# Patient Record
Sex: MALE | Race: BLACK OR AFRICAN AMERICAN | NOT HISPANIC OR LATINO
[De-identification: names, ages, dates, MRNs, and addresses within clinical notes are randomized per-mention and may not be internally consistent; named-entity substitution may affect disease eponyms.]

---

## 2017-02-07 ENCOUNTER — APPOINTMENT (OUTPATIENT)
Dept: ELECTROPHYSIOLOGY | Facility: CLINIC | Age: 82
End: 2017-02-07

## 2017-05-16 ENCOUNTER — APPOINTMENT (OUTPATIENT)
Dept: ELECTROPHYSIOLOGY | Facility: CLINIC | Age: 82
End: 2017-05-16

## 2017-06-27 ENCOUNTER — APPOINTMENT (OUTPATIENT)
Dept: ELECTROPHYSIOLOGY | Facility: CLINIC | Age: 82
End: 2017-06-27

## 2017-07-18 ENCOUNTER — APPOINTMENT (OUTPATIENT)
Dept: ELECTROPHYSIOLOGY | Facility: CLINIC | Age: 82
End: 2017-07-18

## 2017-07-18 VITALS
DIASTOLIC BLOOD PRESSURE: 99 MMHG | BODY MASS INDEX: 32.27 KG/M2 | HEART RATE: 55 BPM | OXYGEN SATURATION: 99 % | WEIGHT: 208 LBS | SYSTOLIC BLOOD PRESSURE: 225 MMHG | HEIGHT: 67.5 IN

## 2017-07-18 VITALS — DIASTOLIC BLOOD PRESSURE: 76 MMHG | SYSTOLIC BLOOD PRESSURE: 165 MMHG

## 2017-07-18 VITALS — SYSTOLIC BLOOD PRESSURE: 174 MMHG | DIASTOLIC BLOOD PRESSURE: 93 MMHG | HEART RATE: 60 BPM

## 2017-07-18 DIAGNOSIS — Z95.0 PRESENCE OF CARDIAC PACEMAKER: ICD-10-CM

## 2017-07-26 ENCOUNTER — INPATIENT (INPATIENT)
Facility: HOSPITAL | Age: 82
LOS: 2 days | Discharge: HOME | End: 2017-07-29
Attending: HOSPITALIST | Admitting: HOSPITALIST
Payer: MEDICARE

## 2017-07-26 VITALS
RESPIRATION RATE: 16 BRPM | SYSTOLIC BLOOD PRESSURE: 154 MMHG | OXYGEN SATURATION: 100 % | DIASTOLIC BLOOD PRESSURE: 67 MMHG | TEMPERATURE: 98 F | HEART RATE: 56 BPM

## 2017-07-26 DIAGNOSIS — C61 MALIGNANT NEOPLASM OF PROSTATE: ICD-10-CM

## 2017-07-26 DIAGNOSIS — R55 SYNCOPE AND COLLAPSE: ICD-10-CM

## 2017-07-26 DIAGNOSIS — Z29.9 ENCOUNTER FOR PROPHYLACTIC MEASURES, UNSPECIFIED: ICD-10-CM

## 2017-07-26 DIAGNOSIS — I10 ESSENTIAL (PRIMARY) HYPERTENSION: ICD-10-CM

## 2017-07-26 DIAGNOSIS — Z95.0 PRESENCE OF CARDIAC PACEMAKER: ICD-10-CM

## 2017-07-26 LAB
ALBUMIN SERPL ELPH-MCNC: 4.5 G/DL — SIGNIFICANT CHANGE UP (ref 3.3–5)
ALP SERPL-CCNC: 68 U/L — SIGNIFICANT CHANGE UP (ref 40–120)
ALT FLD-CCNC: 13 U/L — SIGNIFICANT CHANGE UP (ref 4–41)
ANISOCYTOSIS BLD QL: SLIGHT — SIGNIFICANT CHANGE UP
APPEARANCE UR: CLEAR — SIGNIFICANT CHANGE UP
APTT BLD: 30.3 SEC — SIGNIFICANT CHANGE UP (ref 27.5–37.4)
AST SERPL-CCNC: 21 U/L — SIGNIFICANT CHANGE UP (ref 4–40)
BASOPHILS # BLD AUTO: 0.01 K/UL — SIGNIFICANT CHANGE UP (ref 0–0.2)
BASOPHILS NFR BLD AUTO: 0.3 % — SIGNIFICANT CHANGE UP (ref 0–2)
BASOPHILS NFR SPEC: 0 % — SIGNIFICANT CHANGE UP (ref 0–2)
BILIRUB SERPL-MCNC: 0.4 MG/DL — SIGNIFICANT CHANGE UP (ref 0.2–1.2)
BILIRUB UR-MCNC: NEGATIVE — SIGNIFICANT CHANGE UP
BLOOD UR QL VISUAL: NEGATIVE — SIGNIFICANT CHANGE UP
BUN SERPL-MCNC: 28 MG/DL — HIGH (ref 7–23)
CALCIUM SERPL-MCNC: 10.4 MG/DL — SIGNIFICANT CHANGE UP (ref 8.4–10.5)
CHLORIDE SERPL-SCNC: 96 MMOL/L — LOW (ref 98–107)
CHLORIDE UR-SCNC: 30 MMOL/L — SIGNIFICANT CHANGE UP
CK MB BLD-MCNC: 1.3 NG/ML — SIGNIFICANT CHANGE UP (ref 1–6.6)
CK MB BLD-MCNC: SIGNIFICANT CHANGE UP (ref 0–2.5)
CK SERPL-CCNC: 107 U/L — SIGNIFICANT CHANGE UP (ref 30–200)
CK SERPL-CCNC: 96 U/L — SIGNIFICANT CHANGE UP (ref 30–200)
CO2 SERPL-SCNC: 30 MMOL/L — SIGNIFICANT CHANGE UP (ref 22–31)
COLOR SPEC: SIGNIFICANT CHANGE UP
CREAT ?TM UR-MCNC: 18.19 MG/DL — SIGNIFICANT CHANGE UP
CREAT SERPL-MCNC: 1.97 MG/DL — HIGH (ref 0.5–1.3)
EOSINOPHIL # BLD AUTO: 0.05 K/UL — SIGNIFICANT CHANGE UP (ref 0–0.5)
EOSINOPHIL NFR BLD AUTO: 1.5 % — SIGNIFICANT CHANGE UP (ref 0–6)
EOSINOPHIL NFR FLD: 1.7 % — SIGNIFICANT CHANGE UP (ref 0–6)
GIANT PLATELETS BLD QL SMEAR: PRESENT — SIGNIFICANT CHANGE UP
GLUCOSE SERPL-MCNC: 103 MG/DL — HIGH (ref 70–99)
GLUCOSE UR-MCNC: 50 — SIGNIFICANT CHANGE UP
HCT VFR BLD CALC: 39.8 % — SIGNIFICANT CHANGE UP (ref 39–50)
HGB BLD-MCNC: 12.7 G/DL — LOW (ref 13–17)
IMM GRANULOCYTES # BLD AUTO: 0 # — SIGNIFICANT CHANGE UP
IMM GRANULOCYTES NFR BLD AUTO: 0 % — SIGNIFICANT CHANGE UP (ref 0–1.5)
INR BLD: 0.98 — SIGNIFICANT CHANGE UP (ref 0.88–1.17)
KETONES UR-MCNC: NEGATIVE — SIGNIFICANT CHANGE UP
LEUKOCYTE ESTERASE UR-ACNC: NEGATIVE — SIGNIFICANT CHANGE UP
LYMPHOCYTES # BLD AUTO: 1.08 K/UL — SIGNIFICANT CHANGE UP (ref 1–3.3)
LYMPHOCYTES # BLD AUTO: 32.6 % — SIGNIFICANT CHANGE UP (ref 13–44)
LYMPHOCYTES NFR SPEC AUTO: 29.8 % — SIGNIFICANT CHANGE UP (ref 13–44)
MACROCYTES BLD QL: SLIGHT — SIGNIFICANT CHANGE UP
MCHC RBC-ENTMCNC: 30.8 PG — SIGNIFICANT CHANGE UP (ref 27–34)
MCHC RBC-ENTMCNC: 31.9 % — LOW (ref 32–36)
MCV RBC AUTO: 96.6 FL — SIGNIFICANT CHANGE UP (ref 80–100)
MONOCYTES # BLD AUTO: 0.33 K/UL — SIGNIFICANT CHANGE UP (ref 0–0.9)
MONOCYTES NFR BLD AUTO: 10 % — SIGNIFICANT CHANGE UP (ref 2–14)
MONOCYTES NFR BLD: 5.3 % — SIGNIFICANT CHANGE UP (ref 2–9)
NEUTROPHIL AB SER-ACNC: 61.4 % — SIGNIFICANT CHANGE UP (ref 43–77)
NEUTROPHILS # BLD AUTO: 1.84 K/UL — SIGNIFICANT CHANGE UP (ref 1.8–7.4)
NEUTROPHILS NFR BLD AUTO: 55.6 % — SIGNIFICANT CHANGE UP (ref 43–77)
NITRITE UR-MCNC: NEGATIVE — SIGNIFICANT CHANGE UP
NRBC # FLD: 0 — SIGNIFICANT CHANGE UP
PH UR: 7 — SIGNIFICANT CHANGE UP (ref 4.6–8)
PLATELET # BLD AUTO: 147 K/UL — LOW (ref 150–400)
PLATELET COUNT - ESTIMATE: SIGNIFICANT CHANGE UP
PMV BLD: 10.5 FL — SIGNIFICANT CHANGE UP (ref 7–13)
POTASSIUM SERPL-MCNC: 3.3 MMOL/L — LOW (ref 3.5–5.3)
POTASSIUM SERPL-SCNC: 3.3 MMOL/L — LOW (ref 3.5–5.3)
POTASSIUM UR-SCNC: 8.6 MEQ/L — SIGNIFICANT CHANGE UP
PROT SERPL-MCNC: 8 G/DL — SIGNIFICANT CHANGE UP (ref 6–8.3)
PROT UR-MCNC: 10 — SIGNIFICANT CHANGE UP
PROTHROM AB SERPL-ACNC: 11 SEC — SIGNIFICANT CHANGE UP (ref 9.8–13.1)
RBC # BLD: 4.12 M/UL — LOW (ref 4.2–5.8)
RBC # FLD: 13.3 % — SIGNIFICANT CHANGE UP (ref 10.3–14.5)
RBC CASTS # UR COMP ASSIST: SIGNIFICANT CHANGE UP (ref 0–?)
REVIEW TO FOLLOW: YES — SIGNIFICANT CHANGE UP
SODIUM SERPL-SCNC: 138 MMOL/L — SIGNIFICANT CHANGE UP (ref 135–145)
SODIUM UR-SCNC: 39 MEQ/L — SIGNIFICANT CHANGE UP
SP GR SPEC: 1 — SIGNIFICANT CHANGE UP (ref 1–1.03)
SQUAMOUS # UR AUTO: SIGNIFICANT CHANGE UP
TROPONIN T SERPL-MCNC: < 0.06 NG/ML — SIGNIFICANT CHANGE UP (ref 0–0.06)
TROPONIN T SERPL-MCNC: < 0.06 NG/ML — SIGNIFICANT CHANGE UP (ref 0–0.06)
UROBILINOGEN FLD QL: NORMAL E.U. — SIGNIFICANT CHANGE UP (ref 0.1–0.2)
VARIANT LYMPHS # BLD: 1.8 % — SIGNIFICANT CHANGE UP
WBC # BLD: 3.31 K/UL — LOW (ref 3.8–10.5)
WBC # FLD AUTO: 3.31 K/UL — LOW (ref 3.8–10.5)

## 2017-07-26 PROCEDURE — 99223 1ST HOSP IP/OBS HIGH 75: CPT

## 2017-07-26 PROCEDURE — 71010: CPT | Mod: 26

## 2017-07-26 RX ORDER — MIDODRINE HYDROCHLORIDE 2.5 MG/1
10 TABLET ORAL THREE TIMES A DAY
Qty: 0 | Refills: 0 | Status: DISCONTINUED | OUTPATIENT
Start: 2017-07-26 | End: 2017-07-26

## 2017-07-26 RX ORDER — PROPRANOLOL HCL 160 MG
10 CAPSULE, EXTENDED RELEASE 24HR ORAL
Qty: 0 | Refills: 0 | Status: DISCONTINUED | OUTPATIENT
Start: 2017-07-26 | End: 2017-07-27

## 2017-07-26 RX ORDER — SODIUM CHLORIDE 9 MG/ML
3 INJECTION INTRAMUSCULAR; INTRAVENOUS; SUBCUTANEOUS EVERY 8 HOURS
Qty: 0 | Refills: 0 | Status: DISCONTINUED | OUTPATIENT
Start: 2017-07-26 | End: 2017-07-29

## 2017-07-26 RX ORDER — SIMVASTATIN 20 MG/1
40 TABLET, FILM COATED ORAL AT BEDTIME
Qty: 0 | Refills: 0 | Status: DISCONTINUED | OUTPATIENT
Start: 2017-07-26 | End: 2017-07-26

## 2017-07-26 RX ORDER — ATORVASTATIN CALCIUM 80 MG/1
10 TABLET, FILM COATED ORAL AT BEDTIME
Qty: 0 | Refills: 0 | Status: DISCONTINUED | OUTPATIENT
Start: 2017-07-26 | End: 2017-07-29

## 2017-07-26 RX ORDER — HEPARIN SODIUM 5000 [USP'U]/ML
5000 INJECTION INTRAVENOUS; SUBCUTANEOUS EVERY 12 HOURS
Qty: 0 | Refills: 0 | Status: DISCONTINUED | OUTPATIENT
Start: 2017-07-26 | End: 2017-07-27

## 2017-07-26 RX ORDER — ENZALUTAMIDE 80 MG/1
160 TABLET ORAL DAILY
Qty: 0 | Refills: 0 | Status: DISCONTINUED | OUTPATIENT
Start: 2017-07-26 | End: 2017-07-27

## 2017-07-26 RX ORDER — MIDODRINE HYDROCHLORIDE 2.5 MG/1
10 TABLET ORAL THREE TIMES A DAY
Qty: 0 | Refills: 0 | Status: DISCONTINUED | OUTPATIENT
Start: 2017-07-26 | End: 2017-07-29

## 2017-07-26 RX ORDER — SODIUM CHLORIDE 9 MG/ML
500 INJECTION INTRAMUSCULAR; INTRAVENOUS; SUBCUTANEOUS ONCE
Qty: 0 | Refills: 0 | Status: COMPLETED | OUTPATIENT
Start: 2017-07-26 | End: 2017-07-26

## 2017-07-26 RX ORDER — POTASSIUM CHLORIDE 20 MEQ
40 PACKET (EA) ORAL ONCE
Qty: 0 | Refills: 0 | Status: COMPLETED | OUTPATIENT
Start: 2017-07-26 | End: 2017-07-26

## 2017-07-26 RX ORDER — FLUDROCORTISONE ACETATE 0.1 MG/1
0.1 TABLET ORAL DAILY
Qty: 0 | Refills: 0 | Status: DISCONTINUED | OUTPATIENT
Start: 2017-07-26 | End: 2017-07-29

## 2017-07-26 RX ORDER — ASPIRIN/CALCIUM CARB/MAGNESIUM 324 MG
81 TABLET ORAL DAILY
Qty: 0 | Refills: 0 | Status: DISCONTINUED | OUTPATIENT
Start: 2017-07-26 | End: 2017-07-29

## 2017-07-26 RX ADMIN — SODIUM CHLORIDE 3 MILLILITER(S): 9 INJECTION INTRAMUSCULAR; INTRAVENOUS; SUBCUTANEOUS at 22:00

## 2017-07-26 RX ADMIN — Medication 40 MILLIEQUIVALENT(S): at 18:32

## 2017-07-26 RX ADMIN — ATORVASTATIN CALCIUM 10 MILLIGRAM(S): 80 TABLET, FILM COATED ORAL at 22:56

## 2017-07-26 RX ADMIN — MIDODRINE HYDROCHLORIDE 10 MILLIGRAM(S): 2.5 TABLET ORAL at 22:56

## 2017-07-26 RX ADMIN — SODIUM CHLORIDE 500 MILLILITER(S): 9 INJECTION INTRAMUSCULAR; INTRAVENOUS; SUBCUTANEOUS at 16:26

## 2017-07-26 NOTE — ED ADULT NURSE NOTE - OBJECTIVE STATEMENT
Patient received to room 3, Aox4 and ambulatory. C/o witnessed syncopal episode this AM while sitting in chair, denies falling or hitting head. Does not remember the event. Patient denies c/o at this time. VSS. IN no acute distress. Breathing even and nonlabored. Will continue to monitor.

## 2017-07-26 NOTE — H&P ADULT - ASSESSMENT
84M admitted for syncopal episode. Seen by EP, orthostatic hypotension seems the most likely etiology of his syncopal event today.  Patient is not pacer dependent with underlying rhythm SB in 50's bpm. 84M admitted for syncopal episode. Seen by EP, orthostatic hypotension likely autonomic dysfunction seems the most likely etiology of his syncopal event today.  Patient is not pacer dependent with underlying rhythm SB in 50's bpm.

## 2017-07-26 NOTE — ED PROVIDER NOTE - MEDICAL DECISION MAKING DETAILS
84M p/w syncope. Concern for ACS, arrhythmia, PPM malfunction. Also possibly orthostatic hypotension. Plan: ekg, cxr, labs, troponin, admit tele, EP c/s for PPM interrogation.

## 2017-07-26 NOTE — H&P ADULT - HISTORY OF PRESENT ILLNESS
84M that ambulates with a cane, HTN, Hyperlipidemia, Sinus Node dysfunction s/p Guidant PPM in 2005, vasovagal syncope (total 5 X), orthostatic hypotension-maintained on Florinef/midodrine, prostate Cancer s/p surgery presented to ED s/p atraumatic syncopal episode today, 7/26.  Patient described "I got up from a chair walked about 10 feet and sat down on another chair, next thing my family was shaking me".  He denies prodrome/palpitations.  He reports he has tremors for one month.  His pacemaker was interrogated today.  No events recorded or correlated to syncopal event.  He was evaluated in device clinic last week on 7/18/17 and was found to Elective Replacement Indicator (LINDA) status of his pacemaker.  He was to be scheduled for generator change. He admits exertional dyspnea and occasional upper abdominal discomfort but denies palpitations, chest pain, bowel / bladder incontinence SOB, blurry vision ,dizziness, tinnitus nausea, vomit, diaphoresis chills,. 83 yo man who ambulates with a cane [facility] HTN, Hyperlipidemia, Sinus Node dysfunction s/p Guidant PPM in 2005,  hx of syncope (total 5 X), orthostatic hypotension-maintained on Florinef/midodrine, prostate Cancer s/p surgery presented to ED after a syncopal episode today, 7/26.  Patient reports "I got up from a chair walked about 10 feet and sat down on another chair and took my pills, next thing my family was shaking me".  He denies prodrome, chest pain, palpitations, shortness of breath, tinnitus, vertigo, lightheadedness or dizziness.  His wife at bedside reports her daughter told her that passed out for about 5 minutes. He woke up in the chair without any confusion and was back to baseline. No head strike. Pt did not have any convulsions or incontinence during the episode.  Pt denied fevers, chills, cough, any abdominal pain, nausea, vomiting change in bladder or bowel habits. No sick contacts. No new medication changes. As per wife, pt has good appetite and has been eating and drinking as per usual.  Reports this is similar to prior syncopal episodes. As per wife at bedside, whenever he stands too quickly and walks his blood pressure drops and he passes out. He has tried to stand slowly to prevent this but sometimes forgets. He endorses tremors for one month, exertional dyspnea at times and occasional upper abdominal discomfort    In the ED, pt was afebrile, hypertensive, other vitals WNL. His pacemaker was interrogated today.  No events recorded or correlated to syncopal event.  He was evaluated in device clinic last week on 7/18/17 and was found to Elective Replacement Indicator (LINDA) status of his pacemaker.  He was to be scheduled for generator change.

## 2017-07-26 NOTE — H&P ADULT - NSHPLABSRESULTS_GEN_ALL_CORE
CXR preliminay= Slight right hemidiaphragm elevation. Clear lungs. No pleural effusions or pneumothorax. Stable right chest wall dual-lead pacemaker and cardiac and mediastinal  silhouettes including aortic arch calcifications. Tortuous descending thoracic aorta contour.  EKG A Paced @ 55b/ min, LVH  K+ 3.3  BUN/ Creatinine= 28/ 1.97  Glucose = 103  CARDIAC MARKERS ( 26 Jul 2017 13:50 )  x     / < 0.06 ng/mL / 107 u/L / 1.30 ng/mL / x

## 2017-07-26 NOTE — H&P ADULT - PROBLEM SELECTOR PLAN 1
CM  F/U CE, EKG TTE  Fall and Aspiration precautions  NPO after MN for likely PPM generator change  Give O2, ASA, BB, Statin   F/U Pt consult CM  F/U CE, EKG, TTE  Fall and Aspiration precautions  NPO after MN for likely PPM generator change  C/w  ASA, BB, Statin   F/U PT consult F/U troponins, serial EKG,   Check TTE  Telemetry monitoring  Carotid dopplers  Check TSH  Hgba1c  Fall and Aspiration precautions  NPO after MN for likely PPM generator change  C/w  ASA, BB, Statin   F/U PT consult

## 2017-07-26 NOTE — CONSULT NOTE ADULT - SUBJECTIVE AND OBJECTIVE BOX
HPI:   84 year old male with PMH of HTN, HLD, SND s/p PPM in 2005, vasovagal syncope (total 5 X), orthostatic hypotension-maintained on Florinef/midodrine, prostate Cancer s/p surgery presented to ED s/p syncopal episode today.  Patient described "I got up from a chair walked about 10 feet and sat down on another chair, next thing my family was shaking me".  He denies prodrome/palpitations.  He reports he has tremors for one month.  His pacemaker was interrogated today.  No events recorded or correlated to syncopal event.  He was evaluated in device clinic last week on 7/18/17 and was found to Elective Replacement Indicator (LINDA) status of his pacemaker.  He was to be scheduled for generator change. He admits exertional dyspnea and occasional upper abdominal discomfort but denies palpitations or chest pain.       PAST MEDICAL & SURGICAL HISTORY:  Pacemaker: Guidant. placed 1995. Dr Jovon SUTTON  HTN (hypertension)  Prostate ca  Pacemaker      MEDICATIONS  (STANDING): ASA 81 mg daily; Florinef 0.1 mg daily; Omerprazole 20 mg daily; Midodrine 5 mg TID  sodium chloride 0.9% Bolus 500 milliLiter(s) IV Bolus once    MEDICATIONS  (PRN):    Allergies    No Known Allergies    FAMILY HISTORY: non-contributory    SOCIAL HISTORY:  ex-smoker;  no  ALCOHOL or Drug     REVIEW OF SYSTEMS:    CONSTITUTIONAL: No fever, weight loss, chills, shakes, or fatigue  EYES: No eye pain, visual disturbances, or discharge  ENMT:  No difficulty hearing, tinnitus, vertigo; No sinus or throat pain  NECK: No pain or stiffness  RESPIRATORY: No cough, wheezing, hemoptysis, or shortness of breath  CARDIOVASCULAR: + exertional dyspnea, no palpitations, dizziness, paroxysmal nocturnal dyspnea, orthopnea, or arm or leg swelling  GASTROINTESTINAL: + abdominal  pain, No nausea, vomiting, hematemesis, diarrhea, constipation, melena or bright red blood.  GENITOURINARY: No dysuria, nocturia, hematuria, or urinary incontinence  NEUROLOGICAL: + tremors; No headaches, memory loss, slurred speech, limb weakness, loss of strength, numbness,    SKIN: No itching, burning, rashes, or lesions   LYMPH NODES: No enlarged glands  ENDOCRINE: No heat or cold intolerance, or hair loss  MUSCULOSKELETAL: +back pain, No joint pain or swelling, muscle, or extremity pain  PSYCHIATRIC: No depression, anxiety, or difficulty sleeping  HEME/LYMPH: No easy bruising or bleeding gums  ALLERY AND IMMUNOLOGIC: No hives or rash.      Vital Signs Last 24 Hrs  T(C): 36.6 (26 Jul 2017 13:44), Max: 36.7 (26 Jul 2017 12:31)  T(F): 97.8 (26 Jul 2017 13:44), Max: 98 (26 Jul 2017 12:31)  HR: 56 (26 Jul 2017 12:31) (56 - 56)  BP: 154/67 (26 Jul 2017 12:31) (154/67 - 154/67)  BP(mean): --  RR: 16 (26 Jul 2017 12:31) (16 - 16)  SpO2: 100% (26 Jul 2017 12:31) (100% - 100%)    PHYSICAL EXAM:    GENERAL: In no apparent distress, well nourished, and hydrated.  HEAD:  Atraumatic, Normocephalic  NECK: Supple . No JVD or carotid bruit or thyroidmegaly.  Carotid pulse is 2+ bilaterally.  PULMONARY: Clear to auscultation and perfusion.  No rales, wheezing, or rhonchi bilaterally.  HEART: Regular rate and rhythm; No murmurs, rubs, or gallops.  ABDOMEN: Soft, Nontender, Nondistended; Bowel sounds present  EXTREMITIES: No clubbing, cyanosis, or edema  NEUROLOGICAL: Alert oriented x3; noted hand tremor; no neurological deficit  Skin: dry intact, no rashes or lesion          INTERPRETATION OF TELEMETRY: SB in 50's bpm.      ECG: SB. no ST-T abnormality    LABS:                        12.7   3.31  )-----------( 147      ( 26 Jul 2017 13:50 )             39.8     07-26    138  |  96<L>  |  28<H>  ----------------------------<  103<H>  3.3<L>   |  30  |  1.97<H>    Ca    10.4      26 Jul 2017 13:50    TPro  8.0  /  Alb  4.5  /  TBili  0.4  /  DBili  x   /  AST  21  /  ALT  13  /  AlkPhos  68  07-26    CARDIAC MARKERS ( 26 Jul 2017 13:50 )  x     / < 0.06 ng/mL / 107 u/L / 1.30 ng/mL / x          PT/INR - ( 26 Jul 2017 13:50 )   PT: 11.0 SEC;   INR: 0.98          PTT - ( 26 Jul 2017 13:50 )  PTT:30.3 SEC    BNP  RADIOLOGY & ADDITIONAL STUDIES:  PREVIOUS DIAGNOSTIC TESTING:      ECHO FINDINGS:    STRESS FINDINGS:    CATHETERIZATION FINDINGS:

## 2017-07-26 NOTE — ED PROVIDER NOTE - ATTENDING CONTRIBUTION TO CARE
Attending note:   After face to face evaluation of this patient, I concur with above noted hx, pe, and care plan for this patient. +syncope, recurrent in this patient with PPM.   Patient sitting and passed out without effort to stand.   VSS,  evaluation inprogress

## 2017-07-26 NOTE — H&P ADULT - ATTENDING COMMENTS
Pt seen and examined 7/26/17. Spoke w/ pt and wife at bedside. History edited as above. Labs and imaging reviewed. Based on hx likely orthostatic hypotension from ?autonomic dysfunction. Unlikely cardiac, unlikely seizure, no sign of infection.  Unclear why pt appears to have autonomic dysfunction. No hx of DM. ? early Parkinson's given tremors or other neurologic disease. EP interrogation noted, no cardiac events. Plan for generator change. Check TTE. Propanolol possibly exacerbating but pt w/ hx of HTN. C/w florinef, midodrine, behavior modification such as standing slowly, avoiding standing directly after meals. Can consider trial of reglan/ caffeine. Consider neuro consult for w/u of autonomic dysfunction. Pt seen and examined 7/26/17. Spoke w/ pt and wife at bedside. History edited as above. Labs and imaging reviewed. Based on hx likely orthostatic hypotension from ?autonomic dysfunction. Unlikely cardiac, unlikely seizure, no sign of infection.  Unclear why pt appears to have autonomic dysfunction. No hx of DM. ? early Parkinson's given tremors or other neurologic disease. EP interrogation noted, no cardiac events. Plan for generator change. Check TTE. Carotid dopplers ordered. Propanolol possibly exacerbating but pt w/ hx of HTN. C/w florinef, midodrine, behavior modification such as standing slowly, avoiding standing directly after meals. Can consider trial of reglan/ caffeine. Consider neuro consult for w/u of autonomic dysfunction.

## 2017-07-26 NOTE — ED PROVIDER NOTE - OBJECTIVE STATEMENT
84M h/o HTN, PPM, prostate ca p/w syncopal episode this morning. Pt felt dizzy when standing up from a chair, had LOC. No fall or injury. Not on blood thinners. No prodrome of chest pain, SOB, or palpitations. Pt unsure which medications he takes. No HA, fevers, cough, N/V, diarrhea, or melena. No hx of DVT/PE, calf pain/swelling, hemoptysis, or recent trauma/surgery/immobilization.   EP: Dr Sigala

## 2017-07-26 NOTE — H&P ADULT - PROBLEM SELECTOR PLAN 5
Heparin 5000 U sub cut BID baseline Cr around 1.4. possibly pre-renal  s/p IVF in ED  Trend Cr. baseline Cr around 1.4/ 1.5 possibly pre-renal  s/p IVF in ED  Pt reports good urine ouput  Trend Cr  send urine lytes

## 2017-07-26 NOTE — ED PROVIDER NOTE - PROGRESS NOTE DETAILS
Radha: Spoke with Dr Sigala, who was in the ED. Dr Sigala will arrange for EP to interrogate pt's PPM when admitted.

## 2017-07-27 DIAGNOSIS — N17.9 ACUTE KIDNEY FAILURE, UNSPECIFIED: ICD-10-CM

## 2017-07-27 LAB
ALBUMIN SERPL ELPH-MCNC: 3.8 G/DL — SIGNIFICANT CHANGE UP (ref 3.3–5)
ALP SERPL-CCNC: 59 U/L — SIGNIFICANT CHANGE UP (ref 40–120)
ALT FLD-CCNC: 14 U/L — SIGNIFICANT CHANGE UP (ref 4–41)
AST SERPL-CCNC: 23 U/L — SIGNIFICANT CHANGE UP (ref 4–40)
BASOPHILS # BLD AUTO: 0.01 K/UL — SIGNIFICANT CHANGE UP (ref 0–0.2)
BASOPHILS NFR BLD AUTO: 0.3 % — SIGNIFICANT CHANGE UP (ref 0–2)
BILIRUB SERPL-MCNC: 0.5 MG/DL — SIGNIFICANT CHANGE UP (ref 0.2–1.2)
BUN SERPL-MCNC: 26 MG/DL — HIGH (ref 7–23)
BUN SERPL-MCNC: 26 MG/DL — HIGH (ref 7–23)
CALCIUM SERPL-MCNC: 10.1 MG/DL — SIGNIFICANT CHANGE UP (ref 8.4–10.5)
CALCIUM SERPL-MCNC: 10.1 MG/DL — SIGNIFICANT CHANGE UP (ref 8.4–10.5)
CHLORIDE SERPL-SCNC: 100 MMOL/L — SIGNIFICANT CHANGE UP (ref 98–107)
CHLORIDE SERPL-SCNC: 100 MMOL/L — SIGNIFICANT CHANGE UP (ref 98–107)
CHOLEST SERPL-MCNC: 216 MG/DL — HIGH (ref 120–199)
CO2 SERPL-SCNC: 25 MMOL/L — SIGNIFICANT CHANGE UP (ref 22–31)
CO2 SERPL-SCNC: 25 MMOL/L — SIGNIFICANT CHANGE UP (ref 22–31)
CREAT SERPL-MCNC: 1.53 MG/DL — HIGH (ref 0.5–1.3)
CREAT SERPL-MCNC: 1.53 MG/DL — HIGH (ref 0.5–1.3)
EOSINOPHIL # BLD AUTO: 0.1 K/UL — SIGNIFICANT CHANGE UP (ref 0–0.5)
EOSINOPHIL NFR BLD AUTO: 3.2 % — SIGNIFICANT CHANGE UP (ref 0–6)
GLUCOSE SERPL-MCNC: 100 MG/DL — HIGH (ref 70–99)
GLUCOSE SERPL-MCNC: 100 MG/DL — HIGH (ref 70–99)
HBA1C BLD-MCNC: 6.1 % — HIGH (ref 4–5.6)
HCT VFR BLD CALC: 35.1 % — LOW (ref 39–50)
HCT VFR BLD CALC: 35.1 % — LOW (ref 39–50)
HDLC SERPL-MCNC: 83 MG/DL — HIGH (ref 35–55)
HGB BLD-MCNC: 11.5 G/DL — LOW (ref 13–17)
HGB BLD-MCNC: 11.5 G/DL — LOW (ref 13–17)
IMM GRANULOCYTES # BLD AUTO: 0.01 # — SIGNIFICANT CHANGE UP
IMM GRANULOCYTES NFR BLD AUTO: 0.3 % — SIGNIFICANT CHANGE UP (ref 0–1.5)
LIPID PNL WITH DIRECT LDL SERPL: 129 MG/DL — SIGNIFICANT CHANGE UP
LYMPHOCYTES # BLD AUTO: 1.42 K/UL — SIGNIFICANT CHANGE UP (ref 1–3.3)
LYMPHOCYTES # BLD AUTO: 45.5 % — HIGH (ref 13–44)
MCHC RBC-ENTMCNC: 30.7 PG — SIGNIFICANT CHANGE UP (ref 27–34)
MCHC RBC-ENTMCNC: 30.7 PG — SIGNIFICANT CHANGE UP (ref 27–34)
MCHC RBC-ENTMCNC: 32.8 % — SIGNIFICANT CHANGE UP (ref 32–36)
MCHC RBC-ENTMCNC: 32.8 % — SIGNIFICANT CHANGE UP (ref 32–36)
MCV RBC AUTO: 93.9 FL — SIGNIFICANT CHANGE UP (ref 80–100)
MCV RBC AUTO: 93.9 FL — SIGNIFICANT CHANGE UP (ref 80–100)
MONOCYTES # BLD AUTO: 0.37 K/UL — SIGNIFICANT CHANGE UP (ref 0–0.9)
MONOCYTES NFR BLD AUTO: 11.9 % — SIGNIFICANT CHANGE UP (ref 2–14)
NEUTROPHILS # BLD AUTO: 1.21 K/UL — LOW (ref 1.8–7.4)
NEUTROPHILS NFR BLD AUTO: 38.8 % — LOW (ref 43–77)
NRBC # FLD: 0 — SIGNIFICANT CHANGE UP
NRBC # FLD: 0 — SIGNIFICANT CHANGE UP
PLATELET # BLD AUTO: 127 K/UL — LOW (ref 150–400)
PLATELET # BLD AUTO: 127 K/UL — LOW (ref 150–400)
PMV BLD: 11.5 FL — SIGNIFICANT CHANGE UP (ref 7–13)
PMV BLD: 11.5 FL — SIGNIFICANT CHANGE UP (ref 7–13)
POTASSIUM SERPL-MCNC: 3.8 MMOL/L — SIGNIFICANT CHANGE UP (ref 3.5–5.3)
POTASSIUM SERPL-MCNC: 3.8 MMOL/L — SIGNIFICANT CHANGE UP (ref 3.5–5.3)
POTASSIUM SERPL-SCNC: 3.8 MMOL/L — SIGNIFICANT CHANGE UP (ref 3.5–5.3)
POTASSIUM SERPL-SCNC: 3.8 MMOL/L — SIGNIFICANT CHANGE UP (ref 3.5–5.3)
PROT SERPL-MCNC: 6.9 G/DL — SIGNIFICANT CHANGE UP (ref 6–8.3)
RBC # BLD: 3.74 M/UL — LOW (ref 4.2–5.8)
RBC # BLD: 3.74 M/UL — LOW (ref 4.2–5.8)
RBC # FLD: 13.4 % — SIGNIFICANT CHANGE UP (ref 10.3–14.5)
RBC # FLD: 13.4 % — SIGNIFICANT CHANGE UP (ref 10.3–14.5)
SODIUM SERPL-SCNC: 141 MMOL/L — SIGNIFICANT CHANGE UP (ref 135–145)
SODIUM SERPL-SCNC: 141 MMOL/L — SIGNIFICANT CHANGE UP (ref 135–145)
TRIGL SERPL-MCNC: 84 MG/DL — SIGNIFICANT CHANGE UP (ref 10–149)
TSH SERPL-MCNC: 1.72 UIU/ML — SIGNIFICANT CHANGE UP (ref 0.27–4.2)
WBC # BLD: 3.12 K/UL — LOW (ref 3.8–10.5)
WBC # BLD: 3.12 K/UL — LOW (ref 3.8–10.5)
WBC # FLD AUTO: 3.12 K/UL — LOW (ref 3.8–10.5)
WBC # FLD AUTO: 3.12 K/UL — LOW (ref 3.8–10.5)

## 2017-07-27 PROCEDURE — 93880 EXTRACRANIAL BILAT STUDY: CPT | Mod: 26

## 2017-07-27 PROCEDURE — 93280 PM DEVICE PROGR EVAL DUAL: CPT | Mod: 26

## 2017-07-27 PROCEDURE — 99233 SBSQ HOSP IP/OBS HIGH 50: CPT

## 2017-07-27 PROCEDURE — 93306 TTE W/DOPPLER COMPLETE: CPT | Mod: 26

## 2017-07-27 RX ORDER — PROPRANOLOL HCL 160 MG
10 CAPSULE, EXTENDED RELEASE 24HR ORAL
Qty: 0 | Refills: 0 | Status: DISCONTINUED | OUTPATIENT
Start: 2017-07-27 | End: 2017-07-29

## 2017-07-27 RX ORDER — ENZALUTAMIDE 80 MG/1
160 TABLET ORAL DAILY
Qty: 0 | Refills: 0 | Status: DISCONTINUED | OUTPATIENT
Start: 2017-07-27 | End: 2017-07-29

## 2017-07-27 RX ADMIN — FLUDROCORTISONE ACETATE 0.1 MILLIGRAM(S): 0.1 TABLET ORAL at 06:34

## 2017-07-27 RX ADMIN — Medication 10 MILLIGRAM(S): at 06:35

## 2017-07-27 RX ADMIN — ATORVASTATIN CALCIUM 10 MILLIGRAM(S): 80 TABLET, FILM COATED ORAL at 22:59

## 2017-07-27 RX ADMIN — SODIUM CHLORIDE 3 MILLILITER(S): 9 INJECTION INTRAMUSCULAR; INTRAVENOUS; SUBCUTANEOUS at 14:38

## 2017-07-27 RX ADMIN — Medication 81 MILLIGRAM(S): at 17:19

## 2017-07-27 RX ADMIN — MIDODRINE HYDROCHLORIDE 10 MILLIGRAM(S): 2.5 TABLET ORAL at 06:35

## 2017-07-27 RX ADMIN — MIDODRINE HYDROCHLORIDE 10 MILLIGRAM(S): 2.5 TABLET ORAL at 22:59

## 2017-07-27 RX ADMIN — SODIUM CHLORIDE 3 MILLILITER(S): 9 INJECTION INTRAMUSCULAR; INTRAVENOUS; SUBCUTANEOUS at 22:59

## 2017-07-27 RX ADMIN — SODIUM CHLORIDE 3 MILLILITER(S): 9 INJECTION INTRAMUSCULAR; INTRAVENOUS; SUBCUTANEOUS at 06:35

## 2017-07-27 RX ADMIN — HEPARIN SODIUM 5000 UNIT(S): 5000 INJECTION INTRAVENOUS; SUBCUTANEOUS at 06:34

## 2017-07-27 NOTE — PROGRESS NOTE ADULT - SUBJECTIVE AND OBJECTIVE BOX
Patient is a 84y old  Male who presents with a chief complaint of syncope. Denies chest pain, SOB, palpitations or dizziness      PAST MEDICAL & SURGICAL HISTORY:  Pacemaker: Karina. placed . Dr Jovon SUTTON  HTN (hypertension)  Prostate ca  Pacemaker      MEDICATIONS  (STANDING):  sodium chloride 0.9% lock flush 3 milliLiter(s) IV Push every 8 hours  fludroCORTISONE 0.1 milliGRAM(s) Oral daily  aspirin enteric coated 81 milliGRAM(s) Oral daily  midodrine 10 milliGRAM(s) Oral three times a day  atorvastatin 10 milliGRAM(s) Oral at bedtime  propranolol 10 milliGRAM(s) Oral two times a day  enzalutamide 160 milliGRAM(s) Oral daily    MEDICATIONS  (PRN):            Vital Signs Last 24 Hrs  T(C): 36.6 (2017 14:37), Max: 36.7 (2017 20:00)  T(F): 97.9 (2017 14:37), Max: 98.1 (2017 06:30)  HR: 55 (2017 17:24) (53 - 67)  BP: 153/74 (2017 17:24) (148/62 - 186/72)  BP(mean): --  RR: 18 (2017 14:37) (16 - 18)  SpO2: 100% (2017 14:37) (100% - 100%)            INTERPRETATION OF TELEMETRY:     LABS:                        11.5   3.12  )-----------( 127      ( 2017 07:00 )             35.1         141  |  100  |  26<H>  ----------------------------<  100<H>  3.8   |  25  |  1.53<H>    Ca    10.1      2017 07:00    TPro  6.9  /  Alb  3.8  /  TBili  0.5  /  DBili  x   /  AST  23  /  ALT  14  /  AlkPhos  59  07    CARDIAC MARKERS ( 2017 22:12 )  x     / < 0.06 ng/mL / 96 u/L / x     / x      CARDIAC MARKERS ( 2017 13:50 )  x     / < 0.06 ng/mL / 107 u/L / 1.30 ng/mL / x          PT/INR - ( 2017 13:50 )   PT: 11.0 SEC;   INR: 0.98          PTT - ( 2017 13:50 )  PTT:30.3 SEC  Urinalysis Basic - ( 2017 16:13 )    Color: COLORL / Appearance: CLEAR / S.005 / pH: 7.0  Gluc: 50 / Ketone: NEGATIVE  / Bili: NEGATIVE / Urobili: NORMAL E.U.   Blood: NEGATIVE / Protein: 10 / Nitrite: NEGATIVE   Leuk Esterase: NEGATIVE / RBC: 0-2 / WBC x   Sq Epi: OCC / Non Sq Epi: x / Bacteria: x            PHYSICAL EXAM:    GENERAL: In no apparent distress, well nourished, and hydrated.  HEAD:  Atraumatic, Normocephalic  EYES: EOMI, PERRLA, conjunctiva and sclera clear  ENMT: No tonsillar erythema, exudates, or enlargement; Moist mucous membranes, Good dentition, No lesions  NECK: Supple and normal thyroid.  No JVD or carotid bruit.  Carotid pulse is 2+ bilaterally.  HEART: Regular rate and rhythm; No murmurs, rubs, or gallops.  PULMONARY: Clear to auscultation and perfusion.  No rales, wheezing, or rhonchi bilaterally.  ABDOMEN: Soft, Nontender, Nondistended; Bowel sounds present  EXTREMITIES:  2+ Peripheral Pulses, No clubbing, cyanosis, or edema  LYMPH: No lymphadenopathy noted  NEUROLOGICAL: Grossly nonfocal Patient is a 84y old  Male who presents with a chief complaint of syncope. Denies chest pain, SOB, palpitations or dizziness      PAST MEDICAL & SURGICAL HISTORY:  Pacemaker: Karina. placed . Dr Jovon SUTTON  HTN (hypertension)  Prostate ca  Pacemaker      MEDICATIONS  (STANDING):  sodium chloride 0.9% lock flush 3 milliLiter(s) IV Push every 8 hours  fludroCORTISONE 0.1 milliGRAM(s) Oral daily  aspirin enteric coated 81 milliGRAM(s) Oral daily  midodrine 10 milliGRAM(s) Oral three times a day  atorvastatin 10 milliGRAM(s) Oral at bedtime  propranolol 10 milliGRAM(s) Oral two times a day  enzalutamide 160 milliGRAM(s) Oral daily    MEDICATIONS  (PRN):            Vital Signs Last 24 Hrs  T(C): 36.6 (2017 14:37), Max: 36.7 (2017 20:00)  T(F): 97.9 (2017 14:37), Max: 98.1 (2017 06:30)  HR: 55 (2017 17:24) (53 - 67)  BP: 153/74 (2017 17:24) (148/62 - 186/72)  BP(mean): --  RR: 18 (2017 14:37) (16 - 18)  SpO2: 100% (2017 14:37) (100% - 100%)            INTERPRETATION OF TELEMETRY:     LABS:                        11.5   3.12  )-----------( 127      ( 2017 07:00 )             35.1         141  |  100  |  26<H>  ----------------------------<  100<H>  3.8   |  25  |  1.53<H>    Ca    10.1      2017 07:00    TPro  6.9  /  Alb  3.8  /  TBili  0.5  /  DBili  x   /  AST  23  /  ALT  14  /  AlkPhos  59  07    CARDIAC MARKERS ( 2017 22:12 )  x     / < 0.06 ng/mL / 96 u/L / x     / x      CARDIAC MARKERS ( 2017 13:50 )  x     / < 0.06 ng/mL / 107 u/L / 1.30 ng/mL / x          PT/INR - ( 2017 13:50 )   PT: 11.0 SEC;   INR: 0.98          PTT - ( 2017 13:50 )  PTT:30.3 SEC  Urinalysis Basic - ( 2017 16:13 )    Color: COLORL / Appearance: CLEAR / S.005 / pH: 7.0  Gluc: 50 / Ketone: NEGATIVE  / Bili: NEGATIVE / Urobili: NORMAL E.U.   Blood: NEGATIVE / Protein: 10 / Nitrite: NEGATIVE   Leuk Esterase: NEGATIVE / RBC: 0-2 / WBC x   Sq Epi: OCC / Non Sq Epi: x / Bacteria: x            PHYSICAL EXAM:    GENERAL: In no apparent distress, well nourished, and hydrated.  HEAD:  Atraumatic, Normocephalic  HEART: Regular rate and rhythm; No murmurs, rubs, or gallops.  PULMONARY: Clear to auscultation and perfusion.  No rales, wheezing, or rhonchi bilaterally.  ABDOMEN: Soft, Nontender, Nondistended; Bowel sounds present  EXTREMITIES:  2+ Peripheral Pulses, No clubbing, cyanosis, or edema  LYMPH: No lymphadenopathy noted  NEUROLOGICAL: Grossly nonfocal Patient is a 84y old  Male who presents with a chief complaint of syncope. Denies chest pain, SOB, palpitations or dizziness      PAST MEDICAL & SURGICAL HISTORY:  Pacemaker: Guidant. placed . Dr Jovon SUTTON  HTN (hypertension)  Prostate ca  Pacemaker      MEDICATIONS  (STANDING):  sodium chloride 0.9% lock flush 3 milliLiter(s) IV Push every 8 hours  fludroCORTISONE 0.1 milliGRAM(s) Oral daily  aspirin enteric coated 81 milliGRAM(s) Oral daily  midodrine 10 milliGRAM(s) Oral three times a day  atorvastatin 10 milliGRAM(s) Oral at bedtime  propranolol 10 milliGRAM(s) Oral two times a day  enzalutamide 160 milliGRAM(s) Oral daily    MEDICATIONS  (PRN):            Vital Signs Last 24 Hrs  T(C): 36.6 (2017 14:37), Max: 36.7 (2017 20:00)  T(F): 97.9 (2017 14:37), Max: 98.1 (2017 06:30)  HR: 55 (2017 17:24) (53 - 67)  BP: 153/74 (2017 17:24) (148/62 - 186/72)  BP(mean): --  RR: 18 (2017 14:37) (16 - 18)  SpO2: 100% (2017 14:37) (100% - 100%)            INTERPRETATION OF TELEMETRY: Atrial pacing with ventricular sensing in 50s    LABS:                        11.5   3.12  )-----------( 127      ( 2017 07:00 )             35.1         141  |  100  |  26<H>  ----------------------------<  100<H>  3.8   |  25  |  1.53<H>    Ca    10.1      2017 07:00    TPro  6.9  /  Alb  3.8  /  TBili  0.5  /  DBili  x   /  AST  23  /  ALT  14  /  AlkPhos  59      CARDIAC MARKERS ( 2017 22:12 )  x     / < 0.06 ng/mL / 96 u/L / x     / x      CARDIAC MARKERS ( 2017 13:50 )  x     / < 0.06 ng/mL / 107 u/L / 1.30 ng/mL / x          PT/INR - ( 2017 13:50 )   PT: 11.0 SEC;   INR: 0.98          PTT - ( 2017 13:50 )  PTT:30.3 SEC  Urinalysis Basic - ( 2017 16:13 )    Color: COLORL / Appearance: CLEAR / S.005 / pH: 7.0  Gluc: 50 / Ketone: NEGATIVE  / Bili: NEGATIVE / Urobili: NORMAL E.U.   Blood: NEGATIVE / Protein: 10 / Nitrite: NEGATIVE   Leuk Esterase: NEGATIVE / RBC: 0-2 / WBC x   Sq Epi: OCC / Non Sq Epi: x / Bacteria: x            PHYSICAL EXAM:    GENERAL: In no apparent distress, well nourished, and hydrated.  HEAD:  Atraumatic, Normocephalic  HEART: Regular rate and rhythm; No murmurs, rubs, or gallops.  PULMONARY: Clear to auscultation and perfusion.  No rales, wheezing, or rhonchi bilaterally.  ABDOMEN: Soft, Nontender, Nondistended; Bowel sounds present  EXTREMITIES:  2+ Peripheral Pulses, No clubbing, cyanosis, or edema  LYMPH: No lymphadenopathy noted  NEUROLOGICAL: Grossly nonfocal

## 2017-07-27 NOTE — PROGRESS NOTE ADULT - PROBLEM SELECTOR PLAN 1
Pt with known orthostatic hypotension, which appears to be persistent problem, trops neg, PPM interrogation wnl, TTE limited. Per d/w Dr. miguel Monroy who knows the pt well, this has been extensively worked up and has been a chronic problem in spite of treatment. He notes while the pt is compliant with meds he does not wear the compression stockings. He reports the propranolol is dosed by his OP cardiologist for treatment of tachyarrhythmias.  NPO after MN for likely PPM generator change  C/w  ASA, BB, Statin   Give compression stocking and d/w patient importance of wearing he expressed understanding  Neuro c/s for whole body tremors and orthostatic hypotension to establish OP fu Pt with known orthostatic hypotension, which appears to be persistent problem, trops neg, PPM interrogation wnl, TTE limited. Per d/w Dr. miguel Monroy who knows the pt well, this has been extensively worked up and has been a chronic problem in spite of treatment. He notes while the pt is compliant with meds he does not wear the compression stockings. He reports the propranolol is dosed by his OP cardiologist for treatment of tachyarrhythmias.  NPO after MN for likely PPM generator change  C/w  ASA, BB, Statin   Give compression stocking and d/w patient importance of wearing he expressed understanding  Per d/w Dr. miguel Monroy orthostatics has been extensively investigated as OP, will not pursue IP neuro c/s given his tremor is c/w essential tremor and pt close and ongoing OP workup

## 2017-07-27 NOTE — PROGRESS NOTE ADULT - SUBJECTIVE AND OBJECTIVE BOX
Patient is a 84y old  Male who presents with a chief complaint of syncope (2017 20:02)      SUBJECTIVE / OVERNIGHT EVENTS: Pt reports feeling at his baseline, and that his symptoms have been chronic. TTE limited, carotid dopplers wnl, pt with persistent orthostatic hypotension.    MEDICATIONS  (STANDING):  sodium chloride 0.9% lock flush 3 milliLiter(s) IV Push every 8 hours  fludroCORTISONE 0.1 milliGRAM(s) Oral daily  aspirin enteric coated 81 milliGRAM(s) Oral daily  propranolol 10 milliGRAM(s) Oral two times a day  enzalutamide 160 milliGRAM(s) Oral daily  midodrine 10 milliGRAM(s) Oral three times a day  atorvastatin 10 milliGRAM(s) Oral at bedtime    MEDICATIONS  (PRN):      Vital Signs Last 24 Hrs  T(C): 36.7 (17 @ 06:30), Max: 36.7 (17 @ 20:00)  T(F): 98.1 (17 @ 06:30), Max: 98.1 (17 @ 06:30)  HR: 53 (17 @ 06:30) (53 - 67)  BP: 153/69 (17 @ 06:30) (148/62 - 186/72)  BP(mean): --  RR: 16 (17 @ 06:30) (16 - 16)  SpO2: 100% (17 @ 06:30) (100% - 100%)  CAPILLARY BLOOD GLUCOSE        I&O's Summary      PHYSICAL EXAM:  GENERAL: NAD, well-nourished  HEENT: mmm  CHEST/LUNG: CTABL  HEART: RRR, no m/r/g  ABDOMEN: soft, nt, nd  EXTREMITIES:  wwp, no c/c/e  PSYCH: AAOx3  NEUROLOGY: pt with mild peripheral and axial tremors, otherwise non-focal exam  SKIN: No rashes or lesions    LABS:                        11.5   3.12  )-----------( 127      ( 2017 07:00 )             35.1         141  |  100  |  26<H>  ----------------------------<  100<H>  3.8   |  25  |  1.53<H>    Ca    10.1      2017 07:00    TPro  6.9  /  Alb  3.8  /  TBili  0.5  /  DBili  x   /  AST  23  /  ALT  14  /  AlkPhos  59  07-27    PT/INR - ( 2017 13:50 )   PT: 11.0 SEC;   INR: 0.98          PTT - ( 2017 13:50 )  PTT:30.3 SEC  CARDIAC MARKERS ( 2017 22:12 )  x     / < 0.06 ng/mL / 96 u/L / x     / x      CARDIAC MARKERS ( 2017 13:50 )  x     / < 0.06 ng/mL / 107 u/L / 1.30 ng/mL / x          Urinalysis Basic - ( 2017 16:13 )    Color: COLORL / Appearance: CLEAR / S.005 / pH: 7.0  Gluc: 50 / Ketone: NEGATIVE  / Bili: NEGATIVE / Urobili: NORMAL E.U.   Blood: NEGATIVE / Protein: 10 / Nitrite: NEGATIVE   Leuk Esterase: NEGATIVE / RBC: 0-2 / WBC x   Sq Epi: OCC / Non Sq Epi: x / Bacteria: x        RADIOLOGY & ADDITIONAL TESTS:    Imaging Personally Reviewed:    Consultant(s) Notes Reviewed:      Care Discussed with Consultants/Other Providers:

## 2017-07-27 NOTE — CHART NOTE - NSCHARTNOTEFT_GEN_A_CORE
Contacted to approve enzalutamide continuation while inpatient. Pt is a poor historian, but as per the history, is maintained on 160mg of enzalutamide as an outpatient for his metastatic prostate cancer. There is no obvious contraindication to continuation of treatment at this time, will recommend continuing while inpatient. Please contact with further questions. Contacted to approve enzalutamide continuation while inpatient. Pt is a poor historian, but as per the history, is maintained on 160mg of enzalutamide as an outpatient for his metastatic prostate cancer. There is no obvious contraindication to continuation of treatment at this time, will recommend continuing while inpatient. Please contact with further questions.    ___________________  Concur with plan outlined by Dr Duran above. PARMJIT Rocha MD PhD service attending / oncology

## 2017-07-27 NOTE — PROGRESS NOTE ADULT - ASSESSMENT
84 year old male with PMH of HTN, HLD, SND s/p PPM in 2005, vasovagal syncope (total 5 X), orthostatic hypotension-maintained on Florinef/midodrine, prostate Cancer s/p surgery presented to ED s/p syncopal episode.  Orthostatic hypotension seems the most likely etiology of his syncopal event. Patient is not pacer dependent with underlying rhythm SB in 50's bpm.     Ecchocardiogram  Plan for PPM generator change on this admission. Patient consented for the procedure after understanding risks/benefits/alternatives

## 2017-07-27 NOTE — PROGRESS NOTE ADULT - ASSESSMENT
84M admitted for syncopal episode. Seen by EP, orthostatic hypotension likely autonomic dysfunction seems the most likely etiology of his syncopal event today.

## 2017-07-28 ENCOUNTER — TRANSCRIPTION ENCOUNTER (OUTPATIENT)
Age: 82
End: 2017-07-28

## 2017-07-28 LAB
BACTERIA UR CULT: SIGNIFICANT CHANGE UP
BASOPHILS # BLD AUTO: 0.01 K/UL — SIGNIFICANT CHANGE UP (ref 0–0.2)
BASOPHILS NFR BLD AUTO: 0.3 % — SIGNIFICANT CHANGE UP (ref 0–2)
BUN SERPL-MCNC: 27 MG/DL — HIGH (ref 7–23)
CALCIUM SERPL-MCNC: 10.2 MG/DL — SIGNIFICANT CHANGE UP (ref 8.4–10.5)
CHLORIDE SERPL-SCNC: 99 MMOL/L — SIGNIFICANT CHANGE UP (ref 98–107)
CO2 SERPL-SCNC: 29 MMOL/L — SIGNIFICANT CHANGE UP (ref 22–31)
CREAT SERPL-MCNC: 1.48 MG/DL — HIGH (ref 0.5–1.3)
EOSINOPHIL # BLD AUTO: 0.11 K/UL — SIGNIFICANT CHANGE UP (ref 0–0.5)
EOSINOPHIL NFR BLD AUTO: 3.5 % — SIGNIFICANT CHANGE UP (ref 0–6)
GLUCOSE SERPL-MCNC: 101 MG/DL — HIGH (ref 70–99)
HCT VFR BLD CALC: 36.3 % — LOW (ref 39–50)
HCT VFR BLD CALC: 36.3 % — LOW (ref 39–50)
HGB BLD-MCNC: 11.9 G/DL — LOW (ref 13–17)
HGB BLD-MCNC: 11.9 G/DL — LOW (ref 13–17)
IMM GRANULOCYTES # BLD AUTO: 0.01 # — SIGNIFICANT CHANGE UP
IMM GRANULOCYTES NFR BLD AUTO: 0.3 % — SIGNIFICANT CHANGE UP (ref 0–1.5)
LYMPHOCYTES # BLD AUTO: 1.28 K/UL — SIGNIFICANT CHANGE UP (ref 1–3.3)
LYMPHOCYTES # BLD AUTO: 40.8 % — SIGNIFICANT CHANGE UP (ref 13–44)
MAGNESIUM SERPL-MCNC: 2.4 MG/DL — SIGNIFICANT CHANGE UP (ref 1.6–2.6)
MCHC RBC-ENTMCNC: 31.6 PG — SIGNIFICANT CHANGE UP (ref 27–34)
MCHC RBC-ENTMCNC: 31.6 PG — SIGNIFICANT CHANGE UP (ref 27–34)
MCHC RBC-ENTMCNC: 32.8 % — SIGNIFICANT CHANGE UP (ref 32–36)
MCHC RBC-ENTMCNC: 32.8 % — SIGNIFICANT CHANGE UP (ref 32–36)
MCV RBC AUTO: 96.3 FL — SIGNIFICANT CHANGE UP (ref 80–100)
MCV RBC AUTO: 96.3 FL — SIGNIFICANT CHANGE UP (ref 80–100)
MONOCYTES # BLD AUTO: 0.37 K/UL — SIGNIFICANT CHANGE UP (ref 0–0.9)
MONOCYTES NFR BLD AUTO: 11.8 % — SIGNIFICANT CHANGE UP (ref 2–14)
NEUTROPHILS # BLD AUTO: 1.36 K/UL — LOW (ref 1.8–7.4)
NEUTROPHILS NFR BLD AUTO: 43.3 % — SIGNIFICANT CHANGE UP (ref 43–77)
NRBC # FLD: 0 — SIGNIFICANT CHANGE UP
NRBC # FLD: 0 — SIGNIFICANT CHANGE UP
PLATELET # BLD AUTO: 134 K/UL — LOW (ref 150–400)
PLATELET # BLD AUTO: 134 K/UL — LOW (ref 150–400)
PMV BLD: 11.3 FL — SIGNIFICANT CHANGE UP (ref 7–13)
PMV BLD: 11.3 FL — SIGNIFICANT CHANGE UP (ref 7–13)
POTASSIUM SERPL-MCNC: 3.6 MMOL/L — SIGNIFICANT CHANGE UP (ref 3.5–5.3)
POTASSIUM SERPL-SCNC: 3.6 MMOL/L — SIGNIFICANT CHANGE UP (ref 3.5–5.3)
RBC # BLD: 3.77 M/UL — LOW (ref 4.2–5.8)
RBC # BLD: 3.77 M/UL — LOW (ref 4.2–5.8)
RBC # FLD: 13.3 % — SIGNIFICANT CHANGE UP (ref 10.3–14.5)
RBC # FLD: 13.3 % — SIGNIFICANT CHANGE UP (ref 10.3–14.5)
SODIUM SERPL-SCNC: 140 MMOL/L — SIGNIFICANT CHANGE UP (ref 135–145)
SPECIMEN SOURCE: SIGNIFICANT CHANGE UP
WBC # BLD: 3.16 K/UL — LOW (ref 3.8–10.5)
WBC # BLD: 3.16 K/UL — LOW (ref 3.8–10.5)
WBC # FLD AUTO: 3.16 K/UL — LOW (ref 3.8–10.5)
WBC # FLD AUTO: 3.16 K/UL — LOW (ref 3.8–10.5)

## 2017-07-28 PROCEDURE — 93010 ELECTROCARDIOGRAM REPORT: CPT

## 2017-07-28 RX ORDER — CEFAZOLIN SODIUM 1 G
1000 VIAL (EA) INJECTION ONCE
Qty: 0 | Refills: 0 | Status: COMPLETED | OUTPATIENT
Start: 2017-07-29 | End: 2017-07-29

## 2017-07-28 RX ADMIN — ENZALUTAMIDE 160 MILLIGRAM(S): 80 TABLET ORAL at 22:37

## 2017-07-28 RX ADMIN — MIDODRINE HYDROCHLORIDE 10 MILLIGRAM(S): 2.5 TABLET ORAL at 22:37

## 2017-07-28 RX ADMIN — SODIUM CHLORIDE 3 MILLILITER(S): 9 INJECTION INTRAMUSCULAR; INTRAVENOUS; SUBCUTANEOUS at 13:17

## 2017-07-28 RX ADMIN — MIDODRINE HYDROCHLORIDE 10 MILLIGRAM(S): 2.5 TABLET ORAL at 05:27

## 2017-07-28 RX ADMIN — FLUDROCORTISONE ACETATE 0.1 MILLIGRAM(S): 0.1 TABLET ORAL at 05:27

## 2017-07-28 RX ADMIN — Medication 10 MILLIGRAM(S): at 20:47

## 2017-07-28 RX ADMIN — Medication 10 MILLIGRAM(S): at 05:27

## 2017-07-28 RX ADMIN — SODIUM CHLORIDE 3 MILLILITER(S): 9 INJECTION INTRAMUSCULAR; INTRAVENOUS; SUBCUTANEOUS at 22:41

## 2017-07-28 RX ADMIN — ATORVASTATIN CALCIUM 10 MILLIGRAM(S): 80 TABLET, FILM COATED ORAL at 22:37

## 2017-07-28 RX ADMIN — SODIUM CHLORIDE 3 MILLILITER(S): 9 INJECTION INTRAMUSCULAR; INTRAVENOUS; SUBCUTANEOUS at 05:22

## 2017-07-28 RX ADMIN — Medication 81 MILLIGRAM(S): at 20:47

## 2017-07-28 NOTE — CHART NOTE - NSCHARTNOTEFT_GEN_A_CORE
Pt noted with elevated BP prior to going down for PPM generator change 188/96mmhg. He admits to feeling nervous about the procedure and denies any other complaints. He does not have CP, SOB, palpitations, HA, dizziness, change in vision, numbness/tingling, paresthesias, weakness, lightheadedness. I reviewed his vital signs with EP team, recommend to send Pt to cath lab and will reassess there. I discussed elevated BP with Dr. Samayoa as well, no intervention at this time, BP elevated in setting of significant orthostatic hypotension, Pt currently on Midodrine and Florinef. Will reassess BP and continue to monitor closely. Repeat orthostatics in AM.

## 2017-07-28 NOTE — CHART NOTE - NSCHARTNOTEFT_GEN_A_CORE
Patient is s/p PPM generator change. Device site with no bleeding or hematoma. Device teaching given to patient and he demonstrates understanding of the instructions. F/U appointment with device clinic on 8/10/17 at 3:15pm

## 2017-07-28 NOTE — CHART NOTE - NSCHARTNOTEFT_GEN_A_CORE
Patient s/p PPM generator change .   Patient denies any complaints,   Right subclavicular incision site stable. Dressing clean, dry and intact. No hematoma or active bleeding noted.     Patient hemodynamically stable will continue to monitor     T(C): 36.8 (07-28-17 @ 22:35), Max: 36.8 (07-28-17 @ 19:50)  HR: 60 (07-28-17 @ 22:35) (51 - 62)  BP: 99/54 (07-28-17 @ 22:35) (99/54 - 188/96)  RR: 18 (07-28-17 @ 22:35) (17 - 18)  SpO2: 100% (07-28-17 @ 22:35) (100% - 100%)

## 2017-07-28 NOTE — CHART NOTE - NSCHARTNOTEFT_GEN_A_CORE
Patient is seen and examined. Denies chest pain, SOB, palpitations or dizziness. NPO p MN for PPM generator change. Consent in the chart and patient demonstrates understanding of the risks/benefits and alternatives of the procedure. Patient's WBC 3.1. Discussed with Dr. Sigala yesterday. Okay to proceed with procedure as per Dr. Sigala if ANC >1000. Calculated ANC ~1211. Patient with no fever, chills or other signs of infection. Assessment: please see EP preprocedure assessment record.

## 2017-07-29 VITALS
DIASTOLIC BLOOD PRESSURE: 83 MMHG | SYSTOLIC BLOOD PRESSURE: 159 MMHG | TEMPERATURE: 98 F | RESPIRATION RATE: 17 BRPM | OXYGEN SATURATION: 99 % | HEART RATE: 61 BPM

## 2017-07-29 LAB
BUN SERPL-MCNC: 31 MG/DL — HIGH (ref 7–23)
CALCIUM SERPL-MCNC: 9.9 MG/DL — SIGNIFICANT CHANGE UP (ref 8.4–10.5)
CHLORIDE SERPL-SCNC: 100 MMOL/L — SIGNIFICANT CHANGE UP (ref 98–107)
CO2 SERPL-SCNC: 27 MMOL/L — SIGNIFICANT CHANGE UP (ref 22–31)
CREAT SERPL-MCNC: 1.84 MG/DL — HIGH (ref 0.5–1.3)
GLUCOSE SERPL-MCNC: 129 MG/DL — HIGH (ref 70–99)
HCT VFR BLD CALC: 37.2 % — LOW (ref 39–50)
HGB BLD-MCNC: 11.7 G/DL — LOW (ref 13–17)
MAGNESIUM SERPL-MCNC: 2.4 MG/DL — SIGNIFICANT CHANGE UP (ref 1.6–2.6)
MCHC RBC-ENTMCNC: 30.5 PG — SIGNIFICANT CHANGE UP (ref 27–34)
MCHC RBC-ENTMCNC: 31.5 % — LOW (ref 32–36)
MCV RBC AUTO: 96.9 FL — SIGNIFICANT CHANGE UP (ref 80–100)
NRBC # FLD: 0 — SIGNIFICANT CHANGE UP
PHOSPHATE SERPL-MCNC: 4.1 MG/DL — SIGNIFICANT CHANGE UP (ref 2.5–4.5)
PLATELET # BLD AUTO: 140 K/UL — LOW (ref 150–400)
PMV BLD: 10.8 FL — SIGNIFICANT CHANGE UP (ref 7–13)
POTASSIUM SERPL-MCNC: 3.9 MMOL/L — SIGNIFICANT CHANGE UP (ref 3.5–5.3)
POTASSIUM SERPL-SCNC: 3.9 MMOL/L — SIGNIFICANT CHANGE UP (ref 3.5–5.3)
RBC # BLD: 3.84 M/UL — LOW (ref 4.2–5.8)
RBC # FLD: 13.4 % — SIGNIFICANT CHANGE UP (ref 10.3–14.5)
SODIUM SERPL-SCNC: 142 MMOL/L — SIGNIFICANT CHANGE UP (ref 135–145)
WBC # BLD: 4.51 K/UL — SIGNIFICANT CHANGE UP (ref 3.8–10.5)
WBC # FLD AUTO: 4.51 K/UL — SIGNIFICANT CHANGE UP (ref 3.8–10.5)

## 2017-07-29 PROCEDURE — 99233 SBSQ HOSP IP/OBS HIGH 50: CPT

## 2017-07-29 PROCEDURE — 99239 HOSP IP/OBS DSCHRG MGMT >30: CPT

## 2017-07-29 RX ORDER — FLUDROCORTISONE ACETATE 0.1 MG/1
1 TABLET ORAL
Qty: 30 | Refills: 0
Start: 2017-07-29 | End: 2017-08-28

## 2017-07-29 RX ORDER — PROPRANOLOL HCL 160 MG
1 CAPSULE, EXTENDED RELEASE 24HR ORAL
Qty: 0 | Refills: 0 | COMMUNITY

## 2017-07-29 RX ORDER — PROPRANOLOL HCL 160 MG
1 CAPSULE, EXTENDED RELEASE 24HR ORAL
Qty: 0 | Refills: 0 | COMMUNITY
Start: 2017-07-29

## 2017-07-29 RX ORDER — MIDODRINE HYDROCHLORIDE 2.5 MG/1
1 TABLET ORAL
Qty: 0 | Refills: 0 | COMMUNITY
Start: 2017-07-29

## 2017-07-29 RX ORDER — ENZALUTAMIDE 80 MG/1
4 TABLET ORAL
Qty: 0 | Refills: 0 | COMMUNITY

## 2017-07-29 RX ORDER — ASPIRIN/CALCIUM CARB/MAGNESIUM 324 MG
1 TABLET ORAL
Qty: 0 | Refills: 0 | COMMUNITY
Start: 2017-07-29

## 2017-07-29 RX ORDER — MIDODRINE HYDROCHLORIDE 2.5 MG/1
1 TABLET ORAL
Qty: 0 | Refills: 0 | COMMUNITY

## 2017-07-29 RX ORDER — DOCUSATE SODIUM 100 MG
100 CAPSULE ORAL
Qty: 0 | Refills: 0 | Status: DISCONTINUED | OUTPATIENT
Start: 2017-07-29 | End: 2017-07-29

## 2017-07-29 RX ORDER — ASPIRIN/CALCIUM CARB/MAGNESIUM 324 MG
1 TABLET ORAL
Qty: 0 | Refills: 0 | COMMUNITY

## 2017-07-29 RX ORDER — SENNA PLUS 8.6 MG/1
2 TABLET ORAL AT BEDTIME
Qty: 0 | Refills: 0 | Status: DISCONTINUED | OUTPATIENT
Start: 2017-07-29 | End: 2017-07-29

## 2017-07-29 RX ORDER — ENZALUTAMIDE 80 MG/1
4 TABLET ORAL
Qty: 0 | Refills: 0 | DISCHARGE
Start: 2017-07-29

## 2017-07-29 RX ADMIN — Medication 10 MILLIGRAM(S): at 17:02

## 2017-07-29 RX ADMIN — SODIUM CHLORIDE 3 MILLILITER(S): 9 INJECTION INTRAMUSCULAR; INTRAVENOUS; SUBCUTANEOUS at 06:56

## 2017-07-29 RX ADMIN — SODIUM CHLORIDE 3 MILLILITER(S): 9 INJECTION INTRAMUSCULAR; INTRAVENOUS; SUBCUTANEOUS at 14:35

## 2017-07-29 RX ADMIN — FLUDROCORTISONE ACETATE 0.1 MILLIGRAM(S): 0.1 TABLET ORAL at 07:27

## 2017-07-29 RX ADMIN — Medication 100 MILLIGRAM(S): at 01:45

## 2017-07-29 RX ADMIN — Medication 10 MILLIGRAM(S): at 07:27

## 2017-07-29 RX ADMIN — Medication 81 MILLIGRAM(S): at 11:57

## 2017-07-29 RX ADMIN — Medication 100 MILLIGRAM(S): at 17:02

## 2017-07-29 RX ADMIN — ENZALUTAMIDE 160 MILLIGRAM(S): 80 TABLET ORAL at 11:58

## 2017-07-29 NOTE — DISCHARGE NOTE ADULT - PLAN OF CARE
Symptom resolution, medication compliance, prevent recurrent episodes, monitor and control risk factors Follow up with your Cardiologist within 1 week, call for appointment Monitor site for bleeding, swelling, discharge, discoloration, call your physician Pacemaker clinic in 2 weeks an appointment was given to you.   Routine PPM evaluation, prevention of arrhythmia, medication compliance Symptom resolution, medication compliance, prevent disease progression Oncologist follow up as outpatient Follow up with your Cardiologist within 1 week, call for appointment Dr Monroy Pacemaker clinic in 2 weeks an appointment was given to you. 8/10/17 at 3 :15 PM   Routine PPM evaluation, prevention of arrhythmia, medication compliance

## 2017-07-29 NOTE — DISCHARGE NOTE ADULT - CARE PLAN
Principal Discharge DX:	Syncope  Goal:	Symptom resolution, medication compliance, prevent recurrent episodes, monitor and control risk factors  Instructions for follow-up, activity and diet:	Follow up with your Cardiologist within 1 week, call for appointment  Secondary Diagnosis:	Pacemaker  Goal:	Monitor site for bleeding, swelling, discharge, discoloration, call your physician  Instructions for follow-up, activity and diet:	Pacemaker clinic in 2 weeks an appointment was given to you.   Routine PPM evaluation, prevention of arrhythmia, medication compliance  Secondary Diagnosis:	DESIRAE (acute kidney injury)  Goal:	Symptom resolution, medication compliance, prevent disease progression  Secondary Diagnosis:	Prostate ca  Goal:	Symptom resolution, medication compliance, prevent disease progression  Instructions for follow-up, activity and diet:	Oncologist follow up as outpatient Principal Discharge DX:	Syncope  Goal:	Symptom resolution, medication compliance, prevent recurrent episodes, monitor and control risk factors  Instructions for follow-up, activity and diet:	Follow up with your Cardiologist within 1 week, call for appointment Dr Monroy  Secondary Diagnosis:	Pacemaker  Goal:	Monitor site for bleeding, swelling, discharge, discoloration, call your physician  Instructions for follow-up, activity and diet:	Pacemaker clinic in 2 weeks an appointment was given to you. 8/10/17 at 3 :15 PM   Routine PPM evaluation, prevention of arrhythmia, medication compliance  Secondary Diagnosis:	DESIRAE (acute kidney injury)  Goal:	Symptom resolution, medication compliance, prevent disease progression  Secondary Diagnosis:	Prostate ca  Goal:	Symptom resolution, medication compliance, prevent disease progression  Instructions for follow-up, activity and diet:	Oncologist follow up as outpatient

## 2017-07-29 NOTE — DISCHARGE NOTE ADULT - CARE PROVIDERS DIRECT ADDRESSES
,DirectAddress_Unknown,[email or web address],DirectAddress_Unknown

## 2017-07-29 NOTE — DISCHARGE NOTE ADULT - HOSPITAL COURSE
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   In the ED, pt was afebrile, hypertensive, other vitals WNL. His pacemaker was interrogated today.  No events recorded or correlated to syncopal event.  He was evaluated in device clinic last week on 7/18/17 and was found to Elective Replacement Indicator (LINDA) status of his pacemaker.  He was to be scheduled for generator change. 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   In the ED, pt was afebrile, hypertensive, other vitals WNL. His pacemaker was interrogated today.  No events recorded or correlated to syncopal event.  He was evaluated in device clinic last week on 7/18/17 and was found to Elective Replacement Indicator (LINDA) status of his pacemaker.  He was to be scheduled for generator change.    CXR preliminay= Slight right hemidiaphragm elevation. Clear lungs. No pleural effusions or pneumothorax. Stable right chest wall dual-lead pacemaker and cardiac and mediastinal  silhouettes including aortic arch calcifications. Tortuous descending thoracic aorta contour.  EKG A Paced @ 55b/ min, LVH  K+ 3.3  BUN/ Creatinine= 28/ 1.97  Glucose = 103  Diet =NPO after MN for PPM generator change on this admission   CE - neg x 2  7/26 PPM interegation:  BATTER at LINDA, No high ventricular rate events;  No high atrial rate events  Impression/Plan: p/w syncope; not pacer dependent; full EP consult to follow   No reprogramming.  7/26/17 EP consult  -Given his recent onset of tremor and unsteady gait, would consider neurology evaluation  -Obtain baseline echocardiogram -Check orthostatic hypotension  -Plan for PPM generator change on this admission   7/27_TTE: 1. Mitral annular calcification, otherwise normal mitral  valve. Minimal mitral regurgitation. 2. Mild concentric left ventricular hypertrophy. 3. Endocardium not well visualized; unable to evaluate left  ventricular systolic function. 4. Unable to accurately evaluate right ventricular size or systolic function. Consider limited repeat imaging with IV contrast administration for improved evaluation of LV systolic function, if clinically indicated.  7/27_CD: Normal right and left internal carotid arteries.  No hemodynamically significant stenosis noted.  7/27: Onc: Contacted to approve enzalutamide continuation while inpatient. Pt is a poor historian, but as per the history, is maintained on 160mg of enzalutamide as an outpatient for his metastatic prostate cancer. There is no obvious contraindication to continuation of treatment at this time, will recommend continuing while inpatient. Please contact with further questions.  7/28: EP: Plan for PPM gen change today  7/28 Ep: s/p PPM gen. change  7/28: s/p PPM gen change- site stable   7/29 Pt stable for discharge home today to jim Monroy and device clinic 8/10/17 84M admitted for syncopal episode. Pt with known orthostatic hypotension 2/2 autonomic dysfunction with frequent syncopal events when standing up too quickly. Seen by EP for PPM battery change    Problem/Plan - 1:  ·  Problem: Syncope, unspecified syncope type.  Plan: - Pt with known orthostatic hypotension, which appears to be persistent problem, trops neg, PPM interrogation wnl, TTE limited. Per d/w Dr. miguel Monroy who knows the pt well, this has been extensively worked up and has been a chronic problem in spite of treatment. He notes while the pt is compliant with meds he does not wear the compression stockings. He reports the propranolol is dosed by his OP cardiologist for treatment of tachyarrhythmias.  - Give compression stocking and d/w patient importance of wearing stocking, staying well hydrated in warm weather and standing up slowly, he expressed understanding  - Per d/w Dr. miguel Monroy orthostatics has been extensively investigated as OP, will not pursue IP neuro c/s given his tremor is c/w essential tremor and pt close and ongoing OP workup  - C/w  ASA, BB, Statin, midodrine, florinef     Problem/Plan - 2:  ·  Problem: Pacemaker.  Plan: s/p generator change, fu in device clinic.     Problem/Plan - 3:  ·  Problem: Essential hypertension.  Plan: DASH Diet  Continue BP meds.     Problem/Plan - 4:  ·  Problem: Prostate ca.  Plan: Continue Xtandi, will need onc c/s.     Problem/Plan - 5:  ·  Problem: DESIRAE (acute kidney injury).  Plan: Resolved, now at baseline.

## 2017-07-29 NOTE — PROGRESS NOTE ADULT - SUBJECTIVE AND OBJECTIVE BOX
Patient is a 84y old  Male who presents with a chief complaint of syncope and collapse (29 Jul 2017 00:32)      SUBJECTIVE / OVERNIGHT EVENTS: Pt without complaints, reports feeling at his baseline.    MEDICATIONS  (STANDING):  sodium chloride 0.9% lock flush 3 milliLiter(s) IV Push every 8 hours  fludroCORTISONE 0.1 milliGRAM(s) Oral daily  aspirin enteric coated 81 milliGRAM(s) Oral daily  midodrine 10 milliGRAM(s) Oral three times a day  atorvastatin 10 milliGRAM(s) Oral at bedtime  propranolol 10 milliGRAM(s) Oral two times a day  enzalutamide 160 milliGRAM(s) Oral daily  docusate sodium 100 milliGRAM(s) Oral two times a day  senna 2 Tablet(s) Oral at bedtime    MEDICATIONS  (PRN):      Vital Signs Last 24 Hrs  T(C): 36.5 (07-29-17 @ 14:36), Max: 36.8 (07-28-17 @ 19:50)  T(F): 97.7 (07-29-17 @ 14:36), Max: 98.3 (07-28-17 @ 19:50)  HR: 62 (07-29-17 @ 14:36) (60 - 65)  BP: 167/89 (07-29-17 @ 14:36) (98/50 - 190/96)  BP(mean): --  RR: 17 (07-29-17 @ 14:36) (17 - 18)  SpO2: 98% (07-29-17 @ 14:36) (98% - 100%)  CAPILLARY BLOOD GLUCOSE  109 (28 Jul 2017 18:24)        I&O's Summary    28 Jul 2017 07:01  -  29 Jul 2017 07:00  --------------------------------------------------------  IN: 0 mL / OUT: 100 mL / NET: -100 mL        PHYSICAL EXAM:  GENERAL: NAD, well-nourished  HEENT: mmm  CHEST/LUNG: CTABL, right upper chest ppm site c/d/i dressing  HEART: RRR, no m/r/g  ABDOMEN: soft, nt, nd  EXTREMITIES:  wwp, no c/c/e  PSYCH: AAOx3  NEUROLOGY: non-focal  SKIN: No rashes or lesions    LABS:                        11.7   4.51  )-----------( 140      ( 29 Jul 2017 09:43 )             37.2     07-29    142  |  100  |  31<H>  ----------------------------<  129<H>  3.9   |  27  |  1.84<H>    Ca    9.9      29 Jul 2017 09:43  Phos  4.1     07-29  Mg     2.4     07-29                RADIOLOGY & ADDITIONAL TESTS:    Imaging Personally Reviewed:    Consultant(s) Notes Reviewed:      Care Discussed with Consultants/Other Providers:

## 2017-07-29 NOTE — PROGRESS NOTE ADULT - SUBJECTIVE AND OBJECTIVE BOX
EP Progress Note    S/24 Events: No acute events overnight.     O:  T(C): 36.8 (07-29-17 @ 07:25), Max: 36.8 (07-28-17 @ 19:50)  HR: 61 (07-29-17 @ 07:25) (51 - 61)  BP: 190/96 (07-29-17 @ 07:25) (99/54 - 190/96)  RR: 18 (07-29-17 @ 07:25) (18 - 18)  SpO2: 100% (07-29-17 @ 07:25) (100% - 100%)      Tele: A paced 60s, PVCs    O/E:  Gen: NAD  HEENT: EOMI  CV: RRR, normal S1 + S2, no m/r/g  Lungs: CTAB  Abd: soft, non-tender  Ext: No edema    Labs:                        11.9   3.16  )-----------( 134      ( 28 Jul 2017 07:20 )             36.3     07-28    140  |  99  |  27<H>  ----------------------------<  101<H>  3.6   |  29  |  1.48<H>    Ca    10.2      28 Jul 2017 07:20  Mg     2.4     07-28                Meds:  MEDICATIONS  (STANDING):  sodium chloride 0.9% lock flush 3 milliLiter(s) IV Push every 8 hours  fludroCORTISONE 0.1 milliGRAM(s) Oral daily  aspirin enteric coated 81 milliGRAM(s) Oral daily  midodrine 10 milliGRAM(s) Oral three times a day  atorvastatin 10 milliGRAM(s) Oral at bedtime  propranolol 10 milliGRAM(s) Oral two times a day  enzalutamide 160 milliGRAM(s) Oral daily      A/P:  84 year old male with PMH of HTN, HLD, SND s/p PPM in 2005, vasovagal syncope (total 5 X), orthostatic hypotension-maintained on Florinef/midodrine, prostate Cancer s/p surgery presented to ED s/p syncopal episode.  Orthostatic hypotension seems the most likely etiology of his syncopal event. Patient is not pacer dependent with underlying rhythm SB in 50's bpm. s/p PPM gen change 7/28.     - PPM site c/d/i. No hematoma.   - Continue florinef, midodrine for orthostatic hypotension  - significant variation in documented BP noted. Please check manually. Consider adding amlodipine if persistently elevated.       Augustine Marquez  Cardiology Fellow  [id number] EP Progress Note    S/24 Events: No acute events overnight. Feels well. No CP, SOB.     O:  T(C): 36.8 (07-29-17 @ 07:25), Max: 36.8 (07-28-17 @ 19:50)  HR: 61 (07-29-17 @ 07:25) (51 - 61)  BP: 190/96 (07-29-17 @ 07:25) (99/54 - 190/96)  RR: 18 (07-29-17 @ 07:25) (18 - 18)  SpO2: 100% (07-29-17 @ 07:25) (100% - 100%)      Tele: A paced 60s, PVCs    O/E:  Gen: NAD  HEENT: EOMI  CV: RRR, normal S1 + S2, no m/r/g  Lungs: CTAB  Abd: soft, non-tender  Ext: No edema    Labs:                        11.9   3.16  )-----------( 134      ( 28 Jul 2017 07:20 )             36.3     07-28    140  |  99  |  27<H>  ----------------------------<  101<H>  3.6   |  29  |  1.48<H>    Ca    10.2      28 Jul 2017 07:20  Mg     2.4     07-28                Meds:  MEDICATIONS  (STANDING):  sodium chloride 0.9% lock flush 3 milliLiter(s) IV Push every 8 hours  fludroCORTISONE 0.1 milliGRAM(s) Oral daily  aspirin enteric coated 81 milliGRAM(s) Oral daily  midodrine 10 milliGRAM(s) Oral three times a day  atorvastatin 10 milliGRAM(s) Oral at bedtime  propranolol 10 milliGRAM(s) Oral two times a day  enzalutamide 160 milliGRAM(s) Oral daily      A/P:  84 year old male with PMH of HTN, HLD, SND s/p PPM in 2005, vasovagal syncope (total 5 X), orthostatic hypotension-maintained on Florinef/midodrine, prostate Cancer s/p surgery presented to ED s/p syncopal episode.  Orthostatic hypotension seems the most likely etiology of his syncopal event. Patient is not pacer dependent with underlying rhythm SB in 50's bpm. s/p PPM gen change 7/28.     - PPM site c/d/i. No hematoma.   - Continue florinef, midodrine for orthostatic hypotension  - significant variation in documented BP noted. Please check manually. Consider adding amlodipine if persistently elevated.       Augustine Zayas  Cardiology Fellow  [id number]

## 2017-07-29 NOTE — PROGRESS NOTE ADULT - PROBLEM SELECTOR PLAN 1
- Pt with known orthostatic hypotension, which appears to be persistent problem, trops neg, PPM interrogation wnl, TTE limited. Per d/w Dr. miguel Monroy who knows the pt well, this has been extensively worked up and has been a chronic problem in spite of treatment. He notes while the pt is compliant with meds he does not wear the compression stockings. He reports the propranolol is dosed by his OP cardiologist for treatment of tachyarrhythmias.  - Give compression stocking and d/w patient importance of wearing stocking, staying well hydrated in warm weather and standing up slowly, he expressed understanding  - Per d/w Dr. miguel Monroy orthostatics has been extensively investigated as OP, will not pursue IP neuro c/s given his tremor is c/w essential tremor and pt close and ongoing OP workup  - C/w  ASA, BB, Statin

## 2017-07-29 NOTE — DISCHARGE NOTE ADULT - OTHER SIGNIFICANT FINDINGS
Admit Diagnosis) Syncope and collapse  (PMH) Pacemaker  (PMH) HTN (hypertension)  (PMH) Prostate ca  (Principal DC/DX) Syncope  (Problem/DX) DESIRAE (acute kidney injury)  (Problem/DX) Prophylactic measure  (Problem/DX) Prostate ca  (Problem/DX) Essential hypertension  (Problem/DX) Pacemaker  (Problem/DX) Syncope, unspecified syncope type  (PSH) Pacemaker

## 2017-07-29 NOTE — DISCHARGE NOTE ADULT - PROVIDER TOKENS
TOKEN:'[id number]:'[id number]',FREE:[LAST:[Dr Monroy],PHONE:[(   )    -],FAX:[(   )    -],ADDRESS:[Your PCP]]

## 2017-07-29 NOTE — DISCHARGE NOTE ADULT - PATIENT PORTAL LINK FT
“You can access the [facility] Patient Portal, offered by [facility], by registering with the following website: [email or web address]”

## 2017-07-29 NOTE — DISCHARGE NOTE ADULT - CARE PROVIDER_API CALL
Robert Alcaraz), Urology  [address]  [place] [address]  Phone: [phone]  Fax: [phone]    Figueroa Sigala (MD), Cardiac Electrophysiology; Cardiovascular Disease; Internal Medicine  [address] [place] [id number]  Phone: [phone]  Fax: [phone]    Dr Monroy,   Your PCP  Phone: (   )    -  Fax: (   )    -

## 2017-07-29 NOTE — PROGRESS NOTE ADULT - ASSESSMENT
84M admitted for syncopal episode. Pt with known orthostatic hypotension 2/2 autonomic dysfunction with frequent syncopal events when standing up too quickly. Seen by EP for PPM battery change

## 2017-07-29 NOTE — DISCHARGE NOTE ADULT - MEDICATION SUMMARY - MEDICATIONS TO TAKE
I will START or STAY ON the medications listed below when I get home from the hospital:    Compression Stockings  -- B/L Compression stockings at 20 mmHG  -- Indication: For orthostatic hypotension low blood pressure    fludrocortisone 0.1 mg oral tablet  -- 1 tab(s) by mouth once a day  -- It is very important that you take or use this exactly as directed.  Do not skip doses or discontinue unless directed by your doctor.  Obtain medical advice before taking any non-prescription drugs as some may affect the action of this medication.  Take with food or milk.      -- Indication: For orthostatic hypotension     aspirin 81 mg oral delayed release tablet  -- 1 tab(s) by mouth once a day  -- Indication: For Prophylactic measure    propranolol 10 mg oral tablet  -- 1 tab(s) by mouth 2 times a day  -- Indication: For Sinus node dysfunction     pravastatin 40 mg oral tablet  -- 1 tab(s) by mouth once a day (at bedtime)  -- Indication: For Prophylactic measure    enzalutamide 40 mg oral capsule  -- 4 cap(s) by mouth once a day  -- Indication: For Prostate ca    midodrine 10 mg oral tablet  -- 1 tab(s) by mouth 3 times a day  -- Indication: For orthostatic hypotension

## 2017-08-01 ENCOUNTER — APPOINTMENT (OUTPATIENT)
Dept: ELECTROPHYSIOLOGY | Facility: CLINIC | Age: 82
End: 2017-08-01

## 2017-08-10 ENCOUNTER — APPOINTMENT (OUTPATIENT)
Dept: ELECTROPHYSIOLOGY | Facility: CLINIC | Age: 82
End: 2017-08-10
Payer: MEDICARE

## 2017-08-10 PROCEDURE — 99024 POSTOP FOLLOW-UP VISIT: CPT

## 2017-09-07 ENCOUNTER — APPOINTMENT (OUTPATIENT)
Dept: ELECTROPHYSIOLOGY | Facility: CLINIC | Age: 82
End: 2017-09-07

## 2017-09-12 ENCOUNTER — APPOINTMENT (OUTPATIENT)
Dept: ELECTROPHYSIOLOGY | Facility: CLINIC | Age: 82
End: 2017-09-12

## 2017-10-17 ENCOUNTER — APPOINTMENT (OUTPATIENT)
Dept: ELECTROPHYSIOLOGY | Facility: CLINIC | Age: 82
End: 2017-10-17

## 2017-11-14 ENCOUNTER — APPOINTMENT (OUTPATIENT)
Dept: ELECTROPHYSIOLOGY | Facility: CLINIC | Age: 82
End: 2017-11-14
Payer: MEDICARE

## 2017-11-14 PROCEDURE — 93294 REM INTERROG EVL PM/LDLS PM: CPT

## 2017-11-14 PROCEDURE — 93296 REM INTERROG EVL PM/IDS: CPT

## 2017-11-21 ENCOUNTER — APPOINTMENT (OUTPATIENT)
Dept: ELECTROPHYSIOLOGY | Facility: CLINIC | Age: 82
End: 2017-11-21

## 2018-02-22 ENCOUNTER — APPOINTMENT (OUTPATIENT)
Dept: ELECTROPHYSIOLOGY | Facility: CLINIC | Age: 83
End: 2018-02-22
Payer: MEDICARE

## 2018-02-22 VITALS — DIASTOLIC BLOOD PRESSURE: 74 MMHG | HEART RATE: 62 BPM | SYSTOLIC BLOOD PRESSURE: 126 MMHG

## 2018-02-22 PROCEDURE — 93280 PM DEVICE PROGR EVAL DUAL: CPT

## 2018-02-27 NOTE — H&P ADULT - RESPIRATORY
Spoke to Nery Martínez, pt's . Informed Nery that appt was cancelled b/c no one returned my call to confirm and the first appt was a no show. Nery states that pt was taken away form his parents and plced in foster care and she would like the appt to be r/s. Nery requested contact info for pt be updated with 's info. Informed Nery that info can only be updated when pt and guardian arrives for appt and furnishes proof guardianship. Appt scheduled. Nery verbalized understanding and will contact foster parents.    Foster mom- Torrie Randy [phone]  - Nery Martínez .[phone]   detailed exam

## 2018-06-07 ENCOUNTER — APPOINTMENT (OUTPATIENT)
Dept: ELECTROPHYSIOLOGY | Facility: CLINIC | Age: 83
End: 2018-06-07
Payer: MEDICARE

## 2018-06-07 PROCEDURE — 93294 REM INTERROG EVL PM/LDLS PM: CPT

## 2018-06-07 PROCEDURE — 93296 REM INTERROG EVL PM/IDS: CPT

## 2018-09-06 ENCOUNTER — APPOINTMENT (OUTPATIENT)
Dept: ELECTROPHYSIOLOGY | Facility: CLINIC | Age: 83
End: 2018-09-06
Payer: MEDICARE

## 2018-09-06 PROCEDURE — 93280 PM DEVICE PROGR EVAL DUAL: CPT

## 2018-09-06 RX ORDER — ENZALUTAMIDE 40 MG/1
40 CAPSULE ORAL
Refills: 0 | Status: ACTIVE | COMMUNITY
Start: 2017-07-18

## 2018-09-06 RX ORDER — MELOXICAM 7.5 MG/1
7.5 TABLET ORAL
Refills: 0 | Status: ACTIVE | COMMUNITY

## 2018-09-06 RX ORDER — MIDODRINE HYDROCHLORIDE 10 MG/1
10 TABLET ORAL 3 TIMES DAILY
Refills: 0 | Status: ACTIVE | COMMUNITY
Start: 2017-07-18

## 2018-09-06 RX ORDER — PRAVASTATIN SODIUM 40 MG/1
40 TABLET ORAL
Refills: 0 | Status: ACTIVE | COMMUNITY

## 2018-09-06 RX ORDER — AMLODIPINE BESYLATE 10 MG/1
10 TABLET ORAL DAILY
Refills: 0 | Status: ACTIVE | COMMUNITY

## 2018-09-06 RX ORDER — LEVETIRACETAM 1000 MG/1
1000 TABLET, FILM COATED ORAL TWICE DAILY
Refills: 0 | Status: ACTIVE | COMMUNITY

## 2018-12-05 ENCOUNTER — INPATIENT (INPATIENT)
Facility: HOSPITAL | Age: 83
LOS: 7 days | Discharge: NURSING, REHAB OR LONG-TERM CARE | DRG: 86 | End: 2018-12-13
Attending: STUDENT IN AN ORGANIZED HEALTH CARE EDUCATION/TRAINING PROGRAM | Admitting: HOSPITALIST
Payer: COMMERCIAL

## 2018-12-05 VITALS
DIASTOLIC BLOOD PRESSURE: 61 MMHG | SYSTOLIC BLOOD PRESSURE: 107 MMHG | HEART RATE: 76 BPM | HEIGHT: 69 IN | WEIGHT: 199.96 LBS | RESPIRATION RATE: 18 BRPM | OXYGEN SATURATION: 100 %

## 2018-12-05 DIAGNOSIS — R55 SYNCOPE AND COLLAPSE: ICD-10-CM

## 2018-12-05 DIAGNOSIS — Z71.89 OTHER SPECIFIED COUNSELING: ICD-10-CM

## 2018-12-05 DIAGNOSIS — R56.9 UNSPECIFIED CONVULSIONS: ICD-10-CM

## 2018-12-05 DIAGNOSIS — I95.1 ORTHOSTATIC HYPOTENSION: ICD-10-CM

## 2018-12-05 DIAGNOSIS — Z29.9 ENCOUNTER FOR PROPHYLACTIC MEASURES, UNSPECIFIED: ICD-10-CM

## 2018-12-05 DIAGNOSIS — S06.5X9A TRAUMATIC SUBDURAL HEMORRHAGE WITH LOSS OF CONSCIOUSNESS OF UNSPECIFIED DURATION, INITIAL ENCOUNTER: ICD-10-CM

## 2018-12-05 DIAGNOSIS — C61 MALIGNANT NEOPLASM OF PROSTATE: ICD-10-CM

## 2018-12-05 DIAGNOSIS — I10 ESSENTIAL (PRIMARY) HYPERTENSION: ICD-10-CM

## 2018-12-05 LAB
ALBUMIN SERPL ELPH-MCNC: 4.1 G/DL — SIGNIFICANT CHANGE UP (ref 3.3–5)
ALP SERPL-CCNC: 91 U/L — SIGNIFICANT CHANGE UP (ref 40–120)
ALT FLD-CCNC: 7 U/L — LOW (ref 10–45)
ANION GAP SERPL CALC-SCNC: 14 MMOL/L — SIGNIFICANT CHANGE UP (ref 5–17)
APTT BLD: 21.6 SEC — LOW (ref 27.5–36.3)
AST SERPL-CCNC: 10 U/L — SIGNIFICANT CHANGE UP (ref 10–40)
BASOPHILS # BLD AUTO: 0 K/UL — SIGNIFICANT CHANGE UP (ref 0–0.2)
BASOPHILS NFR BLD AUTO: 0 % — SIGNIFICANT CHANGE UP (ref 0–2)
BILIRUB SERPL-MCNC: 0.4 MG/DL — SIGNIFICANT CHANGE UP (ref 0.2–1.2)
BLD GP AB SCN SERPL QL: NEGATIVE — SIGNIFICANT CHANGE UP
BUN SERPL-MCNC: 27 MG/DL — HIGH (ref 7–23)
CALCIUM SERPL-MCNC: 9.8 MG/DL — SIGNIFICANT CHANGE UP (ref 8.4–10.5)
CHLORIDE SERPL-SCNC: 101 MMOL/L — SIGNIFICANT CHANGE UP (ref 96–108)
CO2 SERPL-SCNC: 24 MMOL/L — SIGNIFICANT CHANGE UP (ref 22–31)
CREAT SERPL-MCNC: 1.54 MG/DL — HIGH (ref 0.5–1.3)
EOSINOPHIL # BLD AUTO: 0.1 K/UL — SIGNIFICANT CHANGE UP (ref 0–0.5)
EOSINOPHIL NFR BLD AUTO: 1.4 % — SIGNIFICANT CHANGE UP (ref 0–6)
GLUCOSE SERPL-MCNC: 116 MG/DL — HIGH (ref 70–99)
HCT VFR BLD CALC: 30.3 % — LOW (ref 39–50)
HGB BLD-MCNC: 10.7 G/DL — LOW (ref 13–17)
INR BLD: 0.97 RATIO — SIGNIFICANT CHANGE UP (ref 0.88–1.16)
LYMPHOCYTES # BLD AUTO: 1.4 K/UL — SIGNIFICANT CHANGE UP (ref 1–3.3)
LYMPHOCYTES # BLD AUTO: 29.4 % — SIGNIFICANT CHANGE UP (ref 13–44)
MCHC RBC-ENTMCNC: 34.2 PG — HIGH (ref 27–34)
MCHC RBC-ENTMCNC: 35.2 GM/DL — SIGNIFICANT CHANGE UP (ref 32–36)
MCV RBC AUTO: 97.2 FL — SIGNIFICANT CHANGE UP (ref 80–100)
MONOCYTES # BLD AUTO: 0.4 K/UL — SIGNIFICANT CHANGE UP (ref 0–0.9)
MONOCYTES NFR BLD AUTO: 8.2 % — SIGNIFICANT CHANGE UP (ref 2–14)
NEUTROPHILS # BLD AUTO: 2.9 K/UL — SIGNIFICANT CHANGE UP (ref 1.8–7.4)
NEUTROPHILS NFR BLD AUTO: 61.1 % — SIGNIFICANT CHANGE UP (ref 43–77)
PLATELET # BLD AUTO: 174 K/UL — SIGNIFICANT CHANGE UP (ref 150–400)
POTASSIUM SERPL-MCNC: 3.8 MMOL/L — SIGNIFICANT CHANGE UP (ref 3.5–5.3)
POTASSIUM SERPL-SCNC: 3.8 MMOL/L — SIGNIFICANT CHANGE UP (ref 3.5–5.3)
PROT SERPL-MCNC: 6.7 G/DL — SIGNIFICANT CHANGE UP (ref 6–8.3)
PROTHROM AB SERPL-ACNC: 11.2 SEC — SIGNIFICANT CHANGE UP (ref 10–12.9)
RBC # BLD: 3.12 M/UL — LOW (ref 4.2–5.8)
RBC # FLD: 12.1 % — SIGNIFICANT CHANGE UP (ref 10.3–14.5)
RH IG SCN BLD-IMP: POSITIVE — SIGNIFICANT CHANGE UP
RH IG SCN BLD-IMP: POSITIVE — SIGNIFICANT CHANGE UP
SODIUM SERPL-SCNC: 139 MMOL/L — SIGNIFICANT CHANGE UP (ref 135–145)
WBC # BLD: 4.7 K/UL — SIGNIFICANT CHANGE UP (ref 3.8–10.5)
WBC # FLD AUTO: 4.7 K/UL — SIGNIFICANT CHANGE UP (ref 3.8–10.5)

## 2018-12-05 PROCEDURE — 70450 CT HEAD/BRAIN W/O DYE: CPT | Mod: 26

## 2018-12-05 PROCEDURE — 72128 CT CHEST SPINE W/O DYE: CPT | Mod: 26

## 2018-12-05 PROCEDURE — 72131 CT LUMBAR SPINE W/O DYE: CPT | Mod: 26

## 2018-12-05 PROCEDURE — 99223 1ST HOSP IP/OBS HIGH 75: CPT | Mod: GC

## 2018-12-05 PROCEDURE — 70450 CT HEAD/BRAIN W/O DYE: CPT | Mod: 26,77

## 2018-12-05 PROCEDURE — 99284 EMERGENCY DEPT VISIT MOD MDM: CPT | Mod: 25

## 2018-12-05 RX ORDER — DEXAMETHASONE 0.5 MG/5ML
4 ELIXIR ORAL AT BEDTIME
Qty: 0 | Refills: 0 | Status: DISCONTINUED | OUTPATIENT
Start: 2018-12-05 | End: 2018-12-05

## 2018-12-05 RX ORDER — ENZALUTAMIDE 80 MG/1
160 TABLET ORAL DAILY
Qty: 0 | Refills: 0 | Status: DISCONTINUED | OUTPATIENT
Start: 2018-12-05 | End: 2018-12-13

## 2018-12-05 RX ORDER — LEVETIRACETAM 250 MG/1
1000 TABLET, FILM COATED ORAL
Qty: 0 | Refills: 0 | Status: DISCONTINUED | OUTPATIENT
Start: 2018-12-05 | End: 2018-12-05

## 2018-12-05 RX ORDER — ENZALUTAMIDE 80 MG/1
40 TABLET ORAL
Qty: 0 | Refills: 0 | Status: DISCONTINUED | OUTPATIENT
Start: 2018-12-05 | End: 2018-12-05

## 2018-12-05 RX ORDER — ENZALUTAMIDE 80 MG/1
40 TABLET ORAL DAILY
Qty: 0 | Refills: 0 | Status: DISCONTINUED | OUTPATIENT
Start: 2018-12-05 | End: 2018-12-05

## 2018-12-05 RX ORDER — DEXAMETHASONE 0.5 MG/5ML
4 ELIXIR ORAL ONCE
Qty: 0 | Refills: 0 | Status: COMPLETED | OUTPATIENT
Start: 2018-12-05 | End: 2018-12-05

## 2018-12-05 RX ORDER — DEXAMETHASONE 0.5 MG/5ML
4 ELIXIR ORAL AT BEDTIME
Qty: 0 | Refills: 0 | Status: COMPLETED | OUTPATIENT
Start: 2018-12-05 | End: 2018-12-05

## 2018-12-05 RX ORDER — DESMOPRESSIN ACETATE 0.1 MG/1
36 TABLET ORAL ONCE
Qty: 0 | Refills: 0 | Status: DISCONTINUED | OUTPATIENT
Start: 2018-12-05 | End: 2018-12-05

## 2018-12-05 RX ORDER — DEXAMETHASONE 0.5 MG/5ML
4 ELIXIR ORAL ONCE
Qty: 0 | Refills: 0 | Status: DISCONTINUED | OUTPATIENT
Start: 2018-12-05 | End: 2018-12-05

## 2018-12-05 RX ORDER — HYDRALAZINE HCL 50 MG
5 TABLET ORAL ONCE
Qty: 0 | Refills: 0 | Status: DISCONTINUED | OUTPATIENT
Start: 2018-12-05 | End: 2018-12-07

## 2018-12-05 RX ORDER — LEVETIRACETAM 250 MG/1
1000 TABLET, FILM COATED ORAL ONCE
Qty: 0 | Refills: 0 | Status: COMPLETED | OUTPATIENT
Start: 2018-12-05 | End: 2018-12-05

## 2018-12-05 RX ORDER — LEVETIRACETAM 250 MG/1
1000 TABLET, FILM COATED ORAL AT BEDTIME
Qty: 0 | Refills: 0 | Status: COMPLETED | OUTPATIENT
Start: 2018-12-05 | End: 2018-12-05

## 2018-12-05 RX ORDER — LEVETIRACETAM 250 MG/1
1000 TABLET, FILM COATED ORAL ONCE
Qty: 0 | Refills: 0 | Status: DISCONTINUED | OUTPATIENT
Start: 2018-12-05 | End: 2018-12-05

## 2018-12-05 RX ORDER — LEVETIRACETAM 250 MG/1
1 TABLET, FILM COATED ORAL
Qty: 0 | Refills: 0 | COMMUNITY

## 2018-12-05 RX ORDER — DESMOPRESSIN ACETATE 0.1 MG/1
36 TABLET ORAL ONCE
Qty: 0 | Refills: 0 | Status: COMPLETED | OUTPATIENT
Start: 2018-12-05 | End: 2018-12-05

## 2018-12-05 RX ORDER — LEVETIRACETAM 250 MG/1
1000 TABLET, FILM COATED ORAL
Qty: 0 | Refills: 0 | Status: DISCONTINUED | OUTPATIENT
Start: 2018-12-06 | End: 2018-12-13

## 2018-12-05 RX ORDER — DEXAMETHASONE 0.5 MG/5ML
4 ELIXIR ORAL
Qty: 0 | Refills: 0 | Status: DISCONTINUED | OUTPATIENT
Start: 2018-12-06 | End: 2018-12-13

## 2018-12-05 RX ADMIN — Medication 4 MILLIGRAM(S): at 23:22

## 2018-12-05 RX ADMIN — DESMOPRESSIN ACETATE 236 MICROGRAM(S): 0.1 TABLET ORAL at 02:58

## 2018-12-05 RX ADMIN — LEVETIRACETAM 1000 MILLIGRAM(S): 250 TABLET, FILM COATED ORAL at 23:22

## 2018-12-05 RX ADMIN — Medication 4 MILLIGRAM(S): at 02:59

## 2018-12-05 RX ADMIN — LEVETIRACETAM 400 MILLIGRAM(S): 250 TABLET, FILM COATED ORAL at 02:58

## 2018-12-05 NOTE — H&P ADULT - PROBLEM SELECTOR PLAN 4
Reports being diagnosed by doctors at a OSH with seizures and has been on Keppra 1,000 mg BID daily. However patient has never followed up with a neurologist.  - Neurology consulted appreciate recs Reports being diagnosed by doctors at a OS with seizures and has been on Keppra 1,000 mg BID daily. However patient has never followed up with a neurologist.  - Neurology consulted appreciate tess Lopez

## 2018-12-05 NOTE — ED PROVIDER NOTE - OBJECTIVE STATEMENT
Stacy Ladd, DO: 85M with hx of HTN, prostate CA on oral chemo q daily transferred from OSH for SDH. Patient with unwitnessed fall today. No blood thinners. Patient AAOx2. Stacy Samanthanina, DO: 85M with hx of HTN, prostate CA on oral chemo q daily transferred from OSH for SDH. Patient with unwitnessed fall today. No blood thinners. Patient AAOx2.    Dr. Lupe Boo

## 2018-12-05 NOTE — CONSULT NOTE ADULT - ATTENDING COMMENTS
I performed a history and physical examination of the patient and discussed the management of the patient with the resident. I reviewed the resident's note and agree with the documented findings and plan of care with the following additions/exceptions.    DOS 12/6/18    On exam this morning he was very difficult to arouse from sleep and only groaned to noxious. This afternoon he was better, sitting up in chair being fed. He has a left facial droop. Left arm was hanging to his side this morning but legs withdrew b/l.    recommend repeat HCT and veeg monitoring    continue AED

## 2018-12-05 NOTE — H&P ADULT - NSHPLABSRESULTS_GEN_ALL_CORE
LABS:                        10.7   4.7   )-----------( 174      ( 05 Dec 2018 01:23 )             30.3     05 Dec 2018 01:23    139    |  101    |  27     ----------------------------<  116    3.8     |  24     |  1.54     Ca    9.8        05 Dec 2018 01:23    TPro  6.7    /  Alb  4.1    /  TBili  0.4    /  DBili  x      /  AST  10     /  ALT  7      /  AlkPhos  91     05 Dec 2018 01:23    PT/INR - ( 05 Dec 2018 01:23 )   PT: 11.2 sec;   INR: 0.97 ratio         PTT - ( 05 Dec 2018 01:23 )  PTT:21.6 sec  CAPILLARY BLOOD GLUCOSE        BLOOD CULTURE    RADIOLOGY & ADDITIONAL TESTS:    Imaging Personally Reviewed:  [ ] YES

## 2018-12-05 NOTE — ED PROVIDER NOTE - ATTENDING CONTRIBUTION TO CARE
****ATTENDING**** 84yo male hx listed transferred from OSH for ICH. As per family pt has history of syncope and fall and today had witnessed falls wo any obvious head trauma. Unknown if had head injury prior, has fallen in past unwitnessed. As per family today pt appeared more weak so brought him to the ER.   On exam, Patient is awake,alert,oriented x 2. Patient is well appearing and in no acute distress. Patient's chest is clear to ausculation, +s1s2. Abdomen is soft nd/nt +BS. LUE and LLE weakness 3+/5, RUE/RLE 5/5. B/L 2+ edema b/l. NO midline c spine tenderness. + L1-4 tenderness.   Check labs, CT to eval injury, Neurosurgery consult. Pt is on Aspirin.

## 2018-12-05 NOTE — H&P ADULT - PROBLEM SELECTOR PLAN 7
DVT: ICD given SDH  DIet: NPO given mental status changes, aspiration risk until neurology eval   Dispo: Pending PT eval

## 2018-12-05 NOTE — CONSULT NOTE ADULT - SUBJECTIVE AND OBJECTIVE BOX
p (1480)     HPI: Dom Wallace, 85M with hx of multiple syncopal episodes that began several years ago, on asa81, dementia, PPM, HTN, prostate CA on oral chemo (PLT WNL) in ER after episode of possible syncopal fall this evening (unwitnessed).  Poor baseline: lives with family 24 hours, nurse every other week, needs help with changing/feeding self. CTH showed left mixed density SDH, minimal shift.    PAST MEDICAL HISTORY   Pacemaker  HTN (hypertension)  Prostate ca    PAST SURGICAL HISTORY   Pacemaker        MEDICATIONS:  Antibiotics:    Neuro:    Anticoagulation:    Other:      SOCIAL HISTORY:   Occupation:   Marital Status:     FAMILY HISTORY:  No pertinent family history in first degree relatives      REVIEW OF SYSTEMS:  Check here if all are normal other than Neurological/HPI [x]  General:  Eyes:  ENT:  Cardiac:  Respiratory:  GI:  Musculoskeletal:   Skin:  Neurologic:   Psychiatric:     PHYSICAL EXAMINATION:   T(C): 36.5 (12-05-18 @ 01:48), Max: 36.5 (12-05-18 @ 01:48)  HR: 66 (12-05-18 @ 01:48) (66 - 76)  BP: 87/43 (12-05-18 @ 01:48) (87/43 - 107/61)  RR: 18 (12-05-18 @ 01:48) (18 - 18)  SpO2: 100% (12-05-18 @ 01:48) (100% - 100%)  Wt(kg): --Height (cm): 175.26 (12-05 @ 00:59)  Weight (kg): 90.7 (12-05 @ 00:59)    General Examination:     Neurologic Examination:           AO x1, interm fc, DOMINIQUE strong    LABS:                        10.7   4.7   )-----------( 174      ( 05 Dec 2018 01:23 )             30.3     12-05    139  |  101  |  27<H>  ----------------------------<  116<H>  3.8   |  24  |  1.54<H>    Ca    9.8      05 Dec 2018 01:23    TPro  6.7  /  Alb  4.1  /  TBili  0.4  /  DBili  x   /  AST  10  /  ALT  7<L>  /  AlkPhos  91  12-05    PT/INR - ( 05 Dec 2018 01:23 )   PT: 11.2 sec;   INR: 0.97 ratio         PTT - ( 05 Dec 2018 01:23 )  PTT:21.6 sec      RADIOLOGY & ADDITIONAL STUDIES: p (1480)     HPI: Dom Wallace, 85M with hx of multiple syncopal episodes that began several years ago, on asa81, dementia, PPM, HTN, prostate CA on oral chemo (PLT WNL) in ER after episode of possible syncopal fall this evening.  Poor baseline: lives with family 24 hours, nurse every other week, needs help with changing/feeding self. CTH showed left mixed density SDH, minimal shift.    PAST MEDICAL HISTORY   Pacemaker  HTN (hypertension)  Prostate ca    PAST SURGICAL HISTORY   Pacemaker        MEDICATIONS:  Antibiotics:    Neuro:    Anticoagulation:    Other:      SOCIAL HISTORY:   Occupation:   Marital Status:     FAMILY HISTORY:  No pertinent family history in first degree relatives      REVIEW OF SYSTEMS:  Check here if all are normal other than Neurological/HPI [x]  General:  Eyes:  ENT:  Cardiac:  Respiratory:  GI:  Musculoskeletal:   Skin:  Neurologic:   Psychiatric:     PHYSICAL EXAMINATION:   T(C): 36.5 (12-05-18 @ 01:48), Max: 36.5 (12-05-18 @ 01:48)  HR: 66 (12-05-18 @ 01:48) (66 - 76)  BP: 87/43 (12-05-18 @ 01:48) (87/43 - 107/61)  RR: 18 (12-05-18 @ 01:48) (18 - 18)  SpO2: 100% (12-05-18 @ 01:48) (100% - 100%)  Wt(kg): --Height (cm): 175.26 (12-05 @ 00:59)  Weight (kg): 90.7 (12-05 @ 00:59)    General Examination:     Neurologic Examination:           AO x1, interm fc, DOMINIQUE strong    LABS:                        10.7   4.7   )-----------( 174      ( 05 Dec 2018 01:23 )             30.3     12-05    139  |  101  |  27<H>  ----------------------------<  116<H>  3.8   |  24  |  1.54<H>    Ca    9.8      05 Dec 2018 01:23    TPro  6.7  /  Alb  4.1  /  TBili  0.4  /  DBili  x   /  AST  10  /  ALT  7<L>  /  AlkPhos  91  12-05    PT/INR - ( 05 Dec 2018 01:23 )   PT: 11.2 sec;   INR: 0.97 ratio         PTT - ( 05 Dec 2018 01:23 )  PTT:21.6 sec      RADIOLOGY & ADDITIONAL STUDIES:

## 2018-12-05 NOTE — CONSULT NOTE ADULT - ASSESSMENT
Pt is an 86 yo M with a PMH of Dementia (A&Ox2-3), HTN, Orthostatic Hypotension and possible Seizures (though possible he is only on Keppra due to prior SDH and frequent falls) who is admitted for right SDH after a fall. On exam pt is A&Ox1 and with left sided weakness. Head CT showing a right acute SDH admixed with a subacute SDH with some midline shift. Story does not sound too concerning for seizure being the cause of fall (more likely syncope), but cannot be ruled out at this point due to lack of information. Weakness is secondary to the SDH. NIHSS: 5. MRS: 2.    Recommendations:  - rEEG  - Neurosurgery following  - Pt already loaded with Keppra, DDAVP and Platelets  - c/w home dose Keppra 1000mg BID  - No need for Keppra level (given already received load)  - Ativan 2mg IV q5mins (max x2) PRN sustained seizure activity lasting greater than 3 minutes  - Seizure Precautions

## 2018-12-05 NOTE — H&P ADULT - PROBLEM SELECTOR PLAN 1
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   - Repeat NCHCT Q12 for interval changes   - s/p DDAVP, decadron. No surgical intervention at this time  - Neurosurgery following, appreciate recs in regards to resuming aspirin   - c/w keppra and measure serum keppra levels

## 2018-12-05 NOTE — ED PROVIDER NOTE - PHYSICAL EXAMINATION
Stacy Ladd, DO:   Gen: Well appearing, NAD  Head: NCAT  HEENT: PERRL 2mm, EOMI, MMM, normal conjunctiva, anicteric, neck supple  Lung: CTAB, no adventitious sounds  CV: RRR, no murmurs  Abd: soft, NTND, no rebound or guarding  MSK: No edema, no visible deformities  Neuro: 3/5 strength of RLE and 2/5 of LLE & b/l UE. Ambulatory with stable gait. Following commands.   Skin: Warm and dry, no evidence of rash  Psych: normal mood and affect Stacy Ladd, DO:   Gen: Well appearing, NAD  Head: NCAT  HEENT: PERRL 2mm, EOMI, MMM, normal conjunctiva, anicteric, neck supple  Lung: CTAB, no adventitious sounds  CV: RRR, no murmurs  Abd: soft, NTND, no rebound or guarding  MSK: No edema, no visible deformities  Neuro: 4/5 strength of RLE and 2/5 of LLE & 5/5 b/l UE. . Ambulatory with stable gait. Following commands.   Skin: Warm and dry, no evidence of rash  Psych: normal mood and affect

## 2018-12-05 NOTE — CONSULT NOTE ADULT - SUBJECTIVE AND OBJECTIVE BOX
HPI:  Pt is an 84 yo M with a PMH of Dementia (A&Ox2-3), HTN, Orthostatic Hypotension and possible Seizures (though possible he is only on Keppra due to prior SDH and frequent falls) who is being admitted for right SDH after an unwitnessed fall. As per wife at bedside she heard him knocking on the wall rapidly so she went to see him finding him getting up from the floor, at his baseline. No tongue biting or urinary/bowel incontinence. He has not been able to follow up with his neurologist since discharge after last SDH. NIHSS: 5. MRS: 2.      MEDICATIONS  (STANDING):  dexamethasone     Tablet 4 milliGRAM(s) Oral at bedtime  levETIRAcetam 1000 milliGRAM(s) Oral at bedtime    MEDICATIONS  (PRN):    PAST MEDICAL & SURGICAL HISTORY:  Seizure  Orthostatic hypotension  Pacemaker: Guidant. placed 1995. Dr Jovon SUTTON  HTN (hypertension)  Prostate ca  Pacemaker    FAMILY HISTORY:  No pertinent family history in first degree relatives    Allergies    No Known Allergies    Intolerances    SHx - No smoking, No ETOH, No drug abuse    Review of Systems:  See HPI.    Vital Signs Last 24 Hrs  T(C): 36.3 (05 Dec 2018 12:04), Max: 37.1 (05 Dec 2018 03:00)  T(F): 97.4 (05 Dec 2018 12:04), Max: 98.8 (05 Dec 2018 03:00)  HR: 59 (05 Dec 2018 12:04) (59 - 82)  BP: 137/67 (05 Dec 2018 12:04) (87/43 - 150/98)  BP(mean): --  RR: 17 (05 Dec 2018 12:04) (16 - 18)  SpO2: 100% (05 Dec 2018 12:04) (96% - 100%)      General Exam:   General appearance: No acute distress    Neurological Exam:  Mental Status: Orientated to self.  Attention decreased. Mild dysarthria. Speech fluent.  Cranial Nerves: PERRL, EOMI, VFF grossly, no nystagmus. Moderate left lower facial droop. Tongue, uvula and palate midline.  Sternocleidomastoid and Trapezius intact bilaterally.    Motor:   Tone: normal.                  Strength:     [] Upper extremity                      Delt       Bicep    Tricep                                                  R         5/5        5/5        5/5       5/5                                               L          4/5        4/5        4/5       4/5  [] Lower extremity                       HF          KE          KF        DF         PF                                               R        5/5 5/5 5/5 5/5 5/5                                               L         5/5 5/5 5/5 5/5 5/5  Drift: LUE/LLE  Dysmetria: None to finger-nose-finger b/l (though limited on the LUE)  No truncal ataxia.    Tremor: Diffuse resting tremor (chronic).  No myoclonus.    Sensation: intact to light touch throughout, no extinction    Toes downgoing b/l      12-05    139  |  101  |  27<H>  ----------------------------<  116<H>  3.8   |  24  |  1.54<H>    Ca    9.8      05 Dec 2018 01:23    TPro  6.7  /  Alb  4.1  /  TBili  0.4  /  DBili  x   /  AST  10  /  ALT  7<L>  /  AlkPhos  91  12-05               10.7   4.7   )-----------( 174      ( 05 Dec 2018 01:23 )             30.3

## 2018-12-05 NOTE — H&P ADULT - PROBLEM SELECTOR PLAN 2
Presentation syncope vs CVA vs seizure   Patient with reported history of syncopal episodes every other day for the last four years. Also with history of orthostatic hypotension on fludrocortisone. Also with reported   - Will obtain set of orthostatics then restart home fludrocortisone .1 mg daily  - admit to tele

## 2018-12-05 NOTE — H&P ADULT - NSHPSOCIALHISTORY_GEN_ALL_CORE
Retired, mainly bedbound due to concern for syncope, ambulates with cane, lives with wife and has a home health aide every other week, dependent on family for ADLs, including changing and eating. Denies EtOH and tobacco consumption.

## 2018-12-05 NOTE — H&P ADULT - ATTENDING COMMENTS
Briefly, 85F with dementia, orthostatic hypotension, seizure dx, presents after fall at home with subsequent lethargy. As per family, recent multiple falls but no LOC, head trauma. At baseline, pt AAOx3, needs some assistance with ADLs. ROS, PMHx, PSHx, SH, FH reviewed as above. Vitals: stable on admission. PE: rigidity on RUE; motor strength 3/5 LUE, 4/5 RUE. Labs: hgb 10.7 (baseline 11-12), Cr 1.5 (baseline). CTH: Mixed density right frontoparietal convexity subdural hematoma with acute hemorrhagic products, mild mass effect on the right cerebral sulci and leftward midline shift of 2 mm. EKG: NSR without acute ischemic changes.    A/P: multiple falls, cannot rule out CVA vs. arrythmia    -admit to tele and monitor for arrythmia  -appreciate neurosurgery inputs: s/p DDAVP, decadron. No surgical intervention at this time. Will repeat CTH in 12hrs for interval changes. Appreciate recs on resuming aspirin  -given exam findings, likely previous CVA. Consult neurology  -continue keppra and measure keppra level  -keep NPO for now until mental status improves  -DVT ppx: SCDs in setting of acute SDH    Alysha Zapata MD  Division of Hospital Medicine  Pager: [phone]  Office: [phone]

## 2018-12-05 NOTE — CONSULT NOTE ADULT - ASSESSMENT
Dom Wallace, 85M with hx of multiple syncopal episodes that began several years ago, on asa81, dementia, PPM, HTN, prostate CA on oral chemo (PLT WNL) in ER after episode of possible syncopal fall this evening (unwitnessed).  Poor baseline: lives with family 24 hours, nurse every other week, needs help with changing/feeding self. CTH showed left mixed density SDH, not much shift.    PLAN: keppra 1 bid, 4 dex bid, medicine for syncope work up, family requests conservative management at this time, ddavp plt, cth interval and in afternoon Dom Wallace, 85M with hx of multiple syncopal episodes that began several years ago, on asa81, dementia, PPM, HTN, prostate CA on oral chemo (PLT WNL) in ER after episode of possible syncopal fall this evening.  Poor baseline: lives with family 24 hours, nurse every other week, needs help with changing/feeding self. CTH showed left mixed density SDH, not much shift.    PLAN: keppra 1 bid, 4 dex bid, medicine for syncope work up, family requests conservative management at this time, ddavp plt, cth interval and in afternoon

## 2018-12-05 NOTE — H&P ADULT - NSHPREVIEWOFSYSTEMS_GEN_ALL_CORE
CONSTITUTIONAL: No fever, weight loss, or fatigue  EYES: No eye pain, visual disturbances, or discharge  ENMT:  No difficulty hearing, tinnitus, vertigo; No sinus or throat pain  RESPIRATORY: No cough, wheezing, chills or hemoptysis; No shortness of breath  CARDIOVASCULAR: No chest pain, palpitations, dizziness, or leg swelling  GASTROINTESTINAL: No abdominal or epigastric pain. No nausea, vomiting, or hematemesis; No diarrhea or constipation. No melena or hematochezia.  GENITOURINARY: No dysuria, frequency, hematuria, or incontinence  NEUROLOGICAL: No headaches, loss of strength, numbness, or tremors  SKIN: No itching, burning, rashes, or lesions   LYMPH NODES: No enlarged glands  ENDOCRINE: No heat or cold intolerance; No polydipsia or polyuria  MUSCULOSKELETAL: No joint pain or swelling;   PSYCHIATRIC: Denies depression, anxiety  HEME/LYMPH: No easy bruising, or bleeding gums  ALLERGY AND IMMUNOLOGIC: No hives or eczema CONSTITUTIONAL: No fever, weight loss, or fatigue  EYES: No eye pain, visual disturbances, or discharge  ENMT:  No difficulty hearing, tinnitus, vertigo; No sinus or throat pain  RESPIRATORY: No cough, wheezing, chills or hemoptysis; No shortness of breath  CARDIOVASCULAR: No chest pain, palpitations, dizziness, or leg swelling  GASTROINTESTINAL: No abdominal or epigastric pain. No nausea, vomiting, or hematemesis; No diarrhea or constipation. No melena or hematochezia.  GENITOURINARY: No dysuria, frequency, hematuria, or incontinence  NEUROLOGICAL: No headaches; +loss of strength LUE, + tremors  SKIN: No itching, burning, rashes, or lesions   LYMPH NODES: No enlarged glands  ENDOCRINE: No heat or cold intolerance; No polydipsia or polyuria  MUSCULOSKELETAL: No joint pain or swelling;   PSYCHIATRIC: Denies depression, anxiety  HEME/LYMPH: No easy bruising, or bleeding gums  ALLERGY AND IMMUNOLOGIC: No hives or eczema

## 2018-12-05 NOTE — ED ADULT NURSE REASSESSMENT NOTE - NS ED NURSE REASSESS COMMENT FT1
Report received from Ayaka HATFIELD. Pt A&Ox1 (oriented to self) with family at bedside. VSS. Transfer from [place] for SDH. Admitted to telemetry, RTM. Please see paper chart for neuro checks. Bed in lowest position. Call bell within reach. Will continue to monitor.

## 2018-12-05 NOTE — ED ADULT NURSE NOTE - NSIMPLEMENTINTERV_GEN_ALL_ED
Implemented All Fall with Harm Risk Interventions:  [place]raper to call system. Call bell, personal items and telephone within reach. Instruct patient to call for assistance. Room bathroom lighting operational. Non-slip footwear when patient is off stretcher. Physically safe environment: no spills, clutter or unnecessary equipment. Stretcher in lowest position, wheels locked, appropriate side rails in place. Provide visual cue, wrist band, yellow gown, etc. Monitor gait and stability. Monitor for mental status changes and reorient to person, place, and time. Review medications for side effects contributing to fall risk. Reinforce activity limits and safety measures with patient and family. Provide visual clues: red socks.

## 2018-12-05 NOTE — ED ADULT NURSE NOTE - OBJECTIVE STATEMENT
84 y/o male presented to the ED s/p Dx subdural hematoma that has a 2.5 cm right sided shift. pt was seen and treated at [facility] was transfer to [facility] for Neuro Sx consult. pt is A&Ox2, on baseline pt is normally A&Ox3, wife states today at 1600 pt was in bathroom and had syncopal episode. unk time for LOC. wife denies pt hitting head. pt had multiple falls in the past week. on presentation. pt is on room air with no signs of distress. pt is A&Ox2, coherent speech. follows commands. is on cardiac monitor. Neuro flow sheet in paper chart. pt is able to move R arm. all other extremities weak. PERRL. will cont to preform hr neuro cks. pt family is at bedside. will cont to monitor.

## 2018-12-05 NOTE — H&P ADULT - PMH
HTN (hypertension)    Orthostatic hypotension    Pacemaker  Guidant. placed 1995. Dr Jovon SUTTON  Prostate ca    Seizure

## 2018-12-05 NOTE — H&P ADULT - HISTORY OF PRESENT ILLNESS
84 yo M with PMHx of dementia, HTN, orthostatic hypotension, seizures, 84 yo M with PMHx of dementia, HTN, orthostatic hypotension, seizures, who presents transferred from an outside hospital for further workup on a sub dural hematoma seen on imaging at OSH, after a fall at home. Per collateral obtained from wife at bedside, patient fell yesterday afternoon, but was awake and at his baseline mental status (reportedly A&Ox3), no head trauma fell on his buttocks in a seated position. EMS was called and instructed the patient by laid on the ground on his side. Afterwards, the patient became less responsive and more lethargic. SDH seen at OSH, along with left sided weakness, which prompted the transfer to [facility]. Reportedly, the patient has had frequent falls at home that started 4 years ago, but has progressed in the last year or so. He was worked up for syncope a few years ago and diagnosed with orthostatic hypotension (on fludrocortisone), also diagnosed with seizures? was discharged from the outside hospital on Keppra, but never followed up with a neurologist due to insurance issues. Reportedly compliant with medications. According to daughter, patient has two types of falls, one where he is alert and oriented, vs one where he is lethargic and minimally responsive afterwards (postictal?). At baseline patient lives with wife, ambulates with cane, needs assistance with ADLs including bathing, changing and eating large meals. No recent illness or sick contacts, no chest pain, no shortness of breath.     In the ED, patient presented A&Ox2 86 yo M with PMHx of dementia, HTN, orthostatic hypotension, seizures, who presents transferred from an outside hospital for further workup on a sub dural hematoma seen on imaging at OSH, after a fall at home. Per collateral obtained from wife at bedside, patient fell yesterday afternoon, but was awake and at his baseline mental status (reportedly A&Ox3), no head trauma fell on his buttocks in a seated position. EMS was called and instructed the patient by laid on the ground on his side. Afterwards, the patient became less responsive and more lethargic. SDH seen at OSH, along with left sided weakness, which prompted the transfer to [facility]. Reportedly, the patient has had frequent falls at home that started 4 years ago, but has progressed in the last year or so. He was worked up for syncope a few years ago and diagnosed with orthostatic hypotension (on fludrocortisone), also diagnosed with seizures? was discharged from the outside hospital on Keppra, but never followed up with a neurologist due to insurance issues. Reportedly compliant with medications. According to daughter, patient has two types of falls, one where he is alert and oriented, vs one where he is lethargic and minimally responsive afterwards (postictal?). At baseline patient lives with wife, ambulates with cane, needs assistance with ADLs including bathing, changing and eating large meals. No recent illness or sick contacts, no chest pain, no shortness of breath.     In the ED, patient presented A&Ox2, Afebrile, VSS, NS consulted SDH on CT mixed, conservative management, with serial CT head scans, 1dose of Keppra, ddVAP and dexamethasone administered.

## 2018-12-05 NOTE — H&P ADULT - NSHPPHYSICALEXAM_GEN_ALL_CORE
Vital Signs Last 24 Hrs  T(C): 36.3 (05 Dec 2018 12:04), Max: 37.1 (05 Dec 2018 03:00)  T(F): 97.4 (05 Dec 2018 12:04), Max: 98.8 (05 Dec 2018 03:00)  HR: 59 (05 Dec 2018 12:04) (59 - 82)  BP: 137/67 (05 Dec 2018 12:04) (87/43 - 150/98)  BP(mean): --  RR: 17 (05 Dec 2018 12:04) (16 - 18)  SpO2: 100% (05 Dec 2018 12:04) (96% - 100%)  I&O's Summary      PHYSICAL EXAM:  GENERAL: NAD  HEAD:  Atraumatic, Normocephalic  EYES: EOMI, PERRLA, conjunctiva and sclera clear  ENMT: No tonsillar erythema, exudates, or enlargement; Moist mucous membranes, Good dentition  NECK: Supple, No JVD  NERVOUS SYSTEM: AOX3, motor and sensation grossly intact in b/l UE and b/l LE  PSYCHIATRIC: Appropriate affect and mood  CHEST/LUNG: Clear to auscultation bilaterally; No rales, rhonchi, wheezing, or rubs  HEART: Regular rate and rhythm; No murmurs, rubs, or gallops. No LE edema  ABDOMEN: Soft, Nontender, Nondistended; Bowel sounds present  EXTREMITIES:  2+ Peripheral Pulses, No clubbing, cyanosis  SKIN: No rashes or lesions

## 2018-12-05 NOTE — H&P ADULT - PROBLEM SELECTOR PLAN 5
Patient with known Prostate cancer on oral chemotherapy   CT thoracic and Lumbar spine with signs of possible metastatic spread   - c/w enzalutamide 4 tabs daily

## 2018-12-05 NOTE — H&P ADULT - ASSESSMENT
85 year old Male PMHx of dementia, HTN, orthostatic hypotension, seizures, who presents transferred from an outside hospital for further workup on a sub dural hematoma seen on imaging at OSH, after a fall at home. DDx for this presentation includes syncope vs. arrythmia vs seizures, CVA

## 2018-12-06 LAB
ANION GAP SERPL CALC-SCNC: 13 MMOL/L — SIGNIFICANT CHANGE UP (ref 5–17)
APPEARANCE UR: CLEAR — SIGNIFICANT CHANGE UP
BILIRUB UR-MCNC: NEGATIVE — SIGNIFICANT CHANGE UP
BUN SERPL-MCNC: 47 MG/DL — HIGH (ref 7–23)
CALCIUM SERPL-MCNC: 10.1 MG/DL — SIGNIFICANT CHANGE UP (ref 8.4–10.5)
CHLORIDE SERPL-SCNC: 100 MMOL/L — SIGNIFICANT CHANGE UP (ref 96–108)
CO2 SERPL-SCNC: 24 MMOL/L — SIGNIFICANT CHANGE UP (ref 22–31)
COLOR SPEC: YELLOW — SIGNIFICANT CHANGE UP
CREAT SERPL-MCNC: 1.99 MG/DL — HIGH (ref 0.5–1.3)
DIFF PNL FLD: NEGATIVE — SIGNIFICANT CHANGE UP
GLUCOSE SERPL-MCNC: 115 MG/DL — HIGH (ref 70–99)
GLUCOSE UR QL: 100 MG/DL
KETONES UR-MCNC: NEGATIVE — SIGNIFICANT CHANGE UP
LEUKOCYTE ESTERASE UR-ACNC: NEGATIVE — SIGNIFICANT CHANGE UP
MAGNESIUM SERPL-MCNC: 2.8 MG/DL — HIGH (ref 1.6–2.6)
NITRITE UR-MCNC: NEGATIVE — SIGNIFICANT CHANGE UP
PH UR: 5 — SIGNIFICANT CHANGE UP (ref 5–8)
PHOSPHATE SERPL-MCNC: 4 MG/DL — SIGNIFICANT CHANGE UP (ref 2.5–4.5)
POTASSIUM SERPL-MCNC: 4.3 MMOL/L — SIGNIFICANT CHANGE UP (ref 3.5–5.3)
POTASSIUM SERPL-SCNC: 4.3 MMOL/L — SIGNIFICANT CHANGE UP (ref 3.5–5.3)
PROT UR-MCNC: NEGATIVE MG/DL — SIGNIFICANT CHANGE UP
SODIUM SERPL-SCNC: 137 MMOL/L — SIGNIFICANT CHANGE UP (ref 135–145)
SP GR SPEC: 1.02 — SIGNIFICANT CHANGE UP (ref 1.01–1.02)
UROBILINOGEN FLD QL: NEGATIVE MG/DL — SIGNIFICANT CHANGE UP

## 2018-12-06 PROCEDURE — 99222 1ST HOSP IP/OBS MODERATE 55: CPT

## 2018-12-06 PROCEDURE — 99233 SBSQ HOSP IP/OBS HIGH 50: CPT | Mod: GC

## 2018-12-06 PROCEDURE — 70450 CT HEAD/BRAIN W/O DYE: CPT | Mod: 26

## 2018-12-06 PROCEDURE — 71045 X-RAY EXAM CHEST 1 VIEW: CPT | Mod: 26

## 2018-12-06 RX ORDER — MIRABEGRON 50 MG/1
60 TABLET, EXTENDED RELEASE ORAL
Qty: 0 | Refills: 0 | COMMUNITY

## 2018-12-06 RX ORDER — AMLODIPINE BESYLATE 2.5 MG/1
5 TABLET ORAL DAILY
Qty: 0 | Refills: 0 | Status: DISCONTINUED | OUTPATIENT
Start: 2018-12-06 | End: 2018-12-07

## 2018-12-06 RX ORDER — TAMSULOSIN HYDROCHLORIDE 0.4 MG/1
0.4 CAPSULE ORAL AT BEDTIME
Qty: 0 | Refills: 0 | Status: DISCONTINUED | OUTPATIENT
Start: 2018-12-06 | End: 2018-12-13

## 2018-12-06 RX ADMIN — LEVETIRACETAM 1000 MILLIGRAM(S): 250 TABLET, FILM COATED ORAL at 05:25

## 2018-12-06 RX ADMIN — AMLODIPINE BESYLATE 5 MILLIGRAM(S): 2.5 TABLET ORAL at 17:22

## 2018-12-06 RX ADMIN — LEVETIRACETAM 1000 MILLIGRAM(S): 250 TABLET, FILM COATED ORAL at 17:22

## 2018-12-06 RX ADMIN — Medication 4 MILLIGRAM(S): at 17:22

## 2018-12-06 RX ADMIN — TAMSULOSIN HYDROCHLORIDE 0.4 MILLIGRAM(S): 0.4 CAPSULE ORAL at 21:56

## 2018-12-06 RX ADMIN — Medication 4 MILLIGRAM(S): at 05:25

## 2018-12-06 NOTE — PROGRESS NOTE ADULT - PROBLEM SELECTOR PLAN 7
DVT: ICD given SDH  DIet: Passed bedside speech and swallow, mechanical soft diet  Dispo: Pending PT eval

## 2018-12-06 NOTE — PROGRESS NOTE ADULT - PROBLEM SELECTOR PLAN 4
Reports being diagnosed by doctors at a OSH with seizures and has been on Keppra 750 mg BID daily. However patient has never followed up with a neurologist.  - Neurology consulted appreciate tess  - Jessica  - c/w Keppra 1000mg BID

## 2018-12-06 NOTE — PROGRESS NOTE ADULT - PROBLEM SELECTOR PLAN 1
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   - Repeat NCHCT Q12 for interval changes; stable   - s/p DDAVP, decadron. No surgical intervention at this time  - Neurosurgery following, appreciate recs in regards to resuming aspirin   - c/w keppra and measure serum keppra levels

## 2018-12-06 NOTE — PROGRESS NOTE ADULT - PROBLEM SELECTOR PLAN 2
Presentation syncope vs CVA vs seizure   Patient with reported history of syncopal episodes every other day for the last four years. Also with history of orthostatic hypotension on fludrocortisone. Also with reported   - Patient with labile BP yesterday, will consider fludrocortisone .1 mg daily  - admit to tele

## 2018-12-06 NOTE — PROGRESS NOTE ADULT - PROBLEM SELECTOR PLAN 3
Reported history of previously diagnosed orthostatic hypotension  - holding fludrocortisone .1mg daily  - also on midodrine 10mg TID at home

## 2018-12-06 NOTE — PROGRESS NOTE ADULT - SUBJECTIVE AND OBJECTIVE BOX
Seun Miles MD DARRYL  Internal Medicine PGY-1  Pager [facility]: [phone]; [facility] [id number]    Patient is a 85y old  Male who presents with a chief complaint of DSH, syncopal workup (05 Dec 2018 15:03)      SUBJECTIVE/OVERNIGHT EVENTS   No acute events overnight. BP more controlled during the night. Mental status not improved, left sided weakness stable. Afebrile.     OBJECTIVE  Vital Signs Last 24 Hrs  T(C): 36.7 (06 Dec 2018 03:46), Max: 37.4 (06 Dec 2018 00:52)  T(F): 98 (06 Dec 2018 03:46), Max: 99.3 (06 Dec 2018 00:52)  HR: 64 (06 Dec 2018 03:46) (59 - 67)  BP: 157/74 (06 Dec 2018 03:46) (112/64 - 212/98)  BP(mean): --  RR: 18 (06 Dec 2018 03:46) (16 - 18)  SpO2: 98% (06 Dec 2018 03:46) (98% - 100%)    I&O's Summary    05 Dec 2018 07:01  -  06 Dec 2018 07:00  --------------------------------------------------------  IN: 0 mL / OUT: 250 mL / NET: -250 mL    PHYSICAL EXAM:  GENERAL: NAD, well-developed  HEAD:  Atraumatic, Normocephalic  EYES: EOMI, conjunctiva and sclera clear  NECK: Supple, No JVD  CHEST/LUNG: Clear to auscultation bilaterally; No wheeze  HEART: Regular rate and rhythm; No murmurs, rubs, or gallops  ABDOMEN: Soft, Nontender, Nondistended; Bowel sounds present  EXTREMITIES:  2+ Peripheral Pulses, No clubbing, cyanosis, or edema  PSYCH: AAOx1  NEUROLOGY: R sided hypertonicity, 3/5 strength in the LUE  SKIN: No rashes or lesions    LABS                        10.7   4.7   )-----------( 174      ( 05 Dec 2018 01:23 )             30.3     12-05    139  |  101  |  27<H>  ----------------------------<  116<H>  3.8   |  24  |  1.54<H>    Ca    9.8      05 Dec 2018 01:23    TPro  6.7  /  Alb  4.1  /  TBili  0.4  /  DBili  x   /  AST  10  /  ALT  7<L>  /  AlkPhos  91  12-05    CAPILLARY BLOOD GLUCOSE    PT/INR - ( 05 Dec 2018 01:23 )   PT: 11.2 sec;   INR: 0.97 ratio         PTT - ( 05 Dec 2018 01:23 )  PTT:21.6 sec      Urinalysis Basic - ( 06 Dec 2018 02:27 )    Color: Yellow / Appearance: Clear / S.017 / pH: x  Gluc: x / Ketone: Negative  / Bili: Negative / Urobili: Negative mg/dL   Blood: x / Protein: Negative mg/dL / Nitrite: Negative   Leuk Esterase: Negative / RBC: x / WBC x   Sq Epi: x / Non Sq Epi: x / Bacteria: x    RADIOLOGY & ADDITIONAL TESTS:  < from: CT Head No Cont (18 @ 15:50) >  IMPRESSION:    Stable right-sided mixed density subdural hematoma with minimal leftward   midline shift.    < end of copied text >      MEDICATIONS  (STANDING):  dexamethasone     Tablet 4 milliGRAM(s) Oral two times a day  enzalutamide 160 milliGRAM(s) Oral daily  hydrALAZINE Injectable 5 milliGRAM(s) IV Push once  levETIRAcetam 1000 milliGRAM(s) Oral two times a day    MEDICATIONS  (PRN):

## 2018-12-07 ENCOUNTER — TRANSCRIPTION ENCOUNTER (OUTPATIENT)
Age: 83
End: 2018-12-07

## 2018-12-07 LAB
ALBUMIN SERPL ELPH-MCNC: 4 G/DL — SIGNIFICANT CHANGE UP (ref 3.3–5)
ALP SERPL-CCNC: 92 U/L — SIGNIFICANT CHANGE UP (ref 40–120)
ALT FLD-CCNC: 8 U/L — LOW (ref 10–45)
ANION GAP SERPL CALC-SCNC: 12 MMOL/L — SIGNIFICANT CHANGE UP (ref 5–17)
AST SERPL-CCNC: 21 U/L — SIGNIFICANT CHANGE UP (ref 10–40)
BILIRUB SERPL-MCNC: 1 MG/DL — SIGNIFICANT CHANGE UP (ref 0.2–1.2)
BUN SERPL-MCNC: 47 MG/DL — HIGH (ref 7–23)
CALCIUM SERPL-MCNC: 9.9 MG/DL — SIGNIFICANT CHANGE UP (ref 8.4–10.5)
CHLORIDE SERPL-SCNC: 97 MMOL/L — SIGNIFICANT CHANGE UP (ref 96–108)
CO2 SERPL-SCNC: 25 MMOL/L — SIGNIFICANT CHANGE UP (ref 22–31)
CREAT SERPL-MCNC: 1.59 MG/DL — HIGH (ref 0.5–1.3)
GLUCOSE SERPL-MCNC: 138 MG/DL — HIGH (ref 70–99)
MAGNESIUM SERPL-MCNC: 2.6 MG/DL — SIGNIFICANT CHANGE UP (ref 1.6–2.6)
PHOSPHATE SERPL-MCNC: 3.3 MG/DL — SIGNIFICANT CHANGE UP (ref 2.5–4.5)
POTASSIUM SERPL-MCNC: 4.1 MMOL/L — SIGNIFICANT CHANGE UP (ref 3.5–5.3)
POTASSIUM SERPL-SCNC: 4.1 MMOL/L — SIGNIFICANT CHANGE UP (ref 3.5–5.3)
PROT SERPL-MCNC: 6.9 G/DL — SIGNIFICANT CHANGE UP (ref 6–8.3)
SODIUM SERPL-SCNC: 134 MMOL/L — LOW (ref 135–145)

## 2018-12-07 PROCEDURE — 95819 EEG AWAKE AND ASLEEP: CPT | Mod: 26

## 2018-12-07 PROCEDURE — 99233 SBSQ HOSP IP/OBS HIGH 50: CPT | Mod: GC

## 2018-12-07 RX ORDER — HYDRALAZINE HCL 50 MG
5 TABLET ORAL ONCE
Qty: 0 | Refills: 0 | Status: COMPLETED | OUTPATIENT
Start: 2018-12-07 | End: 2018-12-07

## 2018-12-07 RX ORDER — AMLODIPINE BESYLATE 2.5 MG/1
10 TABLET ORAL DAILY
Qty: 0 | Refills: 0 | Status: DISCONTINUED | OUTPATIENT
Start: 2018-12-07 | End: 2018-12-13

## 2018-12-07 RX ORDER — AMLODIPINE BESYLATE 2.5 MG/1
10 TABLET ORAL DAILY
Qty: 0 | Refills: 0 | Status: DISCONTINUED | OUTPATIENT
Start: 2018-12-07 | End: 2018-12-07

## 2018-12-07 RX ORDER — LEVETIRACETAM 250 MG/1
1 TABLET, FILM COATED ORAL
Qty: 60 | Refills: 0 | OUTPATIENT
Start: 2018-12-07 | End: 2019-01-05

## 2018-12-07 RX ORDER — ATORVASTATIN CALCIUM 80 MG/1
10 TABLET, FILM COATED ORAL AT BEDTIME
Qty: 0 | Refills: 0 | Status: DISCONTINUED | OUTPATIENT
Start: 2018-12-07 | End: 2018-12-13

## 2018-12-07 RX ORDER — LEVETIRACETAM 250 MG/1
1 TABLET, FILM COATED ORAL
Qty: 0 | Refills: 0 | COMMUNITY

## 2018-12-07 RX ORDER — FLUDROCORTISONE ACETATE 0.1 MG/1
0.1 TABLET ORAL DAILY
Qty: 0 | Refills: 0 | Status: DISCONTINUED | OUTPATIENT
Start: 2018-12-07 | End: 2018-12-13

## 2018-12-07 RX ADMIN — AMLODIPINE BESYLATE 5 MILLIGRAM(S): 2.5 TABLET ORAL at 05:40

## 2018-12-07 RX ADMIN — ENZALUTAMIDE 160 MILLIGRAM(S): 80 TABLET ORAL at 20:27

## 2018-12-07 RX ADMIN — ATORVASTATIN CALCIUM 10 MILLIGRAM(S): 80 TABLET, FILM COATED ORAL at 20:23

## 2018-12-07 RX ADMIN — LEVETIRACETAM 1000 MILLIGRAM(S): 250 TABLET, FILM COATED ORAL at 17:19

## 2018-12-07 RX ADMIN — LEVETIRACETAM 1000 MILLIGRAM(S): 250 TABLET, FILM COATED ORAL at 05:40

## 2018-12-07 RX ADMIN — Medication 5 MILLIGRAM(S): at 16:35

## 2018-12-07 RX ADMIN — AMLODIPINE BESYLATE 10 MILLIGRAM(S): 2.5 TABLET ORAL at 11:33

## 2018-12-07 RX ADMIN — Medication 4 MILLIGRAM(S): at 05:40

## 2018-12-07 RX ADMIN — Medication 4 MILLIGRAM(S): at 17:19

## 2018-12-07 RX ADMIN — TAMSULOSIN HYDROCHLORIDE 0.4 MILLIGRAM(S): 0.4 CAPSULE ORAL at 20:22

## 2018-12-07 RX ADMIN — FLUDROCORTISONE ACETATE 0.1 MILLIGRAM(S): 0.1 TABLET ORAL at 18:56

## 2018-12-07 NOTE — DISCHARGE NOTE ADULT - PROVIDER TOKENS
FREE:[LAST:[Porfirio],FIRST:[Cande],PHONE:[phone]],FAX:[(   )    -],ADDRESS:[[address] [place] [address]]] FREE:[LAST:[Ramdamonpal],FIRST:[Martala],PHONE:[phone]],FAX:[(   )    -],ADDRESS:[[address] [place] [address]]],TOKEN:'[id number]',FREE:[LAST:[[facility] [place]],PHONE:[phone]],FAX:[(   )    -],ADDRESS:[[address] [place] [address],   [place] [address]]]

## 2018-12-07 NOTE — PROGRESS NOTE ADULT - ASSESSMENT
85 year old Male PMHx of dementia, HTN, orthostatic hypotension, seizures, who presents transferred from an outside hospital for further workup on a sub dural hematoma seen on imaging at OSH, after a fall at home. DDx for this presentation includes syncope vs. arrythmia vs seizures, CVA 85 year old Male PMHx of dementia, HTN, orthostatic hypotension, seizures, who presents transferred from an outside hospital for further workup on a sub dural hematoma seen on imaging at OSH, after a fall at home. DDx for this presentation includes syncope vs. arrythmia vs seizures; SDH stable mental status improving

## 2018-12-07 NOTE — PHYSICAL THERAPY INITIAL EVALUATION ADULT - PERTINENT HX OF CURRENT PROBLEM, REHAB EVAL
Pt is a 86 yo M with PMHx of dementia, HTN, orthostatic hypotension, seizures, who presents transferred from an outside hospital for further workup on a sub dural hematoma seen on imaging at OSH, after a fall at home.  SDH seen at OSH, along with left sided weakness, which prompted the transfer to [facility]. Reportedly, the patient has had frequent falls at home that started 4 years ago, but has progressed in the last year or so.

## 2018-12-07 NOTE — PROVIDER CONTACT NOTE (OTHER) - BACKGROUND
85M h/o dementia, HTN, orthostatic hypotension on midodrine / fludrocortisone, seizure disorder adm to hospitals 12/5 after a fall, found to have SDH
Pt admitted for SDH
Pt. admitted with syncope and collapse

## 2018-12-07 NOTE — PHYSICAL THERAPY INITIAL EVALUATION ADULT - PRECAUTIONS/LIMITATIONS, REHAB EVAL
continued from above:  He was worked up for syncope a few years ago and diagnosed with orthostatic hypotension (on fludrocortisone), also diagnosed with seizures? was discharged from the outside hospital on Keppra, but never followed up with a neurologist due to insurance issues. Reportedly compliant with medications. CTH (12/6):Similar-appearing right frontal mixed density subdural collection measuring up to 2.5 cm in greatest depth. Underlying sulcal effacement

## 2018-12-07 NOTE — DISCHARGE NOTE ADULT - PATIENT PORTAL LINK FT
You can access the [facility], offered by [facility], by registering with the following website: [email or web address]

## 2018-12-07 NOTE — DISCHARGE NOTE ADULT - ADDITIONAL INSTRUCTIONS
Please continue to take dexamethasone twice a day and follow up with Dr. Chavarria of Neurosurgery within two weeks, who may stop it at that time.   Follow up with your primary care physician in regard to your hypertension, this may be stopped after you stop taking the dexamethasone

## 2018-12-07 NOTE — PROGRESS NOTE ADULT - PROBLEM SELECTOR PLAN 3
Reported history of previously diagnosed orthostatic hypotension  - holding fludrocortisone .1mg daily  - also on midodrine 10mg TID at home Reported history of previously diagnosed orthostatic hypotension  - holding fludrocortisone .1mg daily  - holding midodrine 10mg TID (home med) Reported history of previously diagnosed orthostatic hypotension  - consider starting fludrocortisone .1mg daily after EEG  - holding midodrine 10mg TID (home med)

## 2018-12-07 NOTE — PROGRESS NOTE ADULT - PROBLEM SELECTOR PLAN 2
Presentation syncope vs CVA vs seizure   Patient with reported history of syncopal episodes every other day for the last four years. Also with history of orthostatic hypotension on fludrocortisone. Also with reported   - Patient with labile BP yesterday, will consider fludrocortisone .1 mg daily  - admit to tele Presentation syncope vs CVA vs seizure   Patient with reported history of syncopal episodes every other day for the last four years. Also with history of orthostatic hypotension on fludrocortisone. Also with reported   - holding fludrocortisone and midodrine due to hypertension  - monitor on tele Presentation syncope vs CVA vs seizure   Patient with reported history of syncopal episodes every other day for the last four years. Also with history of orthostatic hypotension on fludrocortisone. Also with reported   - restart fludrocortisone .1mg   - continue to hold midodrine 10mg TID  - monitor on tele

## 2018-12-07 NOTE — DISCHARGE NOTE ADULT - PLAN OF CARE
Stabilize bleeding Subdural hematoma can be a medical emergency. It’s usually caused by a head injury strong enough to burst blood vessels. This can cause pooled blood to push on the brain. Age, blood-thinning drugs, and alcohol abuse increase risk.  Headache, confusion, vomiting, slurred speech, or coma may appear immediately or weeks after a head injury. In some cases, a subdural hematoma may not cause symptoms.  Small or symptomless subdural hematomas may not need treatment and only need to be watched over time. Syncope is a temporary loss of consciousness usually related to insufficient blood flow to the brain. It's also called fainting or "passing out." It most often occurs when blood pressure is too low (hypotension) and the heart doesn't pump enough oxygen to the brain. This presentation could be due to syncope due to orthostatic hypotension. To rule out a cardiac source of syncope we monitored you on telemetry. This was likely due to orthostatic hypotension. Orthostatic hypotension is a condition in which your blood pressure falls significantly when you stand up quickly. (Hypotension is low blood pressure.)The main symptom of this condition is feeling dizzy or faint when you stand. You were previously taking Midodrine and fludrocortisone to treat this. Please stop taking Midodrine and take fludrocortisone daily as prescribed. A seizure is a sudden, uncontrolled electrical disturbance in the brain. It can cause changes in your behavior, movements or feelings, and in levels of consciousness. You reported a previous history of seizures and was taking Keppra. We increased your keppra dose while you were admitted. Please continue to take Keppra as prescribed. You reported a history of prostate cancer, on chemotherapy. There was imaging this admission concerning for possible metastatic spread of the prostate cancer. Please follow up with your primary care physician to discuss this further. You were previously diagnosed with high blood pressure. Please continue on your home medications at this time and follow up with your PMD for further surveillance and management of your high blood pressure. Please stop taking the midodrine and continue amlodipine as prescribed. Subdural hematoma can be a medical emergency. It’s usually caused by a head injury strong enough to burst blood vessels. This can cause pooled blood to push on the brain. Age, blood-thinning drugs, and alcohol abuse increase risk.  Headache, confusion, vomiting, slurred speech, or coma may appear immediately or weeks after a head injury. In some cases, a subdural hematoma may not cause symptoms.  Small or symptomless subdural hematomas may not need treatment and only need to be watched over time. Please follow up with Dr. Chavarria at the number and address provided below. Orthostatic hypotension is a condition in which your blood pressure falls significantly when you stand up quickly. (Hypotension is low blood pressure.)The main symptom of this condition is feeling dizzy or faint when you stand. You were previously taking Midodrine and fludrocortisone to treat this. Please stop taking Midodrine, and take fludrocortisone daily as prescribed. A seizure is a sudden, uncontrolled electrical disturbance in the brain. It can cause changes in your behavior, movements or feelings, and in levels of consciousness. You reported a previous history of seizures and was taking Keppra. We performed a routine EEG and did not find any seizure like activity. However you will need to continue the Keppra given the sub dural hematoma. We increased your keppra dose while you were admitted. Please continue to take Keppra as prescribed. Please follow up with the [facility] center, call to make an appointment at [phone] You were previously diagnosed with high blood pressure. We added Hydralazine to help better control your blood pressure this admission. We believe that the dexamethasone (steroid, to prevent complication due to bleeding in your head) may have caused your blood pressure to become higher. Please continue on your home medications at this time and follow up with your PMD for further surveillance and management of your high blood pressure, as you may not need hydralazine after you stop the dexamethasone. Please continue amlodipine as prescribed.

## 2018-12-07 NOTE — PHYSICAL THERAPY INITIAL EVALUATION ADULT - IMPAIRMENTS FOUND, PT EVAL
aerobic capacity/endurance/gait, locomotion, and balance/gross motor/neuromotor development and sensory integration/poor safety awareness/posture/muscle strength

## 2018-12-07 NOTE — DISCHARGE NOTE ADULT - SECONDARY DIAGNOSIS.
Syncope, unspecified syncope type Orthostatic hypotension Seizure Prostate ca Hypertension, unspecified type

## 2018-12-07 NOTE — PROGRESS NOTE ADULT - PROBLEM SELECTOR PLAN 5
Patient with known Prostate cancer on oral chemotherapy   CT thoracic and Lumbar spine with signs of possible metastatic spread   - c/w enzalutamide 4 tabs daily -c/w home dose amlodipine 10

## 2018-12-07 NOTE — PROVIDER CONTACT NOTE (OTHER) - RECOMMENDATIONS
Need assessment, Ext IJ catheter need to be removed ( pt is transferred from [facility])
Adjust BP meds accordingly
notify md

## 2018-12-07 NOTE — DISCHARGE NOTE ADULT - CARE PLAN
Principal Discharge DX:	SDH (subdural hematoma)  Goal:	Stabilize bleeding  Assessment and plan of treatment:	Subdural hematoma can be a medical emergency. It’s usually caused by a head injury strong enough to burst blood vessels. This can cause pooled blood to push on the brain. Age, blood-thinning drugs, and alcohol abuse increase risk.  Headache, confusion, vomiting, slurred speech, or coma may appear immediately or weeks after a head injury. In some cases, a subdural hematoma may not cause symptoms.  Small or symptomless subdural hematomas may not need treatment and only need to be watched over time.  Secondary Diagnosis:	Syncope, unspecified syncope type  Assessment and plan of treatment:	Syncope is a temporary loss of consciousness usually related to insufficient blood flow to the brain. It's also called fainting or "passing out." It most often occurs when blood pressure is too low (hypotension) and the heart doesn't pump enough oxygen to the brain. This presentation could be due to syncope due to orthostatic hypotension. To rule out a cardiac source of syncope we monitored you on telemetry. This was likely due to orthostatic hypotension.  Secondary Diagnosis:	Orthostatic hypotension  Assessment and plan of treatment:	Orthostatic hypotension is a condition in which your blood pressure falls significantly when you stand up quickly. (Hypotension is low blood pressure.)The main symptom of this condition is feeling dizzy or faint when you stand. You were previously taking Midodrine and fludrocortisone to treat this. Please stop taking Midodrine and take fludrocortisone daily as prescribed.  Secondary Diagnosis:	Seizure  Assessment and plan of treatment:	A seizure is a sudden, uncontrolled electrical disturbance in the brain. It can cause changes in your behavior, movements or feelings, and in levels of consciousness. You reported a previous history of seizures and was taking Keppra. We increased your keppra dose while you were admitted. Please continue to take Keppra as prescribed.  Secondary Diagnosis:	Prostate ca  Assessment and plan of treatment:	You reported a history of prostate cancer, on chemotherapy. There was imaging this admission concerning for possible metastatic spread of the prostate cancer. Please follow up with your primary care physician to discuss this further.  Secondary Diagnosis:	Hypertension, unspecified type  Assessment and plan of treatment:	You were previously diagnosed with high blood pressure. Please continue on your home medications at this time and follow up with your PMD for further surveillance and management of your high blood pressure. Please stop taking the midodrine and continue amlodipine as prescribed. Principal Discharge DX:	SDH (subdural hematoma)  Goal:	Stabilize bleeding  Assessment and plan of treatment:	Subdural hematoma can be a medical emergency. It’s usually caused by a head injury strong enough to burst blood vessels. This can cause pooled blood to push on the brain. Age, blood-thinning drugs, and alcohol abuse increase risk.  Headache, confusion, vomiting, slurred speech, or coma may appear immediately or weeks after a head injury. In some cases, a subdural hematoma may not cause symptoms.  Small or symptomless subdural hematomas may not need treatment and only need to be watched over time. Please follow up with Dr. Chavarria at the number and address provided below.  Secondary Diagnosis:	Syncope, unspecified syncope type  Assessment and plan of treatment:	Syncope is a temporary loss of consciousness usually related to insufficient blood flow to the brain. It's also called fainting or "passing out." It most often occurs when blood pressure is too low (hypotension) and the heart doesn't pump enough oxygen to the brain. This presentation could be due to syncope due to orthostatic hypotension. To rule out a cardiac source of syncope we monitored you on telemetry. This was likely due to orthostatic hypotension.  Secondary Diagnosis:	Orthostatic hypotension  Assessment and plan of treatment:	Orthostatic hypotension is a condition in which your blood pressure falls significantly when you stand up quickly. (Hypotension is low blood pressure.)The main symptom of this condition is feeling dizzy or faint when you stand. You were previously taking Midodrine and fludrocortisone to treat this. Please stop taking Midodrine, and take fludrocortisone daily as prescribed.  Secondary Diagnosis:	Seizure  Assessment and plan of treatment:	A seizure is a sudden, uncontrolled electrical disturbance in the brain. It can cause changes in your behavior, movements or feelings, and in levels of consciousness. You reported a previous history of seizures and was taking Keppra. We performed a routine EEG and did not find any seizure like activity. However you will need to continue the Keppra given the sub dural hematoma. We increased your keppra dose while you were admitted. Please continue to take Keppra as prescribed. Please follow up with the [facility] center, call to make an appointment at [phone]  Secondary Diagnosis:	Prostate ca  Assessment and plan of treatment:	You reported a history of prostate cancer, on chemotherapy. There was imaging this admission concerning for possible metastatic spread of the prostate cancer. Please follow up with your primary care physician to discuss this further.  Secondary Diagnosis:	Hypertension, unspecified type  Assessment and plan of treatment:	You were previously diagnosed with high blood pressure. We added Hydralazine to help better control your blood pressure this admission. We believe that the dexamethasone (steroid, to prevent complication due to bleeding in your head) may have caused your blood pressure to become higher. Please continue on your home medications at this time and follow up with your PMD for further surveillance and management of your high blood pressure, as you may not need hydralazine after you stop the dexamethasone. Please continue amlodipine as prescribed.

## 2018-12-07 NOTE — PHYSICAL THERAPY INITIAL EVALUATION ADULT - ADDITIONAL COMMENTS
As per wife (spoken to over the phone), pt lives in a private house w/ 4 steps to enter with his wife and ambulates w/ a cane. Pt needs assistance with ADLs including bathing, changing and eating large meals.

## 2018-12-07 NOTE — DISCHARGE NOTE ADULT - MEDICATION SUMMARY - MEDICATIONS TO CHANGE
I will SWITCH the dose or number of times a day I take the medications listed below when I get home from the hospital:  None I will SWITCH the dose or number of times a day I take the medications listed below when I get home from the hospital:    hydrALAZINE 50 mg oral tablet  -- 1 tab(s) by mouth every 12 hours

## 2018-12-07 NOTE — PROGRESS NOTE ADULT - PROBLEM SELECTOR PLAN 6
Elects for conservative care at this time, SDH stable  - Full code Patient with known Prostate cancer on oral chemotherapy   CT thoracic and Lumbar spine with signs of possible metastatic spread   - c/w enzalutamide 4 tabs daily

## 2018-12-07 NOTE — PROGRESS NOTE ADULT - PROBLEM SELECTOR PLAN 1
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   - Repeat NCHCT Q12 for interval changes; stable   - s/p DDAVP, decadron. 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   - Repeat NCHCT, SDH stable   - s/p DDAVP, decadron. No surgical intervention at this time  - Neurosurgery following, appreciate recs in regards to resuming aspirin   - c/w keppra 1000mg BID

## 2018-12-07 NOTE — DISCHARGE NOTE ADULT - HOSPITAL COURSE
84 yo M with PMHx of dementia, HTN, orthostatic hypotension, seizures, who presents transferred from an outside hospital for further workup on a sub dural hematoma seen on imaging at OSH, after a fall at home. Patient presented A&Ox2, left sided weakness and increased tone of the RUE; Afebrile, VSS. Neurosurgery was consulted for mixed SDH on CT, conservative management, with serial CT head scans, 1dose of Keppra, ddVAP and dexamethasone. Transferred to the medicine floors monitored on telemetry, Neurology consulted, continued Keppra 1g BID, and Decadron 4 mg BID. Held midodrine and fludrocortisone, that the patient was taking for orthostatic hypotension. Orthostatics were negative during this admission. Serial HCTs with stable Sub dural hematoma. Mental status improved to baseline during the hospital course. Uncomplicated hospital course. 84 yo M with PMHx of dementia, HTN, orthostatic hypotension, seizures, who presents transferred from an outside hospital for further workup on a sub dural hematoma seen on imaging at OSH, after a fall at home. Patient presented A&Ox2, left sided weakness and increased tone of the RUE; Afebrile, VSS. Neurosurgery was consulted for mixed SDH on CT, conservative management, with serial CT head scans, 1dose of Keppra, ddVAP and dexamethasone. Transferred to the medicine floors monitored on telemetry, Neurology consulted, continued Keppra 1g BID, and Decadron 4 mg BID. Held midodrine and fludrocortisone, that the patient was taking for orthostatic hypotension. Orthostatics were positive during this admission, restarted home dose of fludrocortisone. Serial HCTs with stable Sub dural hematoma. Mental status improved to baseline during the hospital course. Uncomplicated hospital course. The patient was seen and evaluated and deemed medically stable for dischare to [facility]. 86 yo M with PMHx of dementia, HTN, orthostatic hypotension, seizures, who presents transferred from an outside hospital for further workup on a sub dural hematoma seen on imaging at OSH, after a fall at home. Patient presented A&Ox2, left sided weakness and increased tone of the RUE; Afebrile, VSS. Neurosurgery was consulted for mixed SDH on CT, conservative management, with serial CT head scans, 1dose of Keppra, ddVAP and dexamethasone. Transferred to the medicine floors monitored on telemetry, Neurology consulted, continued Keppra 1g BID, and Decadron 4 mg BID. Routine EEG negative, however keppra continued due to SDH. Held midodrine and fludrocortisone, that the patient was taking for orthostatic hypotension. Orthostatics were positive during this admission, restarted home dose of fludrocortisone. Serial HCTs with stable Sub dural hematoma. Mental status improved to baseline during the hospital course. Course complicated by syncope, orthostatics +, and improved after a 500cc bolus. The patient was seen and evaluated and deemed medically stable for discharge to [facility].

## 2018-12-07 NOTE — DISCHARGE NOTE ADULT - MEDICATION SUMMARY - MEDICATIONS TO STOP TAKING
I will STOP taking the medications listed below when I get home from the hospital:    aspirin 81 mg oral delayed release tablet  -- 1 tab(s) by mouth once a day    propranolol 10 mg oral tablet  -- 1 tab(s) by mouth 2 times a day    midodrine 10 mg oral tablet  -- 1 tab(s) by mouth 3 times a day    Keppra 750 mg oral tablet  -- 1 tab(s) by mouth 2 times a day

## 2018-12-07 NOTE — PHYSICAL THERAPY INITIAL EVALUATION ADULT - IMPAIRMENTS CONTRIBUTING TO GAIT DEVIATIONS, PT EVAL
cognition/impaired coordination/impaired motor control/abnormal muscle tone/decreased strength/impaired balance

## 2018-12-07 NOTE — PROVIDER CONTACT NOTE (OTHER) - ASSESSMENT
Pt is alert, arousable to gentle shaking, appeared calm, on 1:1 observation for safety. Pt is not following all commands, Not seen any respiratory distress. Pt is NSR on tele. No new neurological deficits seen
Pt is ao2, confused, bp is high
Pt. alert, no headache, no blurry vision. Pt asymptomatic at this time

## 2018-12-07 NOTE — DISCHARGE NOTE ADULT - CARE PROVIDER_API CALL
Cande Monroy  [address] [place] [address] Rd,   [place] [address]  Phone: [phone]  Fax: (   )    - Cande Monroy  [address],   [place] [address]  Phone: [phone]  Fax: (   )    -    Weston Chavarria), Neurological Surgery  [address] [place] [address]  Phone: [phone]  Fax: [phone]    [facility],   [address] [place] [address]  Phone: [phone]  Fax: (   )    -

## 2018-12-07 NOTE — EEG REPORT - NS EEG TEXT BOX
[facility]   COMPREHENSIVE EPILEPSY CENTER   REPORT OF ROUTINE VIDEO EEG     [facility]: [address] [place] [address] [address], [address], [place] [address], Ph#: [phone]  [facility]: [address], [place] [address], Ph#: [phone]  Office: [address] [place] [address], [facility] [address], [place] [address] Ph#: [phone]    Patient Name: YUMIKO CONTRERAS  Age and : 85y ([birth year])  MRN #: [id number]  Location: [facility] 4MON 411 W1  Referring Physician: Alysha Zapata    Study Date: 18    _____________________________________________________________  TECHNICAL INFORMATION    Placement and Labeling of Electrodes:  The EEG was performed utilizing 20 channels referential EEG connections (coronal over temporal over parasagittal montage) using all standard 10-20 electrode placements with EKG.  Recording was at a sampling rate of 256 samples per second per channel.  Time synchronized digital video recording was done simultaneously with EEG recording.  A low light infrared camera was used for low light recording.  Leoncio and seizure detection algorithms were utilized.    _____________________________________________________________  HISTORY    Patient is a 85y old  Male who presents with a chief complaint of DSH, syncopal workup (07 Dec 2018 12:40)      PERTINENT MEDICATION:  levETIRAcetam 1000 milliGRAM(s) Oral two times a day    _____________________________________________________________  STUDY INTERPRETATION    Findings: The background was continuous, spontaneously variable and reactive. During wakefulness, the posterior dominant rhythm consisted of symmetric, 7.5 Hz activity, with amplitude to 30 uV, that attenuated to eye opening.      Background Slowing:  Diffuse theta and polymorphic delta slowing.    Focal Slowing:   Continuos right hemispheric polymorphic delta and theta slowing.    Sleep Background:  Drowsiness was characterized by fragmentation, attenuation, and slowing of the background activity.    Sleep was characterized by the presence of symmetric sleep spindles and rudimentary K-complexes.    Other Non-Epileptiform Findings:  None were present.    Interictal Epileptiform Activity:   None were present.    Events:  Clinical events: None recorded.  Seizures: None recorded.    Activation Procedures:   Hyperventilation was not performed.    Photic stimulation was not performed.     Artifacts:  Intermittent myogenic and movement artifacts were noted.    ECG:  The heart rate on single channel ECG was predominantly between 60-70 BPM.    _____________________________________________________________  EEG SUMMARY/CLASSIFICATION    Normal / Abnormal EEG in the awake, drowsy and asleep states.  - Mild-moderate generalized slowing.    _____________________________________________________________  EEG IMPRESSION/CLINICAL CORRELATE  Abnormal EEG study.  1.Structural or functional abnormality in the right hemispheric region  2.Mild to moderate nonspecific diffuse or multifocal cerebral dysfunction.   3.No epileptiform pattern or seizure seen.    Vaishnavi Sales MD  Epilepsy Fellow, [facility] [place] [facility]   COMPREHENSIVE EPILEPSY CENTER   REPORT OF ROUTINE VIDEO EEG     [facility]: [address] [place] [address] Dr, [address], [place] [address], Ph#: [phone]  [facility]: [address], [place] [address], Ph#: [phone]  Office: [address] [place] [address], [facility] [address], [place] [address] Ph#: [phone]    Patient Name: YUMIKO CONTRERAS  Age and : 85y (33)  MRN #: [id number]  Location: [facility] 4MON 411 W1  Referring Physician: Alysha Zapata    Study Date: 18    _____________________________________________________________  TECHNICAL INFORMATION    Placement and Labeling of Electrodes:  The EEG was performed utilizing 20 channels referential EEG connections (coronal over temporal over parasagittal montage) using all standard 10-20 electrode placements with EKG.  Recording was at a sampling rate of 256 samples per second per channel.  Time synchronized digital video recording was done simultaneously with EEG recording.  A low light infrared camera was used for low light recording.  Leoncio and seizure detection algorithms were utilized.    _____________________________________________________________  HISTORY    Patient is a 85y old  Male who presents with a chief complaint of DSH, syncopal workup (07 Dec 2018 12:40)      PERTINENT MEDICATION:  levETIRAcetam 1000 milliGRAM(s) Oral two times a day    _____________________________________________________________  STUDY INTERPRETATION    Findings: The background was continuous, spontaneously variable and reactive. During wakefulness, the posterior dominant rhythm consisted of symmetric and well-formed 7-8 Hz activity, with amplitude to 30 uV, that attenuated to eye opening.      Background Slowing:  Diffuse theta and polymorphic delta slowing, worse over the right parasagittal region.    Focal Slowing:   Continuos right hemispheric polymorphic delta and theta slowing.    Sleep Background:  Drowsiness was characterized by fragmentation, attenuation, and slowing of the background activity.    No stage II sleep transients recorded.    Other Non-Epileptiform Findings:  Attenuation of fast over the right parasagittal region.    Interictal Epileptiform Activity:   None were present.    Events:  Clinical events: None recorded.  Seizures: None recorded.    Activation Procedures:   Hyperventilation was not performed.    Photic stimulation was not performed.     Artifacts:  Intermittent myogenic and movement artifacts were noted.    ECG:  The heart rate on single channel ECG was predominantly between 60-70 BPM.    _____________________________________________________________  EEG SUMMARY/CLASSIFICATION    Abnormal EEG in the awake, drowsy and asleep states.  - Diffuse theta and polymorphic delta slowing, worse over the right parasagittal region.  - Attenuation of fast over the right parasagittal region.  - Mild-moderate generalized slowing.    _____________________________________________________________  EEG IMPRESSION/CLINICAL CORRELATE    Abnormal EEG study.  1. 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  2. Mild to moderate nonspecific diffuse or multifocal cerebral dysfunction, worse in the right parasagittal region.   3. No epileptiform pattern or seizure seen.      Vaishnavi Sales MD  Epilepsy Fellow, [facility] [place]    Talia Watson MD  Attending Physician, [facility] [place]

## 2018-12-07 NOTE — DISCHARGE NOTE ADULT - MEDICATION SUMMARY - MEDICATIONS TO TAKE
I will START or STAY ON the medications listed below when I get home from the hospital:    dexamethasone 4 mg oral tablet  -- 1 tab(s) by mouth 2 times a day  To end 2 weeks after discharge when seen by Dr. Chavarria of neurosurgery   -- Indication: For SDH (subdural hematoma)    fludrocortisone 0.1 mg oral tablet  -- 1 tab(s) by mouth once a day  -- Indication: For Orthostatic hypotension    tamsulosin 0.4 mg oral capsule  -- 1 cap(s) by mouth once a day  -- Indication: For Prostate ca    levETIRAcetam 1000 mg oral tablet  -- 1 tab(s) by mouth 2 times a day  -- Indication: For Seizure    pravastatin 40 mg oral tablet  -- 1 tab(s) by mouth once a day (at bedtime)  -- Indication: For Hypperlipidemia     enzalutamide 40 mg oral capsule  -- 4 cap(s) by mouth once a day  -- Indication: For Prostate ca    amLODIPine 10 mg oral tablet  -- 1 tab(s) by mouth once a day  -- Indication: For Hypertension, unspecified type    Myrbetriq 50 mg oral tablet, extended release  -- 1 tab(s) by mouth once a day  -- Indication: For Prostate ca    hydrALAZINE 50 mg oral tablet  -- 1 tab(s) by mouth every 12 hours  -- Indication: For Hypertension, unspecified type I will START or STAY ON the medications listed below when I get home from the hospital:    dexamethasone 4 mg oral tablet  -- 1 tab(s) by mouth 2 times a day  To end 2 weeks after discharge when seen by Dr. Chavarria of neurosurgery   -- Indication: For SDH (subdural hematoma)    fludrocortisone 0.1 mg oral tablet  -- 1 tab(s) by mouth once a day  -- Indication: For Orthostatic hypotension    tamsulosin 0.4 mg oral capsule  -- 1 cap(s) by mouth once a day  -- Indication: For Prostate ca    levETIRAcetam 1000 mg oral tablet  -- 1 tab(s) by mouth 2 times a day  -- Indication: For Seizure    pravastatin 40 mg oral tablet  -- 1 tab(s) by mouth once a day (at bedtime)  -- Indication: For Hypperlipidemia     enzalutamide 40 mg oral capsule  -- 4 cap(s) by mouth once a day  -- Indication: For Prostate ca    amLODIPine 10 mg oral tablet  -- 1 tab(s) by mouth once a day  -- Indication: For Hypertension, unspecified type    Myrbetriq 50 mg oral tablet, extended release  -- 1 tab(s) by mouth once a day  -- Indication: For Prostate ca    hydrALAZINE 25 mg oral tablet  -- 1 tab(s) by mouth every 12 hours  -- Indication: For Hypertension, unspecified type

## 2018-12-07 NOTE — PROGRESS NOTE ADULT - SUBJECTIVE AND OBJECTIVE BOX
Seun Miles MD DARRYL  Internal Medicine PGY-1  Pager [facility]: [phone]; [facility] [id number]    Patient is a 85y old  Male who presents with a chief complaint of DSH, syncopal workup (06 Dec 2018 08:41)      SUBJECTIVE/OVERNIGHT EVENTS   No acute events overnight. Patient tolerating meals, without n/v. Reports having daily BM. Denies fevers, chills, SOB. ROS otherwise negative.     OBJECTIVE  Vital Signs Last 24 Hrs  T(C): 36.6 (07 Dec 2018 05:40), Max: 36.9 (06 Dec 2018 12:34)  T(F): 97.9 (07 Dec 2018 05:40), Max: 98.5 (06 Dec 2018 12:34)  HR: 60 (07 Dec 2018 05:40) (60 - 69)  BP: 164/71 (07 Dec 2018 05:40) (151/62 - 174/82)  BP(mean): --  RR: 18 (07 Dec 2018 05:40) (18 - 18)  SpO2: 100% (07 Dec 2018 05:40) (96% - 100%)    I&O's Summary    06 Dec 2018 07:01  -  07 Dec 2018 07:00  --------------------------------------------------------  IN: 660 mL / OUT: 50 mL / NET: 610 mL        PHYSICAL EXAM:  GENERAL: NAD, well-developed  HEAD:  Atraumatic, Normocephalic  EYES: EOMI, conjunctiva and sclera clear  NECK: Supple, No JVD  CHEST/LUNG: Clear to auscultation bilaterally; No wheeze  HEART: Regular rate and rhythm; No murmurs, rubs, or gallops  ABDOMEN: Soft, Nontender, Nondistended; Bowel sounds present  EXTREMITIES:  2+ Peripheral Pulses, No clubbing, cyanosis, or edema  PSYCH: AAOx3  NEUROLOGY: non-focal  SKIN: No rashes or lesions    LABS                        10.2   3.8   )-----------( 158      ( 06 Dec 2018 10:41 )             30.5     12-06    137  |  100  |  47<H>  ----------------------------<  115<H>  4.3   |  24  |  1.99<H>    Ca    10.1      06 Dec 2018 10:37  Phos  4.0     12-06  Mg     2.8     12-06      CAPILLARY BLOOD GLUCOSE                      Urinalysis Basic - ( 06 Dec 2018 02:27 )    Color: Yellow / Appearance: Clear / S.017 / pH: x  Gluc: x / Ketone: Negative  / Bili: Negative / Urobili: Negative mg/dL   Blood: x / Protein: Negative mg/dL / Nitrite: Negative   Leuk Esterase: Negative / RBC: x / WBC x   Sq Epi: x / Non Sq Epi: x / Bacteria: x        RADIOLOGY & ADDITIONAL TESTS:    MEDICATIONS  (STANDING):  amLODIPine   Tablet 5 milliGRAM(s) Oral daily  dexamethasone     Tablet 4 milliGRAM(s) Oral two times a day  enzalutamide 160 milliGRAM(s) Oral daily  hydrALAZINE Injectable 5 milliGRAM(s) IV Push once  levETIRAcetam 1000 milliGRAM(s) Oral two times a day  tamsulosin 0.4 milliGRAM(s) Oral at bedtime    MEDICATIONS  (PRN): Seun Miles MD DARRYL  Internal Medicine PGY-1  Pager [facility]: [phone]; [facility] [id number]    Patient is a 85y old  Male who presents with a chief complaint of DSH, syncopal workup (06 Dec 2018 08:41)      SUBJECTIVE/OVERNIGHT EVENTS   No acute events overnight. Much more alert and responsive this AM, A&Ox2. Patient tolerating meals, without n/v. Denies fevers, chills, SOB. ROS otherwise negative.     OBJECTIVE  Vital Signs Last 24 Hrs  T(C): 36.6 (07 Dec 2018 05:40), Max: 36.9 (06 Dec 2018 12:34)  T(F): 97.9 (07 Dec 2018 05:40), Max: 98.5 (06 Dec 2018 12:34)  HR: 60 (07 Dec 2018 05:40) (60 - 69)  BP: 164/71 (07 Dec 2018 05:40) (151/62 - 174/82)  BP(mean): --  RR: 18 (07 Dec 2018 05:40) (18 - 18)  SpO2: 100% (07 Dec 2018 05:40) (96% - 100%)    I&O's Summary    06 Dec 2018 07:01  -  07 Dec 2018 07:00  --------------------------------------------------------  IN: 660 mL / OUT: 50 mL / NET: 610 mL    PHYSICAL EXAM:  GENERAL: NAD  HEAD:  Atraumatic, Normocephalic  EYES: EOMI, conjunctiva and sclera clear  NECK: Supple, No JVD  CHEST/LUNG: Clear to auscultation bilaterally; No wheeze  HEART: Regular rate and rhythm; No murmurs, rubs, or gallops  ABDOMEN: Soft, Nontender, distended; Bowel sounds present  EXTREMITIES:  2+ Peripheral Pulses, No clubbing, cyanosis, or edema  PSYCH: AAOx3  NEUROLOGY: R sided hypertonicity, 3/5 strength in the LUE  SKIN: No rashes or lesions    LABS                        10.2   3.8   )-----------( 158      ( 06 Dec 2018 10:41 )             30.5     12-06    137  |  100  |  47<H>  ----------------------------<  115<H>  4.3   |  24  |  1.99<H>    Ca    10.1      06 Dec 2018 10:37  Phos  4.0     12-  Mg     2.8     12-      CAPILLARY BLOOD GLUCOSE    Urinalysis Basic - ( 06 Dec 2018 02:27 )    Color: Yellow / Appearance: Clear / S.017 / pH: x  Gluc: x / Ketone: Negative  / Bili: Negative / Urobili: Negative mg/dL   Blood: x / Protein: Negative mg/dL / Nitrite: Negative   Leuk Esterase: Negative / RBC: x / WBC x   Sq Epi: x / Non Sq Epi: x / Bacteria: x        RADIOLOGY & ADDITIONAL TESTS:    MEDICATIONS  (STANDING):  amLODIPine   Tablet 5 milliGRAM(s) Oral daily  dexamethasone     Tablet 4 milliGRAM(s) Oral two times a day  enzalutamide 160 milliGRAM(s) Oral daily  hydrALAZINE Injectable 5 milliGRAM(s) IV Push once  levETIRAcetam 1000 milliGRAM(s) Oral two times a day  tamsulosin 0.4 milliGRAM(s) Oral at bedtime    MEDICATIONS  (PRN): Seun Miles MD DARRYL  Internal Medicine PGY-1  Pager [facility]: [phone]; [facility] [id number]    Patient is a 85y old  Male who presents with a chief complaint of DSH, syncopal workup (06 Dec 2018 08:41)      SUBJECTIVE/OVERNIGHT EVENTS   No acute events overnight. Much more alert and responsive this AM, A&Ox2. Patient tolerating meals, without n/v. Denies fevers, chills, SOB. ROS otherwise negative.     OBJECTIVE  Vital Signs Last 24 Hrs  T(C): 36.6 (07 Dec 2018 05:40), Max: 36.9 (06 Dec 2018 12:34)  T(F): 97.9 (07 Dec 2018 05:40), Max: 98.5 (06 Dec 2018 12:34)  HR: 60 (07 Dec 2018 05:40) (60 - 69)  BP: 164/71 (07 Dec 2018 05:40) (151/62 - 174/82)  BP(mean): --  RR: 18 (07 Dec 2018 05:40) (18 - 18)  SpO2: 100% (07 Dec 2018 05:40) (96% - 100%)    I&O's Summary    06 Dec 2018 07:01  -  07 Dec 2018 07:00  --------------------------------------------------------  IN: 660 mL / OUT: 50 mL / NET: 610 mL    PHYSICAL EXAM:  GENERAL: NAD  HEAD:  Atraumatic, Normocephalic  EYES: EOMI, conjunctiva and sclera clear  NECK: Supple, No JVD  CHEST/LUNG: Clear to auscultation bilaterally; No wheeze  HEART: Regular rate and rhythm; No murmurs, rubs, or gallops  ABDOMEN: Soft, Nontender, distended; Bowel sounds present  EXTREMITIES:  2+ Peripheral Pulses, No clubbing, cyanosis, or edema  PSYCH: AAOx2  NEUROLOGY: R sided hypertonicity, 3/5 strength in the LUE  SKIN: No rashes or lesions    LABS: Personally Read and Interpreted                          10.3   3.8   )-----------( 148      ( 07 Dec 2018 10:03 )             30.5     Hgb Trend: 10.3<--, 10.2<--, 10.7<--      12    134<L>  |  97  |  47<H>  ----------------------------<  138<H>  4.1   |  25  |  1.59<H>    Ca    9.9      07 Dec 2018 09:16  Phos  3.3       Mg     2.6         TPro  6.9  /  Alb  4.0  /  TBili  1.0  /  DBili  x   /  AST  21  /  ALT  8<L>  /  AlkPhos  92      Creatinine Trend: 1.59<--, 1.99<--, 1.54<--    Urinalysis Basic - ( 06 Dec 2018 02:27 )    Color: Yellow / Appearance: Clear / S.017 / pH: x  Gluc: x / Ketone: Negative  / Bili: Negative / Urobili: Negative mg/dL   Blood: x / Protein: Negative mg/dL / Nitrite: Negative   Leuk Esterase: Negative / RBC: x / WBC x   Sq Epi: x / Non Sq Epi: x / Bacteria: x    RADIOLOGY & ADDITIONAL TESTS:  < from: CT Head No Cont (18 @ 16:58) >  IMPRESSION:    Similar-appearing right frontal mixed density subdural collection   measuring up to 2.5 cm in greatest depth. Underlying sulcal effacement   and minimal leftward midline shiftis similar. No hydrocephalus.      < end of copied text >    MEDICATIONS  (STANDING):  amLODIPine   Tablet 5 milliGRAM(s) Oral daily  dexamethasone     Tablet 4 milliGRAM(s) Oral two times a day  enzalutamide 160 milliGRAM(s) Oral daily  hydrALAZINE Injectable 5 milliGRAM(s) IV Push once  levETIRAcetam 1000 milliGRAM(s) Oral two times a day  tamsulosin 0.4 milliGRAM(s) Oral at bedtime    MEDICATIONS  (PRN):

## 2018-12-07 NOTE — DISCHARGE NOTE ADULT - CARE PROVIDERS DIRECT ADDRESSES
,DirectAddress_Unknown ,DirectAddress_Unknown,[email or web address],DirectAddress_Unknown

## 2018-12-07 NOTE — PROGRESS NOTE ADULT - PROBLEM SELECTOR PLAN 7
DVT: ICD given SDH  DIet: Passed bedside speech and swallow, mechanical soft diet  Dispo: Pending PT eval Elects for conservative care at this time, SDH stable  - Full code

## 2018-12-08 LAB
ANION GAP SERPL CALC-SCNC: 13 MMOL/L — SIGNIFICANT CHANGE UP (ref 5–17)
BUN SERPL-MCNC: 46 MG/DL — HIGH (ref 7–23)
CALCIUM SERPL-MCNC: 9.9 MG/DL — SIGNIFICANT CHANGE UP (ref 8.4–10.5)
CHLORIDE SERPL-SCNC: 96 MMOL/L — SIGNIFICANT CHANGE UP (ref 96–108)
CO2 SERPL-SCNC: 23 MMOL/L — SIGNIFICANT CHANGE UP (ref 22–31)
CREAT SERPL-MCNC: 1.54 MG/DL — HIGH (ref 0.5–1.3)
GLUCOSE SERPL-MCNC: 107 MG/DL — HIGH (ref 70–99)
HCT VFR BLD CALC: 29.5 % — LOW (ref 39–50)
HGB BLD-MCNC: 9.9 G/DL — LOW (ref 13–17)
MAGNESIUM SERPL-MCNC: 2.5 MG/DL — SIGNIFICANT CHANGE UP (ref 1.6–2.6)
MCHC RBC-ENTMCNC: 31.3 PG — SIGNIFICANT CHANGE UP (ref 27–34)
MCHC RBC-ENTMCNC: 33.6 GM/DL — SIGNIFICANT CHANGE UP (ref 32–36)
MCV RBC AUTO: 93.4 FL — SIGNIFICANT CHANGE UP (ref 80–100)
PHOSPHATE SERPL-MCNC: 3.3 MG/DL — SIGNIFICANT CHANGE UP (ref 2.5–4.5)
PLATELET # BLD AUTO: 152 K/UL — SIGNIFICANT CHANGE UP (ref 150–400)
POTASSIUM SERPL-MCNC: 4.7 MMOL/L — SIGNIFICANT CHANGE UP (ref 3.5–5.3)
POTASSIUM SERPL-SCNC: 4.7 MMOL/L — SIGNIFICANT CHANGE UP (ref 3.5–5.3)
RBC # BLD: 3.16 M/UL — LOW (ref 4.2–5.8)
RBC # FLD: 13.5 % — SIGNIFICANT CHANGE UP (ref 10.3–14.5)
SODIUM SERPL-SCNC: 132 MMOL/L — LOW (ref 135–145)
WBC # BLD: 3.96 K/UL — SIGNIFICANT CHANGE UP (ref 3.8–10.5)
WBC # FLD AUTO: 3.96 K/UL — SIGNIFICANT CHANGE UP (ref 3.8–10.5)

## 2018-12-08 PROCEDURE — 99233 SBSQ HOSP IP/OBS HIGH 50: CPT

## 2018-12-08 RX ADMIN — FLUDROCORTISONE ACETATE 0.1 MILLIGRAM(S): 0.1 TABLET ORAL at 12:34

## 2018-12-08 RX ADMIN — ATORVASTATIN CALCIUM 10 MILLIGRAM(S): 80 TABLET, FILM COATED ORAL at 21:57

## 2018-12-08 RX ADMIN — ENZALUTAMIDE 160 MILLIGRAM(S): 80 TABLET ORAL at 12:36

## 2018-12-08 RX ADMIN — LEVETIRACETAM 1000 MILLIGRAM(S): 250 TABLET, FILM COATED ORAL at 06:44

## 2018-12-08 RX ADMIN — TAMSULOSIN HYDROCHLORIDE 0.4 MILLIGRAM(S): 0.4 CAPSULE ORAL at 21:57

## 2018-12-08 RX ADMIN — LEVETIRACETAM 1000 MILLIGRAM(S): 250 TABLET, FILM COATED ORAL at 17:18

## 2018-12-08 RX ADMIN — Medication 4 MILLIGRAM(S): at 17:18

## 2018-12-08 RX ADMIN — AMLODIPINE BESYLATE 10 MILLIGRAM(S): 2.5 TABLET ORAL at 06:47

## 2018-12-08 RX ADMIN — Medication 4 MILLIGRAM(S): at 06:44

## 2018-12-08 NOTE — PROGRESS NOTE ADULT - PROBLEM SELECTOR PLAN 4
Reports being diagnosed by doctors at a OSH with seizures and has been on Keppra 750 mg BID daily. However patient has never followed up with a neurologist.  - Neurology consulted appreciate tess  - Jessica  - c/w Keppra 1000mg BID Reports being diagnosed by doctors at a OSH with seizures and has been on Keppra 750 mg BID daily. However patient has never followed up with a neurologist.  - Neurology consulted appreciate recs  - rEEG showed no epileptiform activity, shows cortical injury in R parasagittal region  - c/w Keppra 1000mg BID

## 2018-12-08 NOTE — PROGRESS NOTE ADULT - PROBLEM SELECTOR PLAN 3
Reported history of previously diagnosed orthostatic hypotension  - consider starting fludrocortisone .1mg daily after EEG  - holding midodrine 10mg TID (home med)

## 2018-12-08 NOTE — PROGRESS NOTE ADULT - PROBLEM SELECTOR PLAN 1
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   - Repeat NCHCT, SDH stable   - s/p DDAVP, decadron. No surgical intervention at this time  - Neurosurgery following, appreciate recs in regards to resuming aspirin   - c/w keppra 1000mg BID Patient with mixed acute and subactue w/ some midline shift SDH on NCHCT, family opt for conservative management.   - Repeat CTH on 12/6 stable SDH, SDH stable   - s/p DDAVP on 12/5, c/w PO decadron 4mg BID.   -No surgical intervention at this time  - Neurosurgery following, appreciate recs in regards to resuming aspirin   - c/w keppra 1000mg BID

## 2018-12-08 NOTE — PROGRESS NOTE ADULT - SUBJECTIVE AND OBJECTIVE BOX
Renard Hoang  PGY2 Internal Medicine  Pager [phone] or [phone]    Patient is a 85y old  Male who presents with a chief complaint of DSH, syncopal workup (07 Dec 2018 12:40)      SUBJECTIVE / OVERNIGHT EVENTS:  1:1 changed to enhanced supervision, no ON events, f/n/v/d/CP/SOB    MEDICATIONS  (STANDING):  amLODIPine   Tablet 10 milliGRAM(s) Oral daily  atorvastatin 10 milliGRAM(s) Oral at bedtime  dexamethasone     Tablet 4 milliGRAM(s) Oral two times a day  enzalutamide 160 milliGRAM(s) Oral daily  fludroCORTISONE 0.1 milliGRAM(s) Oral daily  levETIRAcetam 1000 milliGRAM(s) Oral two times a day  tamsulosin 0.4 milliGRAM(s) Oral at bedtime    MEDICATIONS  (PRN):      T(C): 36.9 (12-08-18 @ 04:54), Max: 37.1 (12-07-18 @ 11:27)  HR: 59 (12-08-18 @ 04:54) (59 - 69)  BP: 168/83 (12-08-18 @ 04:54) (130/61 - 188/82)  RR: 20 (12-08-18 @ 04:54) (16 - 20)  SpO2: 100% (12-08-18 @ 04:54) (99% - 100%)  CAPILLARY BLOOD GLUCOSE        I&O's Summary    06 Dec 2018 07:01  -  07 Dec 2018 07:00  --------------------------------------------------------  IN: 660 mL / OUT: 50 mL / NET: 610 mL    07 Dec 2018 07:01  -  08 Dec 2018 06:12  --------------------------------------------------------  IN: 1440 mL / OUT: 200 mL / NET: 1240 mL    PHYSICAL EXAM:  GENERAL: NAD  HEAD:  Atraumatic, Normocephalic  EYES: EOMI, conjunctiva and sclera clear  NECK: Supple, No JVD  CHEST/LUNG: Clear to auscultation bilaterally; No wheeze  HEART: Regular rate and rhythm; No murmurs, rubs, or gallops  ABDOMEN: Soft, Nontender, distended; Bowel sounds present  EXTREMITIES:  2+ Peripheral Pulses, No clubbing, cyanosis, or edema  PSYCH: AAOx2  NEUROLOGY: R sided hypertonicity, 3/5 strength in the LUE  SKIN: No rashes or lesions    LABS:                        10.3   3.8   )-----------( 148      ( 07 Dec 2018 10:03 )             30.5     12-07    134<L>  |  97  |  47<H>  ----------------------------<  138<H>  4.1   |  25  |  1.59<H>    Ca    9.9      07 Dec 2018 09:16  Phos  3.3     12-07  Mg     2.6     12-07    TPro  6.9  /  Alb  4.0  /  TBili  1.0  /  DBili  x   /  AST  21  /  ALT  8<L>  /  AlkPhos  92  12-07              Micro:      RADIOLOGY & ADDITIONAL TESTS:    Imaging Personally Reviewed:    Consultant(s) Notes Reviewed:      Care Discussed with Consultants/Other Providers:

## 2018-12-08 NOTE — PROGRESS NOTE ADULT - PROBLEM SELECTOR PLAN 2
Presentation syncope vs CVA vs seizure   Patient with reported history of syncopal episodes every other day for the last four years. Also with history of orthostatic hypotension on fludrocortisone. Also with reported   - restart fludrocortisone .1mg   - continue to hold midodrine 10mg TID  - monitor on tele

## 2018-12-08 NOTE — PROGRESS NOTE ADULT - ASSESSMENT
85 year old Male PMHx of dementia, HTN, orthostatic hypotension, seizures, who presents transferred from an outside hospital for further workup on a sub dural hematoma seen on imaging at OSH, after a fall at home. DDx for this presentation includes syncope vs. arrythmia vs seizures; SDH stable mental status improving 85 year old Male PMHx of dementia, HTN, orthostatic hypotension, seizures, who presents transferred from an outside hospital for further workup on a sub dural hematoma seen on imaging at OSH, after a fall at home. DDx for this presentation includes syncope vs. arrythmia vs seizures; SDH stable mental status improving and awaiting RICKY

## 2018-12-08 NOTE — PROGRESS NOTE ADULT - PROBLEM SELECTOR PLAN 8
DVT: ICD given SDH  DIet: Passed bedside speech and swallow, mechanical soft diet  Dispo: Pending PT eval DVT: ICD given SDH  DIet: Passed bedside speech and swallow, mechanical soft diet  Dispo: RICKY Hoang  PGY2 Internal Medicine  Pager [phone] or [phone]

## 2018-12-09 LAB
ANION GAP SERPL CALC-SCNC: 11 MMOL/L — SIGNIFICANT CHANGE UP (ref 5–17)
BUN SERPL-MCNC: 35 MG/DL — HIGH (ref 7–23)
CALCIUM SERPL-MCNC: 10.2 MG/DL — SIGNIFICANT CHANGE UP (ref 8.4–10.5)
CHLORIDE SERPL-SCNC: 97 MMOL/L — SIGNIFICANT CHANGE UP (ref 96–108)
CO2 SERPL-SCNC: 26 MMOL/L — SIGNIFICANT CHANGE UP (ref 22–31)
CREAT SERPL-MCNC: 1.37 MG/DL — HIGH (ref 0.5–1.3)
GLUCOSE SERPL-MCNC: 105 MG/DL — HIGH (ref 70–99)
HCT VFR BLD CALC: 33.4 % — LOW (ref 39–50)
HGB BLD-MCNC: 10.9 G/DL — LOW (ref 13–17)
MAGNESIUM SERPL-MCNC: 2.6 MG/DL — SIGNIFICANT CHANGE UP (ref 1.6–2.6)
MCHC RBC-ENTMCNC: 31.6 PG — SIGNIFICANT CHANGE UP (ref 27–34)
MCHC RBC-ENTMCNC: 32.6 GM/DL — SIGNIFICANT CHANGE UP (ref 32–36)
MCV RBC AUTO: 96.8 FL — SIGNIFICANT CHANGE UP (ref 80–100)
PHOSPHATE SERPL-MCNC: 2.8 MG/DL — SIGNIFICANT CHANGE UP (ref 2.5–4.5)
PLATELET # BLD AUTO: 170 K/UL — SIGNIFICANT CHANGE UP (ref 150–400)
POTASSIUM SERPL-MCNC: 4.5 MMOL/L — SIGNIFICANT CHANGE UP (ref 3.5–5.3)
POTASSIUM SERPL-SCNC: 4.5 MMOL/L — SIGNIFICANT CHANGE UP (ref 3.5–5.3)
RBC # BLD: 3.45 M/UL — LOW (ref 4.2–5.8)
RBC # FLD: 13.5 % — SIGNIFICANT CHANGE UP (ref 10.3–14.5)
SODIUM SERPL-SCNC: 134 MMOL/L — LOW (ref 135–145)
WBC # BLD: 3.44 K/UL — LOW (ref 3.8–10.5)
WBC # FLD AUTO: 3.44 K/UL — LOW (ref 3.8–10.5)

## 2018-12-09 PROCEDURE — 99233 SBSQ HOSP IP/OBS HIGH 50: CPT

## 2018-12-09 RX ORDER — HYDRALAZINE HCL 50 MG
25 TABLET ORAL EVERY 12 HOURS
Qty: 0 | Refills: 0 | Status: DISCONTINUED | OUTPATIENT
Start: 2018-12-09 | End: 2018-12-10

## 2018-12-09 RX ADMIN — LEVETIRACETAM 1000 MILLIGRAM(S): 250 TABLET, FILM COATED ORAL at 17:10

## 2018-12-09 RX ADMIN — ATORVASTATIN CALCIUM 10 MILLIGRAM(S): 80 TABLET, FILM COATED ORAL at 22:01

## 2018-12-09 RX ADMIN — AMLODIPINE BESYLATE 10 MILLIGRAM(S): 2.5 TABLET ORAL at 05:43

## 2018-12-09 RX ADMIN — Medication 4 MILLIGRAM(S): at 17:10

## 2018-12-09 RX ADMIN — LEVETIRACETAM 1000 MILLIGRAM(S): 250 TABLET, FILM COATED ORAL at 05:43

## 2018-12-09 RX ADMIN — Medication 25 MILLIGRAM(S): at 11:46

## 2018-12-09 RX ADMIN — ENZALUTAMIDE 160 MILLIGRAM(S): 80 TABLET ORAL at 11:43

## 2018-12-09 RX ADMIN — Medication 25 MILLIGRAM(S): at 22:01

## 2018-12-09 RX ADMIN — Medication 4 MILLIGRAM(S): at 05:43

## 2018-12-09 RX ADMIN — TAMSULOSIN HYDROCHLORIDE 0.4 MILLIGRAM(S): 0.4 CAPSULE ORAL at 22:01

## 2018-12-09 RX ADMIN — FLUDROCORTISONE ACETATE 0.1 MILLIGRAM(S): 0.1 TABLET ORAL at 05:43

## 2018-12-09 NOTE — PROGRESS NOTE ADULT - PROBLEM SELECTOR PLAN 3
Reported history of previously diagnosed orthostatic hypotension  - c/w fludrocortisone .1mg daily  - holding midodrine 10mg TID (home med)

## 2018-12-09 NOTE — PROGRESS NOTE ADULT - PROBLEM SELECTOR PLAN 5
-c/w home dose amlodipine 10 -c/w home dose amlodipine 10  - hydralazine 25 BID added for BP control

## 2018-12-09 NOTE — PROGRESS NOTE ADULT - PROBLEM SELECTOR PLAN 4
Reports being diagnosed by doctors at a OSH with seizures and has been on Keppra 750 mg BID daily. However patient has never followed up with a neurologist.  - Neurology consulted appreciate recs  - rEEG showed no epileptiform activity, shows cortical injury in R parasagittal region  - c/w Keppra 1000mg BID

## 2018-12-09 NOTE — PROGRESS NOTE ADULT - PROBLEM SELECTOR PLAN 8
DVT: ICD given SDH  DIet: Passed bedside speech and swallow, mechanical soft diet  Dispo: RICKY Hoang  PGY2 Internal Medicine  Pager [phone] or [phone] DVT: ICD given SDH  DIet: Passed bedside speech and swallow, mechanical soft diet  Dispo: RICKY

## 2018-12-09 NOTE — PROGRESS NOTE ADULT - SUBJECTIVE AND OBJECTIVE BOX
Seun Miles MD DARRYL  Internal Medicine PGY-1  Pager [facility]: [phone]; [facility] [id number]    Patient is a 85y old  Male who presents with a chief complaint of DSH, syncopal workup (08 Dec 2018 06:12)      SUBJECTIVE/OVERNIGHT EVENTS   No acute events overnight. No active complaints, mental status improving A&Ox2, more responsive.     OBJECTIVE  Vital Signs Last 24 Hrs  T(C): 36.6 (09 Dec 2018 04:57), Max: 37.2 (08 Dec 2018 20:15)  T(F): 97.9 (09 Dec 2018 04:57), Max: 98.9 (08 Dec 2018 20:15)  HR: 60 (09 Dec 2018 04:57) (54 - 60)  BP: 175/86 (09 Dec 2018 04:57) (165/70 - 183/83)  BP(mean): --  RR: 18 (09 Dec 2018 04:57) (18 - 19)  SpO2: 100% (09 Dec 2018 04:57) (98% - 100%)    I&O's Summary    08 Dec 2018 07:01  -  09 Dec 2018 07:00  --------------------------------------------------------  IN: 820 mL / OUT: 0 mL / NET: 820 mL    09 Dec 2018 07:01  -  09 Dec 2018 09:23  --------------------------------------------------------  IN: 300 mL / OUT: 0 mL / NET: 300 mL        PHYSICAL EXAM:  GENERAL: NAD  HEAD:  Atraumatic, Normocephalic  EYES: EOMI, conjunctiva and sclera clear  NECK: Supple, No JVD  CHEST/LUNG: Clear to auscultation bilaterally; No wheeze  HEART: Regular rate and rhythm; No murmurs, rubs, or gallops  ABDOMEN: Soft, Nontender, Nondistended; Bowel sounds present  EXTREMITIES:  2+ Peripheral Pulses, No clubbing, cyanosis, or edema  PSYCH: AAOx2  NEUROLOGY: R sided hypertonicity, 3/5 strength in the LUE  SKIN: No rashes or lesions    LABS                        9.9    3.96  )-----------( 152      ( 08 Dec 2018 09:01 )             29.5                         10.3   3.8   )-----------( 148      ( 07 Dec 2018 10:03 )             30.5     12-08    132<L>  |  96  |  46<H>  ----------------------------<  107<H>  4.7   |  23  |  1.54<H>    Ca    9.9      08 Dec 2018 07:15  Phos  3.3     12-08  Mg     2.5     12-08    MEDICATIONS  (STANDING):  amLODIPine   Tablet 10 milliGRAM(s) Oral daily  atorvastatin 10 milliGRAM(s) Oral at bedtime  dexamethasone     Tablet 4 milliGRAM(s) Oral two times a day  enzalutamide 160 milliGRAM(s) Oral daily  fludroCORTISONE 0.1 milliGRAM(s) Oral daily  levETIRAcetam 1000 milliGRAM(s) Oral two times a day  tamsulosin 0.4 milliGRAM(s) Oral at bedtime    MEDICATIONS  (PRN):

## 2018-12-09 NOTE — PROGRESS NOTE ADULT - ASSESSMENT
85 year old Male PMHx of dementia, HTN, orthostatic hypotension, seizures, who presents transferred from an outside hospital for further workup on a sub dural hematoma seen on imaging at OSH, after a fall at home. DDx for this presentation includes syncope vs seizures; SDH stable mental status improving and awaiting RICKY

## 2018-12-09 NOTE — PROGRESS NOTE ADULT - PROBLEM SELECTOR PLAN 1
Patient with mixed acute and subactue w/ some midline shift SDH on NCHCT, family opt for conservative management.   - Repeat CTH on 12/6 stable SDH, SDH stable   - s/p DDAVP on 12/5, c/w PO decadron 4mg BID.   - No surgical intervention at this time  - Neurosurgery following, will hold off on aspirin until seen as outpatient  - c/w keppra 1000mg BID

## 2018-12-09 NOTE — PROGRESS NOTE ADULT - PROBLEM SELECTOR PLAN 2
Presentation syncope vs seizure   Patient with reported history of syncopal episodes every other day for the last four years. Also with history of orthostatic hypotension on fludrocortisone.  - c/w fludrocortisone .1mg in the setting of +orthostatics   - continue to hold midodrine 10mg TID  - monitor on tele

## 2018-12-10 LAB
ANION GAP SERPL CALC-SCNC: 12 MMOL/L — SIGNIFICANT CHANGE UP (ref 5–17)
BUN SERPL-MCNC: 36 MG/DL — HIGH (ref 7–23)
CALCIUM SERPL-MCNC: 10 MG/DL — SIGNIFICANT CHANGE UP (ref 8.4–10.5)
CHLORIDE SERPL-SCNC: 99 MMOL/L — SIGNIFICANT CHANGE UP (ref 96–108)
CO2 SERPL-SCNC: 24 MMOL/L — SIGNIFICANT CHANGE UP (ref 22–31)
CREAT SERPL-MCNC: 1.37 MG/DL — HIGH (ref 0.5–1.3)
GLUCOSE SERPL-MCNC: 105 MG/DL — HIGH (ref 70–99)
HCT VFR BLD CALC: 30.4 % — LOW (ref 39–50)
HGB BLD-MCNC: 10 G/DL — LOW (ref 13–17)
MAGNESIUM SERPL-MCNC: 2.6 MG/DL — SIGNIFICANT CHANGE UP (ref 1.6–2.6)
MCHC RBC-ENTMCNC: 31.3 PG — SIGNIFICANT CHANGE UP (ref 27–34)
MCHC RBC-ENTMCNC: 32.9 GM/DL — SIGNIFICANT CHANGE UP (ref 32–36)
MCV RBC AUTO: 95.3 FL — SIGNIFICANT CHANGE UP (ref 80–100)
PHOSPHATE SERPL-MCNC: 2.4 MG/DL — LOW (ref 2.5–4.5)
PLATELET # BLD AUTO: 174 K/UL — SIGNIFICANT CHANGE UP (ref 150–400)
POTASSIUM SERPL-MCNC: 4.5 MMOL/L — SIGNIFICANT CHANGE UP (ref 3.5–5.3)
POTASSIUM SERPL-SCNC: 4.5 MMOL/L — SIGNIFICANT CHANGE UP (ref 3.5–5.3)
RBC # BLD: 3.19 M/UL — LOW (ref 4.2–5.8)
RBC # FLD: 13.7 % — SIGNIFICANT CHANGE UP (ref 10.3–14.5)
SODIUM SERPL-SCNC: 135 MMOL/L — SIGNIFICANT CHANGE UP (ref 135–145)
WBC # BLD: 3.39 K/UL — LOW (ref 3.8–10.5)
WBC # FLD AUTO: 3.39 K/UL — LOW (ref 3.8–10.5)

## 2018-12-10 PROCEDURE — 99233 SBSQ HOSP IP/OBS HIGH 50: CPT | Mod: GC

## 2018-12-10 RX ORDER — POTASSIUM PHOSPHATE, MONOBASIC POTASSIUM PHOSPHATE, DIBASIC 236; 224 MG/ML; MG/ML
15 INJECTION, SOLUTION INTRAVENOUS ONCE
Qty: 0 | Refills: 0 | Status: COMPLETED | OUTPATIENT
Start: 2018-12-10 | End: 2018-12-10

## 2018-12-10 RX ORDER — HYDRALAZINE HCL 50 MG
50 TABLET ORAL EVERY 12 HOURS
Qty: 0 | Refills: 0 | Status: DISCONTINUED | OUTPATIENT
Start: 2018-12-10 | End: 2018-12-10

## 2018-12-10 RX ORDER — HYDRALAZINE HCL 50 MG
25 TABLET ORAL ONCE
Qty: 0 | Refills: 0 | Status: COMPLETED | OUTPATIENT
Start: 2018-12-10 | End: 2018-12-10

## 2018-12-10 RX ORDER — HYDRALAZINE HCL 50 MG
25 TABLET ORAL EVERY 12 HOURS
Qty: 0 | Refills: 0 | Status: DISCONTINUED | OUTPATIENT
Start: 2018-12-10 | End: 2018-12-11

## 2018-12-10 RX ADMIN — Medication 25 MILLIGRAM(S): at 08:12

## 2018-12-10 RX ADMIN — LEVETIRACETAM 1000 MILLIGRAM(S): 250 TABLET, FILM COATED ORAL at 06:02

## 2018-12-10 RX ADMIN — AMLODIPINE BESYLATE 10 MILLIGRAM(S): 2.5 TABLET ORAL at 06:02

## 2018-12-10 RX ADMIN — Medication 4 MILLIGRAM(S): at 06:02

## 2018-12-10 RX ADMIN — ATORVASTATIN CALCIUM 10 MILLIGRAM(S): 80 TABLET, FILM COATED ORAL at 21:50

## 2018-12-10 RX ADMIN — Medication 25 MILLIGRAM(S): at 06:02

## 2018-12-10 RX ADMIN — ENZALUTAMIDE 160 MILLIGRAM(S): 80 TABLET ORAL at 13:37

## 2018-12-10 RX ADMIN — TAMSULOSIN HYDROCHLORIDE 0.4 MILLIGRAM(S): 0.4 CAPSULE ORAL at 21:50

## 2018-12-10 RX ADMIN — POTASSIUM PHOSPHATE, MONOBASIC POTASSIUM PHOSPHATE, DIBASIC 62.5 MILLIMOLE(S): 236; 224 INJECTION, SOLUTION INTRAVENOUS at 13:35

## 2018-12-10 RX ADMIN — Medication 4 MILLIGRAM(S): at 17:20

## 2018-12-10 RX ADMIN — Medication 25 MILLIGRAM(S): at 17:20

## 2018-12-10 RX ADMIN — LEVETIRACETAM 1000 MILLIGRAM(S): 250 TABLET, FILM COATED ORAL at 17:20

## 2018-12-10 RX ADMIN — FLUDROCORTISONE ACETATE 0.1 MILLIGRAM(S): 0.1 TABLET ORAL at 06:02

## 2018-12-10 NOTE — PROGRESS NOTE ADULT - PROBLEM SELECTOR PLAN 1
Patient with mixed acute and subactue w/ some midline shift SDH on NCHCT, family opt for conservative management.   - Repeat CTH on 12/6 stable SDH, SDH stable   - s/p DDAVP on 12/5, c/w PO decadron 4mg BID.   - No surgical intervention at this time  - Neurosurgery following, will hold off on aspirin until seen as outpatient  - c/w keppra 1000mg BID Patient with mixed acute and subactue w/ some midline shift SDH on NCHCT, family opt for conservative management.   - Repeat CTH on 12/6 stable SDH, SDH stable   - s/p DDAVP on 12/5, c/w PO decadron 4mg BID.   - will follow up with Neurosurg in regard to length of decadron use   - No surgical intervention at this time  - Neurosurgery following, will hold off on aspirin until seen as outpatient  - c/w keppra 1000mg BID

## 2018-12-10 NOTE — PROGRESS NOTE ADULT - PROBLEM SELECTOR PLAN 5
-c/w home dose amlodipine 10  - hydralazine 25 BID added for BP control -c/w home dose amlodipine 10  - increase hydralazine to 50 BID

## 2018-12-10 NOTE — PROGRESS NOTE ADULT - SUBJECTIVE AND OBJECTIVE BOX
Seun Miles MD DARRYL  Internal Medicine PGY-1  Pager [facility]: [phone]; [facility] [id number]    Patient is a 85y old  Male who presents with a chief complaint of DSH, syncopal workup (10 Dec 2018 08:21)      SUBJECTIVE/OVERNIGHT EVENTS   No acute events overnight. No acute complaints, mental status improving. Denies fevers, chills, SOB. ROS otherwise negative.     OBJECTIVE  Vital Signs Last 24 Hrs  T(C): 36.7 (10 Dec 2018 05:00), Max: 36.7 (09 Dec 2018 20:27)  T(F): 98 (10 Dec 2018 05:00), Max: 98.1 (09 Dec 2018 20:27)  HR: 60 (10 Dec 2018 08:08) (60 - 60)  BP: 158/74 (10 Dec 2018 08:08) (158/74 - 199/83)  BP(mean): --  RR: 19 (10 Dec 2018 05:00) (18 - 19)  SpO2: 100% (10 Dec 2018 05:00) (99% - 100%)    I&O's Summary    09 Dec 2018 07:01  -  10 Dec 2018 07:00  --------------------------------------------------------  IN: 1260 mL / OUT: 400 mL / NET: 860 mL    10 Dec 2018 07:01  -  10 Dec 2018 10:13  --------------------------------------------------------  IN: 360 mL / OUT: 0 mL / NET: 360 mL    PHYSICAL EXAM:  GENERAL: NAD  HEAD:  Atraumatic, Normocephalic  EYES: EOMI, conjunctiva and sclera clear  NECK: Supple, No JVD  CHEST/LUNG: Clear to auscultation bilaterally; No wheeze  HEART: Regular rate and rhythm; No murmurs, rubs, or gallops  ABDOMEN: Soft, Nontender, Nondistended; Bowel sounds present  EXTREMITIES:  2+ Peripheral Pulses, No clubbing, cyanosis, or edema  PSYCH: AAOx2  NEUROLOGY: R sided hypertonicity, 3/5 strength in the LUE  SKIN: No rashes or lesions    LABS                        10.9   3.44  )-----------( 170      ( 09 Dec 2018 12:07 )             33.4     12-09    134<L>  |  97  |  35<H>  ----------------------------<  105<H>  4.5   |  26  |  1.37<H>    Ca    10.2      09 Dec 2018 10:48  Phos  2.8     12-09  Mg     2.6     12-09    MEDICATIONS  (STANDING):  amLODIPine   Tablet 10 milliGRAM(s) Oral daily  atorvastatin 10 milliGRAM(s) Oral at bedtime  dexamethasone     Tablet 4 milliGRAM(s) Oral two times a day  enzalutamide 160 milliGRAM(s) Oral daily  fludroCORTISONE 0.1 milliGRAM(s) Oral daily  hydrALAZINE 50 milliGRAM(s) Oral every 12 hours  levETIRAcetam 1000 milliGRAM(s) Oral two times a day  tamsulosin 0.4 milliGRAM(s) Oral at bedtime    MEDICATIONS  (PRN):

## 2018-12-11 PROCEDURE — 99233 SBSQ HOSP IP/OBS HIGH 50: CPT | Mod: GC

## 2018-12-11 RX ORDER — HYDRALAZINE HCL 50 MG
1 TABLET ORAL
Qty: 0 | Refills: 0 | COMMUNITY
Start: 2018-12-11

## 2018-12-11 RX ORDER — HYDRALAZINE HCL 50 MG
25 TABLET ORAL EVERY 12 HOURS
Qty: 0 | Refills: 0 | Status: DISCONTINUED | OUTPATIENT
Start: 2018-12-11 | End: 2018-12-13

## 2018-12-11 RX ORDER — LEVETIRACETAM 250 MG/1
1 TABLET, FILM COATED ORAL
Qty: 0 | Refills: 0 | COMMUNITY
Start: 2018-12-11

## 2018-12-11 RX ORDER — DEXAMETHASONE 0.5 MG/5ML
1 ELIXIR ORAL
Qty: 0 | Refills: 0 | DISCHARGE
Start: 2018-12-11

## 2018-12-11 RX ORDER — HYDRALAZINE HCL 50 MG
25 TABLET ORAL EVERY 12 HOURS
Qty: 0 | Refills: 0 | Status: DISCONTINUED | OUTPATIENT
Start: 2018-12-11 | End: 2018-12-11

## 2018-12-11 RX ORDER — HYDRALAZINE HCL 50 MG
1 TABLET ORAL
Qty: 0 | Refills: 0 | DISCHARGE
Start: 2018-12-11

## 2018-12-11 RX ORDER — LEVETIRACETAM 250 MG/1
1 TABLET, FILM COATED ORAL
Qty: 0 | Refills: 0 | DISCHARGE
Start: 2018-12-11

## 2018-12-11 RX ORDER — HYDRALAZINE HCL 50 MG
50 TABLET ORAL EVERY 12 HOURS
Qty: 0 | Refills: 0 | Status: DISCONTINUED | OUTPATIENT
Start: 2018-12-11 | End: 2018-12-11

## 2018-12-11 RX ORDER — FLUDROCORTISONE ACETATE 0.1 MG/1
1 TABLET ORAL
Qty: 0 | Refills: 0 | DISCHARGE
Start: 2018-12-11

## 2018-12-11 RX ORDER — SODIUM CHLORIDE 9 MG/ML
250 INJECTION INTRAMUSCULAR; INTRAVENOUS; SUBCUTANEOUS ONCE
Qty: 0 | Refills: 0 | Status: COMPLETED | OUTPATIENT
Start: 2018-12-11 | End: 2018-12-11

## 2018-12-11 RX ADMIN — Medication 25 MILLIGRAM(S): at 17:50

## 2018-12-11 RX ADMIN — ATORVASTATIN CALCIUM 10 MILLIGRAM(S): 80 TABLET, FILM COATED ORAL at 21:16

## 2018-12-11 RX ADMIN — FLUDROCORTISONE ACETATE 0.1 MILLIGRAM(S): 0.1 TABLET ORAL at 05:55

## 2018-12-11 RX ADMIN — SODIUM CHLORIDE 125 MILLILITER(S): 9 INJECTION INTRAMUSCULAR; INTRAVENOUS; SUBCUTANEOUS at 15:30

## 2018-12-11 RX ADMIN — TAMSULOSIN HYDROCHLORIDE 0.4 MILLIGRAM(S): 0.4 CAPSULE ORAL at 21:16

## 2018-12-11 RX ADMIN — AMLODIPINE BESYLATE 10 MILLIGRAM(S): 2.5 TABLET ORAL at 05:56

## 2018-12-11 RX ADMIN — Medication 25 MILLIGRAM(S): at 05:56

## 2018-12-11 RX ADMIN — ENZALUTAMIDE 160 MILLIGRAM(S): 80 TABLET ORAL at 11:07

## 2018-12-11 RX ADMIN — Medication 4 MILLIGRAM(S): at 05:56

## 2018-12-11 RX ADMIN — LEVETIRACETAM 1000 MILLIGRAM(S): 250 TABLET, FILM COATED ORAL at 05:56

## 2018-12-11 RX ADMIN — LEVETIRACETAM 1000 MILLIGRAM(S): 250 TABLET, FILM COATED ORAL at 17:50

## 2018-12-11 RX ADMIN — Medication 4 MILLIGRAM(S): at 17:50

## 2018-12-11 NOTE — PROGRESS NOTE ADULT - SUBJECTIVE AND OBJECTIVE BOX
Seun Miles MD DARRYL  Internal Medicine PGY-1  Pager [facility]: [phone]; [facility] [id number]    Patient is a 85y old  Male who presents with a chief complaint of DSH, syncopal workup (10 Dec 2018 08:21)      SUBJECTIVE/OVERNIGHT EVENTS   No acute events overnight. No active complaints, mental status stable. Patient tolerating meals, without n/v. Denies fevers, chills, SOB. ROS otherwise negative.     OBJECTIVE  Vital Signs Last 24 Hrs  T(C): 36.4 (11 Dec 2018 04:23), Max: 36.7 (10 Dec 2018 20:20)  T(F): 97.5 (11 Dec 2018 04:23), Max: 98 (10 Dec 2018 20:20)  HR: 61 (11 Dec 2018 04:23) (60 - 65)  BP: 178/83 (11 Dec 2018 04:23) (155/55 - 199/89)  BP(mean): --  RR: 18 (11 Dec 2018 04:23) (18 - 18)  SpO2: 99% (11 Dec 2018 04:23) (98% - 100%)    I&O's Summary    10 Dec 2018 07:01  -  11 Dec 2018 07:00  --------------------------------------------------------  IN: 920 mL / OUT: 580 mL / NET: 340 mL    PHYSICAL EXAM:  GENERAL: NAD  HEAD:  Atraumatic, Normocephalic  EYES: EOMI, conjunctiva and sclera clear  NECK: Supple, No JVD  CHEST/LUNG: Clear to auscultation bilaterally; No wheeze  HEART: Regular rate and rhythm; No murmurs, rubs, or gallops  ABDOMEN: Soft, Nontender, Nondistended; Bowel sounds present  EXTREMITIES:  2+ Peripheral Pulses, No clubbing, cyanosis, or edema  PSYCH: AAOx3  NEUROLOGY: R sided hypertonicity, 3/5 strength in the LUE  SKIN: No rashes or lesions    LABS                        10.0   3.39  )-----------( 174      ( 10 Dec 2018 13:36 )             30.4                         10.9   3.44  )-----------( 170      ( 09 Dec 2018 12:07 )             33.4     12-10    135  |  99  |  36<H>  ----------------------------<  105<H>  4.5   |  24  |  1.37<H>  12-09    134<L>  |  97  |  35<H>  ----------------------------<  105<H>  4.5   |  26  |  1.37<H>    Ca    10.0      10 Dec 2018 11:18  Ca    10.2      09 Dec 2018 10:48  Phos  2.4     12-10  Mg     2.6     12-10    RADIOLOGY & ADDITIONAL TESTS:    MEDICATIONS  (STANDING):  amLODIPine   Tablet 10 milliGRAM(s) Oral daily  atorvastatin 10 milliGRAM(s) Oral at bedtime  dexamethasone     Tablet 4 milliGRAM(s) Oral two times a day  enzalutamide 160 milliGRAM(s) Oral daily  fludroCORTISONE 0.1 milliGRAM(s) Oral daily  hydrALAZINE 25 milliGRAM(s) Oral every 12 hours  levETIRAcetam 1000 milliGRAM(s) Oral two times a day  tamsulosin 0.4 milliGRAM(s) Oral at bedtime    MEDICATIONS  (PRN):

## 2018-12-11 NOTE — DIETITIAN INITIAL EVALUATION ADULT. - ENERGY NEEDS
Height (cm): 175.26 (12-05)  Weight (kg): 90.7 (12-05)  BMI (kg/m2): 29.5 (12-05)  IBW (kg): 72.7 +/-10%   % IBW: 124.7  No edema or pressure injuries noted Height (cm): 175.26 (12-05)  Weight (kg): 90.7 (12-05)  BMI (kg/m2): 29.5 (12-05)  IBW (kg): 72.7 +/-10%   % IBW: 124.7  No edema or pressure injuries noted per nursing flow sheet

## 2018-12-11 NOTE — DIETITIAN INITIAL EVALUATION ADULT. - OTHER INFO
Pt seen for length of stay. Pt with PMHx HTN, dementia, seizures and prostate Ca. Currently presents with SDH s/p fall at home. Pt interviewed at bedside, observed consuming >75% breakfast this am. Per PCA at bedside, appetite normally good, consumes >75% of most meals. No nausea/vomiting/constipation/diarrhea or chewing/swallowing swallowing problems reported, NKFA. Pt seen for length of stay. Pt with PMHx HTN, dementia, seizures and prostate Ca. Currently presents with SDH s/p fall at home. Pt interviewed at bedside, observed consuming >75% breakfast this am. Per PCA at bedside, appetite normally good, consumes >75% of most meals. No nausea/vomiting/constipation/diarrhea problems reported, NKFA. Per PCA, Pt tolerating dysphasia II diet well without chewing/swallowing problems. Pt seen for length of stay. Pt with PMHx HTN, dementia, seizures and prostate Ca. Currently presents with SDH s/p fall at home. Pt interviewed at bedside, observed consuming >75% breakfast this am. Per PCA at bedside, appetite normally good, consumes >75% of most meals. No nausea/vomiting/constipation/diarrhea problems reported, NKFA. Per PCA, Pt tolerating dysphasia II diet well without chewing/swallowing problems. Dosing weight 90.7 kg, no other weights recorded at this time.

## 2018-12-11 NOTE — CHART NOTE - NSCHARTNOTEFT_GEN_A_CORE
R3 MEDICINE UPDATE NOTE    RRT called for syncope.  This provider present during rapid.  Pt had a syncopal episode after standing up after a bowel movement.  No head trauma.  Vitals acceptable, although BP lower than baseline.  Decision made to give 500cc bolus over 2 hours, decrease hydralazine dose, continue fludrocortisone.  Tele discontinued yesterday.  High suspicion for vaso-vagal syncope and thus will not restart telemetry monitoring at this time.    Plan discussed with Dr. Zapata.    Nichole Kennedy MD  PGY-3 | Internal Medicine  [phone] / [phone]

## 2018-12-11 NOTE — DIETITIAN INITIAL EVALUATION ADULT. - NS AS NUTRI INTERV STRATEGIES
1) Continue current diet as tolerated, defer consistency to team/SLP  2) Monitor PO intake / labs / GI function / skin integrity  3) Obtain current and weekly weights  4) RDN to remain available

## 2018-12-11 NOTE — DIETITIAN INITIAL EVALUATION ADULT. - PROBLEM SELECTOR PLAN 4
Reports being diagnosed by doctors at a OS with seizures and has been on Keppra 1,000 mg BID daily. However patient has never followed up with a neurologist.  - Neurology consulted appreciate tess Lopez

## 2018-12-11 NOTE — PROGRESS NOTE ADULT - PROBLEM SELECTOR PLAN 1
Patient with mixed acute and subactue w/ some midline shift SDH on NCHCT, family opt for conservative management.   - Repeat CTH on 12/6 stable SDH, SDH stable   - s/p DDAVP on 12/5  - c/w PO decadron 4mg BID until f/u with neurosurgery as outpatient   - will follow up with Neurosurg in regard to length of decadron use   - No surgical intervention at this time  - Neurosurgery following, will hold off on aspirin until seen as outpatient  - c/w keppra 1000mg BID

## 2018-12-11 NOTE — CHART NOTE - NSCHARTNOTEFT_GEN_A_CORE
RRT called for syncope. Briefly, pt w/ known hx of orthostatic hypotension a/w for management of SDH. Per nursing staff, pt had just finished using the commode and got up and took a few steps before syncopizing. He was caught by staff and lowered to chair. No head trauma. Upon arrival, VSS and pt's mental status improving, arousable and answering questions appropriately. . Repeat EKG with atrial-paced at 60bpm. Will administer 500cc bolus, syncope likely vaso-vagal in the setting of known orthostatic hypotension. Primary team at bedside.    Fabian Tucker PGY3

## 2018-12-11 NOTE — DIETITIAN INITIAL EVALUATION ADULT. - PERTINENT LABORATORY DATA
12-10 eFGR 54<L> Cr 1.37<H>   BUN 36<H>   Phos 2.4<L>   Hgb 10.0 g/dL<L> Hct 30.4 %<L>  Glucose, Serum: 105 mg/dL <H>

## 2018-12-12 PROCEDURE — 99232 SBSQ HOSP IP/OBS MODERATE 35: CPT | Mod: GC

## 2018-12-12 RX ADMIN — FLUDROCORTISONE ACETATE 0.1 MILLIGRAM(S): 0.1 TABLET ORAL at 05:09

## 2018-12-12 RX ADMIN — Medication 4 MILLIGRAM(S): at 05:09

## 2018-12-12 RX ADMIN — AMLODIPINE BESYLATE 10 MILLIGRAM(S): 2.5 TABLET ORAL at 05:09

## 2018-12-12 RX ADMIN — TAMSULOSIN HYDROCHLORIDE 0.4 MILLIGRAM(S): 0.4 CAPSULE ORAL at 21:47

## 2018-12-12 RX ADMIN — Medication 4 MILLIGRAM(S): at 17:16

## 2018-12-12 RX ADMIN — Medication 25 MILLIGRAM(S): at 17:15

## 2018-12-12 RX ADMIN — ENZALUTAMIDE 160 MILLIGRAM(S): 80 TABLET ORAL at 11:48

## 2018-12-12 RX ADMIN — LEVETIRACETAM 1000 MILLIGRAM(S): 250 TABLET, FILM COATED ORAL at 17:16

## 2018-12-12 RX ADMIN — ATORVASTATIN CALCIUM 10 MILLIGRAM(S): 80 TABLET, FILM COATED ORAL at 21:47

## 2018-12-12 RX ADMIN — Medication 25 MILLIGRAM(S): at 05:09

## 2018-12-12 RX ADMIN — LEVETIRACETAM 1000 MILLIGRAM(S): 250 TABLET, FILM COATED ORAL at 05:09

## 2018-12-12 NOTE — PROGRESS NOTE ADULT - SUBJECTIVE AND OBJECTIVE BOX
Seun Miles MD DARRYL  Internal Medicine PGY-1  Pager [facility]: [phone]; [facility] [id number]    Patient is a 85y old  Male who presents with a chief complaint of DSH, syncopal workup (12 Dec 2018 07:37)      SUBJECTIVE/OVERNIGHT EVENTS   Yesterday with syncopal episode when standing after being on the commode, received 500cc bolus and reduced dose of hydralazine, patient does not remember this event. No acute events overnight. No active complaints. Patient tolerating meals, without n/v. Reports having daily BM. Denies fevers, chills, SOB. ROS otherwise negative.     OBJECTIVE  Vital Signs Last 24 Hrs  T(C): 36.4 (12 Dec 2018 04:35), Max: 36.6 (11 Dec 2018 11:10)  T(F): 97.6 (12 Dec 2018 04:35), Max: 97.9 (11 Dec 2018 11:10)  HR: 60 (12 Dec 2018 04:35) (60 - 62)  BP: 138/70 (12 Dec 2018 04:35) (110/53 - 163/78)  BP(mean): --  RR: 18 (12 Dec 2018 04:35) (18 - 18)  SpO2: 99% (12 Dec 2018 04:35) (98% - 100%)    I&O's Summary    11 Dec 2018 07:01  -  12 Dec 2018 07:00  --------------------------------------------------------  IN: 2092 mL / OUT: 800 mL / NET: 1292 mL    PHYSICAL EXAM:  GENERAL: NAD  HEAD:  Atraumatic, Normocephalic  EYES: EOMI, conjunctiva and sclera clear  NECK: Supple, No JVD  CHEST/LUNG: Clear to auscultation bilaterally; No wheeze  HEART: Regular rate and rhythm; No murmurs, rubs, or gallops  ABDOMEN: Soft, Nontender, Nondistended; Bowel sounds present  EXTREMITIES:  2+ Peripheral Pulses, No clubbing, cyanosis, or edema  PSYCH: AAOx2  NEUROLOGY: LUE 3/5 muscle strength, increased tone of the RUE  SKIN: No rashes or lesions    LABS                        10.0   3.39  )-----------( 174      ( 10 Dec 2018 13:36 )             30.4     12-10    135  |  99  |  36<H>  ----------------------------<  105<H>  4.5   |  24  |  1.37<H>    Ca    10.0      10 Dec 2018 11:18  Phos  2.4     12-10  Mg     2.6     12-10      CAPILLARY BLOOD GLUCOSE  POCT Blood Glucose.: 160 mg/dL (11 Dec 2018 15:30)    MEDICATIONS  (STANDING):  amLODIPine   Tablet 10 milliGRAM(s) Oral daily  atorvastatin 10 milliGRAM(s) Oral at bedtime  dexamethasone     Tablet 4 milliGRAM(s) Oral two times a day  enzalutamide 160 milliGRAM(s) Oral daily  fludroCORTISONE 0.1 milliGRAM(s) Oral daily  hydrALAZINE 25 milliGRAM(s) Oral every 12 hours  levETIRAcetam 1000 milliGRAM(s) Oral two times a day  tamsulosin 0.4 milliGRAM(s) Oral at bedtime    MEDICATIONS  (PRN):

## 2018-12-12 NOTE — PROGRESS NOTE ADULT - PROBLEM SELECTOR PLAN 1
Patient with mixed acute and subactue w/ some midline shift SDH on NCHCT, family opt for conservative management.   - Repeat CTH on 12/6 stable SDH, SDH stable   - s/p DDAVP on 12/5  - c/w PO decadron 4mg BID until f/u with neurosurgery as outpatient   - will follow up with Neurosurg in regard to length of decadron use   - No surgical intervention at this time  - Neurosurgery following, will hold off on aspirin until seen as outpatient  - c/w keppra 1000mg BID Patient with mixed acute and subactue w/ some midline shift SDH on NCHCT, family opt for conservative management.   - Repeat CTH SDH stable   - s/p DDAVP on 12/5  - c/w PO decadron 4mg BID until f/u with neurosurgery as outpatient   - Neurosurgery following, will hold off on aspirin until seen as outpatient  - c/w keppra 1000mg BID Patient with mixed acute and subactue w/ minor midline shift SDH on NCHCT, family opt for conservative management.   - Repeat CTH SDH stable   - s/p DDAVP on 12/5  - c/w PO decadron 4mg BID until f/u with neurosurgery as outpatient   - Neurosurgery following, will hold off on aspirin until seen as outpatient  - c/w keppra 1000mg BID

## 2018-12-12 NOTE — PROGRESS NOTE ADULT - ASSESSMENT
85 year old Male PMHx of dementia, HTN, orthostatic hypotension, seizures, who presents transferred from an outside hospital for further workup on a sub dural hematoma seen on imaging at OSH, after a fall at home. DDx for this presentation includes syncope vs seizures; SDH stable mental status improving and awaiting RICKY 85 year old Male PMHx of dementia, HTN, orthostatic hypotension, seizures, who presents transferred from an outside hospital for further workup on a sub dural hematoma seen on imaging at OSH, after a fall at home. DDx for this presentation likely 2/2 syncope vs seizure (less likely); SDH stable mental status improving and awaiting RICKY

## 2018-12-12 NOTE — PROGRESS NOTE ADULT - PROBLEM SELECTOR PLAN 3
Reported history of previously diagnosed orthostatic hypotension  Syncopal episode yesterday after standing   - c/w fludrocortisone .1mg daily  - d/c midodrine 10mg TID (home med) Reported history of previously diagnosed orthostatic hypotension  Syncopal episode yesterday after standing, received 500cc bolus, and encouraged increased PO intake   - c/w fludrocortisone .1mg daily

## 2018-12-13 VITALS
HEART RATE: 61 BPM | DIASTOLIC BLOOD PRESSURE: 89 MMHG | SYSTOLIC BLOOD PRESSURE: 174 MMHG | TEMPERATURE: 98 F | OXYGEN SATURATION: 100 % | RESPIRATION RATE: 18 BRPM

## 2018-12-13 PROCEDURE — 99239 HOSP IP/OBS DSCHRG MGMT >30: CPT

## 2018-12-13 PROCEDURE — 71045 X-RAY EXAM CHEST 1 VIEW: CPT

## 2018-12-13 PROCEDURE — 84484 ASSAY OF TROPONIN QUANT: CPT

## 2018-12-13 PROCEDURE — 97161 PT EVAL LOW COMPLEX 20 MIN: CPT

## 2018-12-13 PROCEDURE — 96374 THER/PROPH/DIAG INJ IV PUSH: CPT

## 2018-12-13 PROCEDURE — 80048 BASIC METABOLIC PNL TOTAL CA: CPT

## 2018-12-13 PROCEDURE — 86900 BLOOD TYPING SEROLOGIC ABO: CPT

## 2018-12-13 PROCEDURE — 84100 ASSAY OF PHOSPHORUS: CPT

## 2018-12-13 PROCEDURE — 97530 THERAPEUTIC ACTIVITIES: CPT

## 2018-12-13 PROCEDURE — 95819 EEG AWAKE AND ASLEEP: CPT

## 2018-12-13 PROCEDURE — 97110 THERAPEUTIC EXERCISES: CPT

## 2018-12-13 PROCEDURE — 96375 TX/PRO/DX INJ NEW DRUG ADDON: CPT

## 2018-12-13 PROCEDURE — 82962 GLUCOSE BLOOD TEST: CPT

## 2018-12-13 PROCEDURE — 85610 PROTHROMBIN TIME: CPT

## 2018-12-13 PROCEDURE — 72128 CT CHEST SPINE W/O DYE: CPT

## 2018-12-13 PROCEDURE — 70450 CT HEAD/BRAIN W/O DYE: CPT

## 2018-12-13 PROCEDURE — 86901 BLOOD TYPING SEROLOGIC RH(D): CPT

## 2018-12-13 PROCEDURE — 80053 COMPREHEN METABOLIC PANEL: CPT

## 2018-12-13 PROCEDURE — 72131 CT LUMBAR SPINE W/O DYE: CPT

## 2018-12-13 PROCEDURE — 83880 ASSAY OF NATRIURETIC PEPTIDE: CPT

## 2018-12-13 PROCEDURE — 85027 COMPLETE CBC AUTOMATED: CPT

## 2018-12-13 PROCEDURE — 85730 THROMBOPLASTIN TIME PARTIAL: CPT

## 2018-12-13 PROCEDURE — 83735 ASSAY OF MAGNESIUM: CPT

## 2018-12-13 PROCEDURE — 99285 EMERGENCY DEPT VISIT HI MDM: CPT | Mod: 25

## 2018-12-13 PROCEDURE — 86850 RBC ANTIBODY SCREEN: CPT

## 2018-12-13 PROCEDURE — 81003 URINALYSIS AUTO W/O SCOPE: CPT

## 2018-12-13 RX ADMIN — LEVETIRACETAM 1000 MILLIGRAM(S): 250 TABLET, FILM COATED ORAL at 06:16

## 2018-12-13 RX ADMIN — ENZALUTAMIDE 160 MILLIGRAM(S): 80 TABLET ORAL at 12:17

## 2018-12-13 RX ADMIN — Medication 25 MILLIGRAM(S): at 06:15

## 2018-12-13 RX ADMIN — AMLODIPINE BESYLATE 10 MILLIGRAM(S): 2.5 TABLET ORAL at 06:16

## 2018-12-13 RX ADMIN — Medication 4 MILLIGRAM(S): at 06:16

## 2018-12-13 RX ADMIN — FLUDROCORTISONE ACETATE 0.1 MILLIGRAM(S): 0.1 TABLET ORAL at 06:15

## 2018-12-13 NOTE — PROGRESS NOTE ADULT - PROBLEM SELECTOR PLAN 1
Patient with mixed acute and subactue w/ minor midline shift SDH on NCHCT, family opt for conservative management.   - Repeat CTH SDH stable   - s/p DDAVP on 12/5  - c/w PO decadron 4mg BID until f/u with neurosurgery as outpatient   - Neurosurgery following, will hold off on aspirin until seen as outpatient  - c/w keppra 1000mg BID

## 2018-12-13 NOTE — PROGRESS NOTE ADULT - PROBLEM SELECTOR PROBLEM 3
Orthostatic hypotension

## 2018-12-13 NOTE — PROGRESS NOTE ADULT - REASON FOR ADMISSION
DSH, syncopal workup

## 2018-12-13 NOTE — PROGRESS NOTE ADULT - PROBLEM SELECTOR PLAN 3
Reported history of previously diagnosed orthostatic hypotension  Syncopal episode yesterday after standing, received 500cc bolus, and encouraged increased PO intake   - c/w fludrocortisone .1mg daily Reported history of previously diagnosed orthostatic hypotension  Syncopal episode during hospital course, received 500cc bolus with improved orthostatics afterwards, and encouraged increased PO intake   - c/w fludrocortisone .1mg daily

## 2018-12-13 NOTE — PROGRESS NOTE ADULT - ASSESSMENT
85 year old Male PMHx of dementia, HTN, orthostatic hypotension, seizures, who presents transferred from an outside hospital for further workup on a sub dural hematoma seen on imaging at OSH, after a fall at home. DDx for this presentation likely 2/2 syncope vs seizure (less likely); SDH stable mental status improving and awaiting RICKY

## 2018-12-13 NOTE — PROGRESS NOTE ADULT - ATTENDING COMMENTS
Pt lethargic this morning, unable to be engaged in conversation at all  At this time, no signs/symptoms of infection (remains afebrile), UA negative, no clear electrolyte abnormalities to explain his lethargy  Obtain CXR to rule out PNA  Appreciate neuro recs on keppra - s/p keppra load yesterday which could contribute to lethargy?  Repeat CTH to evaluate the stability of SDH    Alysha Zapata MD  Division of Hospital Medicine  Pager: [phone]  Office: [phone]
Pt remains medically stable, awaiting insurance prior-auth for [facility] placement. CM aware and waiting for the approval    47 minutes spent on discharge process    Alysha Zapata MD  Division of Hospital Medicine  Pager: [phone]  Office: [phone]
pending [facility] placement/prior-auth    Alysha Zapata MD  Division of Hospital Medicine  Pager: [phone]  Office: [phone]
I personally performed the E/M service provided and was physically present during key portions of the service furnished by the resident/fellow. I agree with the history, physical and assessment/plan as stated above.
pending [facility] placement. Appreciate CM input for safe discharge planning    Alysha Zapata MD  Division of Hospital Medicine  Pager: [phone]  Office: [phone]
pt rec beulah
syncopal episode reviewed as stated above. Pt remains asymptomatic this morning, able to stand up without lightheadedness or dizziness. Pt medically stable for discharge to [facility], awaiting prior-authorization - CM aware.     46 minutes spent on discharge process    Alysha Zapata MD  Division of Hospital Medicine  Pager: [phone]  Office: [phone]
Pt with improvement in mental status - awaiting EEG to rule out epilepsy as etiology for syncope  Pending PT consult    Alysha Zapata MD  Division of Hospital Medicine  Pager: [phone]  Office: [phone]

## 2018-12-13 NOTE — PROGRESS NOTE ADULT - PROBLEM SELECTOR PROBLEM 1
SDH (subdural hematoma)

## 2018-12-19 DIAGNOSIS — S06.5X9A TRAUMATIC SUBDURAL HEMORRHAGE WITH LOSS OF CONSCIOUSNESS OF UNSPECIFIED DURATION, INITIAL ENCOUNTER: ICD-10-CM

## 2019-01-22 ENCOUNTER — APPOINTMENT (OUTPATIENT)
Dept: ELECTROPHYSIOLOGY | Facility: CLINIC | Age: 84
End: 2019-01-22
Payer: MEDICARE

## 2019-01-22 PROCEDURE — 93296 REM INTERROG EVL PM/IDS: CPT

## 2019-01-22 PROCEDURE — 93294 REM INTERROG EVL PM/LDLS PM: CPT

## 2019-03-14 ENCOUNTER — APPOINTMENT (OUTPATIENT)
Dept: ELECTROPHYSIOLOGY | Facility: CLINIC | Age: 84
End: 2019-03-14
Payer: MEDICARE

## 2019-03-14 PROBLEM — R56.9 UNSPECIFIED CONVULSIONS: Chronic | Status: ACTIVE | Noted: 2018-12-05

## 2019-03-14 PROBLEM — I95.1 ORTHOSTATIC HYPOTENSION: Chronic | Status: ACTIVE | Noted: 2018-12-05

## 2019-03-14 PROCEDURE — 93280 PM DEVICE PROGR EVAL DUAL: CPT

## 2019-03-14 RX ORDER — LEVETIRACETAM 1000 MG/1
1000 TABLET, FILM COATED ORAL
Refills: 0 | Status: DISCONTINUED | COMMUNITY
End: 2019-03-14

## 2019-03-14 RX ORDER — CITALOPRAM HYDROBROMIDE 10 MG/1
10 TABLET, FILM COATED ORAL
Refills: 0 | Status: ACTIVE | COMMUNITY

## 2019-03-14 RX ORDER — OXYBUTYNIN CHLORIDE 5 MG/1
5 TABLET ORAL
Refills: 0 | Status: ACTIVE | COMMUNITY

## 2019-03-14 RX ORDER — HYDRALAZINE HYDROCHLORIDE 25 MG/1
25 TABLET ORAL TWICE DAILY
Refills: 0 | Status: ACTIVE | COMMUNITY

## 2019-03-14 RX ORDER — DONEPEZIL HYDROCHLORIDE 5 MG/1
5 TABLET ORAL
Refills: 0 | Status: ACTIVE | COMMUNITY

## 2019-03-14 RX ORDER — MIRABEGRON 50 MG/1
50 TABLET, FILM COATED, EXTENDED RELEASE ORAL
Refills: 0 | Status: ACTIVE | COMMUNITY

## 2019-06-17 ENCOUNTER — APPOINTMENT (OUTPATIENT)
Dept: ELECTROPHYSIOLOGY | Facility: CLINIC | Age: 84
End: 2019-06-17
Payer: MEDICARE

## 2019-06-17 DIAGNOSIS — Z86.79 PERSONAL HISTORY OF OTHER DISEASES OF THE CIRCULATORY SYSTEM: ICD-10-CM

## 2019-06-17 PROCEDURE — 93296 REM INTERROG EVL PM/IDS: CPT

## 2019-06-17 PROCEDURE — 93294 REM INTERROG EVL PM/LDLS PM: CPT

## 2019-08-26 ENCOUNTER — INPATIENT (INPATIENT)
Facility: HOSPITAL | Age: 84
LOS: 9 days | Discharge: HOME | DRG: 949 | End: 2019-09-05
Attending: HOSPITALIST | Admitting: HOSPITALIST
Payer: COMMERCIAL

## 2019-08-26 VITALS
SYSTOLIC BLOOD PRESSURE: 133 MMHG | DIASTOLIC BLOOD PRESSURE: 84 MMHG | TEMPERATURE: 98 F | HEART RATE: 84 BPM | OXYGEN SATURATION: 97 % | RESPIRATION RATE: 15 BRPM

## 2019-08-26 DIAGNOSIS — R55 SYNCOPE AND COLLAPSE: ICD-10-CM

## 2019-08-26 LAB
ALBUMIN SERPL ELPH-MCNC: 4.2 G/DL — SIGNIFICANT CHANGE UP (ref 3.3–5)
ALP SERPL-CCNC: 123 U/L — HIGH (ref 40–120)
ALT FLD-CCNC: 13 U/L — SIGNIFICANT CHANGE UP (ref 10–45)
ANION GAP SERPL CALC-SCNC: 13 MMOL/L — SIGNIFICANT CHANGE UP (ref 5–17)
APTT BLD: 31.4 SEC — SIGNIFICANT CHANGE UP (ref 27.5–36.3)
AST SERPL-CCNC: 31 U/L — SIGNIFICANT CHANGE UP (ref 10–40)
BASOPHILS # BLD AUTO: 0 K/UL — SIGNIFICANT CHANGE UP (ref 0–0.2)
BASOPHILS NFR BLD AUTO: 0.2 % — SIGNIFICANT CHANGE UP (ref 0–2)
BILIRUB SERPL-MCNC: 0.8 MG/DL — SIGNIFICANT CHANGE UP (ref 0.2–1.2)
BLD GP AB SCN SERPL QL: NEGATIVE — SIGNIFICANT CHANGE UP
BUN SERPL-MCNC: 26 MG/DL — HIGH (ref 7–23)
CALCIUM SERPL-MCNC: 10.6 MG/DL — HIGH (ref 8.4–10.5)
CHLORIDE SERPL-SCNC: 103 MMOL/L — SIGNIFICANT CHANGE UP (ref 96–108)
CO2 SERPL-SCNC: 24 MMOL/L — SIGNIFICANT CHANGE UP (ref 22–31)
CREAT SERPL-MCNC: 1.49 MG/DL — HIGH (ref 0.5–1.3)
EOSINOPHIL # BLD AUTO: 0.1 K/UL — SIGNIFICANT CHANGE UP (ref 0–0.5)
EOSINOPHIL NFR BLD AUTO: 1.8 % — SIGNIFICANT CHANGE UP (ref 0–6)
GLUCOSE SERPL-MCNC: 102 MG/DL — HIGH (ref 70–99)
HCT VFR BLD CALC: 38.2 % — LOW (ref 39–50)
HGB BLD-MCNC: 12.3 G/DL — LOW (ref 13–17)
INR BLD: 0.97 RATIO — SIGNIFICANT CHANGE UP (ref 0.88–1.16)
LYMPHOCYTES # BLD AUTO: 1 K/UL — SIGNIFICANT CHANGE UP (ref 1–3.3)
LYMPHOCYTES # BLD AUTO: 22 % — SIGNIFICANT CHANGE UP (ref 13–44)
MCHC RBC-ENTMCNC: 32.2 GM/DL — SIGNIFICANT CHANGE UP (ref 32–36)
MCHC RBC-ENTMCNC: 32.9 PG — SIGNIFICANT CHANGE UP (ref 27–34)
MCV RBC AUTO: 102 FL — HIGH (ref 80–100)
MONOCYTES # BLD AUTO: 0.3 K/UL — SIGNIFICANT CHANGE UP (ref 0–0.9)
MONOCYTES NFR BLD AUTO: 5.6 % — SIGNIFICANT CHANGE UP (ref 2–14)
NEUTROPHILS # BLD AUTO: 3.3 K/UL — SIGNIFICANT CHANGE UP (ref 1.8–7.4)
NEUTROPHILS NFR BLD AUTO: 70.4 % — SIGNIFICANT CHANGE UP (ref 43–77)
PLATELET # BLD AUTO: 156 K/UL — SIGNIFICANT CHANGE UP (ref 150–400)
POTASSIUM SERPL-MCNC: 5.7 MMOL/L — HIGH (ref 3.5–5.3)
POTASSIUM SERPL-SCNC: 5.7 MMOL/L — HIGH (ref 3.5–5.3)
PROT SERPL-MCNC: 7.4 G/DL — SIGNIFICANT CHANGE UP (ref 6–8.3)
PROTHROM AB SERPL-ACNC: 11.2 SEC — SIGNIFICANT CHANGE UP (ref 10–12.9)
RBC # BLD: 3.73 M/UL — LOW (ref 4.2–5.8)
RBC # FLD: 11.9 % — SIGNIFICANT CHANGE UP (ref 10.3–14.5)
RH IG SCN BLD-IMP: POSITIVE — SIGNIFICANT CHANGE UP
SODIUM SERPL-SCNC: 140 MMOL/L — SIGNIFICANT CHANGE UP (ref 135–145)
WBC # BLD: 4.7 K/UL — SIGNIFICANT CHANGE UP (ref 3.8–10.5)
WBC # FLD AUTO: 4.7 K/UL — SIGNIFICANT CHANGE UP (ref 3.8–10.5)

## 2019-08-26 PROCEDURE — 93010 ELECTROCARDIOGRAM REPORT: CPT

## 2019-08-26 PROCEDURE — 70450 CT HEAD/BRAIN W/O DYE: CPT | Mod: 26

## 2019-08-26 PROCEDURE — 99285 EMERGENCY DEPT VISIT HI MDM: CPT

## 2019-08-26 RX ORDER — LEVETIRACETAM 250 MG/1
500 TABLET, FILM COATED ORAL ONCE
Refills: 0 | Status: COMPLETED | OUTPATIENT
Start: 2019-08-26 | End: 2019-08-26

## 2019-08-26 RX ADMIN — LEVETIRACETAM 400 MILLIGRAM(S): 250 TABLET, FILM COATED ORAL at 22:14

## 2019-08-26 NOTE — ED PROVIDER NOTE - ATTENDING CONTRIBUTION TO CARE
86M,p mh dementia, prostate ca, hypotension, seizure disorder, presents as transfer from [facility] with sdh, initially presented s/p syncopal episode. per family, pt had episode of unresponsiveness at home earlier today, was in seated position, lost consciousness for ~20 min. no a/c other than asa 81 mg. family denies any recent illnesses or complaints from patient. at baseline pt is verbal however does not make sense. pt unable to provide any hx.    PE: Pt awake, alert, but oriented x 0, NCAT, MMM, Trachea midline, Normal conjunctiva, lungs CTAB, S1/S2 RRR, Normal perfusion, 2+ radial pulses bilat, Abdomen Soft, NTND, No rebound/guarding, No LE edema, No deformity of extremities, No rashes,  No focal motor or sensory deficits. Pt agitated, moving all extremities.    Pt transferred for nsgy evaluation. Will consult NSGY, although per report sdh appears chronic. Will obtain labwork, ekg. Plan for admission to nsgy vs medicine. - Joshua Benítez MD

## 2019-08-26 NOTE — ED PROVIDER NOTE - RESPIRATORY, MLM
Breath sounds clear and equal bilaterally. satting 100 on RA (when taken off of nrb brought in by ems)

## 2019-08-26 NOTE — ED ADULT NURSE NOTE - OBJECTIVE STATEMENT
87 y/o male A&Ox0 presents to ED as transfer from [place] for SDH. As per EMS, pt was found down by home health aid, unknown LOC and unknown amount of time. Pt was brought to [place], diagnosed with SDH, IO placed in left shin by [place]. EMS reports that pt takes Keppra but does not know if tranfering facility administered Keppra. Upon arrival pt alert but speech incoherent, +PERRLA 3mm, strong hand  b/l, strong movement of legs b/l. B/l breath sounds clear, non labored, no noted edema or cough. Abdomen soft, non tender and non distended. Daughter of pt at bedside reports that pt is not baseline self, speech is more incoherent then baseline and has been getting worse x2 months. Safety measures maintained with daughter at bedside.

## 2019-08-26 NOTE — ED PROVIDER NOTE - CLINICAL SUMMARY MEDICAL DECISION MAKING FREE TEXT BOX
87 yo M c PMH of dementia, prostate ca, hypotension, seizures transferred from [place] with SDH. On exam, VS wnl, pt eyes open spontaneously pupil 3 mm perrl speaking incoherently. neurosurgery aware, requests repeat cth now. labs pending. will reassess.

## 2019-08-27 DIAGNOSIS — S06.5X9A TRAUMATIC SUBDURAL HEMORRHAGE WITH LOSS OF CONSCIOUSNESS OF UNSPECIFIED DURATION, INITIAL ENCOUNTER: ICD-10-CM

## 2019-08-27 DIAGNOSIS — R41.82 ALTERED MENTAL STATUS, UNSPECIFIED: ICD-10-CM

## 2019-08-27 DIAGNOSIS — C61 MALIGNANT NEOPLASM OF PROSTATE: ICD-10-CM

## 2019-08-27 DIAGNOSIS — R53.2 FUNCTIONAL QUADRIPLEGIA: ICD-10-CM

## 2019-08-27 DIAGNOSIS — G93.40 ENCEPHALOPATHY, UNSPECIFIED: ICD-10-CM

## 2019-08-27 DIAGNOSIS — F03.90 UNSPECIFIED DEMENTIA WITHOUT BEHAVIORAL DISTURBANCE: ICD-10-CM

## 2019-08-27 DIAGNOSIS — N18.3 CHRONIC KIDNEY DISEASE, STAGE 3 (MODERATE): ICD-10-CM

## 2019-08-27 DIAGNOSIS — Z29.9 ENCOUNTER FOR PROPHYLACTIC MEASURES, UNSPECIFIED: ICD-10-CM

## 2019-08-27 DIAGNOSIS — I95.1 ORTHOSTATIC HYPOTENSION: ICD-10-CM

## 2019-08-27 DIAGNOSIS — R55 SYNCOPE AND COLLAPSE: ICD-10-CM

## 2019-08-27 LAB
ALBUMIN SERPL ELPH-MCNC: 3.7 G/DL — SIGNIFICANT CHANGE UP (ref 3.3–5)
ALP SERPL-CCNC: 120 U/L — SIGNIFICANT CHANGE UP (ref 40–120)
ALT FLD-CCNC: 12 U/L — SIGNIFICANT CHANGE UP (ref 10–45)
ANION GAP SERPL CALC-SCNC: 11 MMOL/L — SIGNIFICANT CHANGE UP (ref 5–17)
APPEARANCE UR: CLEAR — SIGNIFICANT CHANGE UP
AST SERPL-CCNC: 19 U/L — SIGNIFICANT CHANGE UP (ref 10–40)
BASOPHILS # BLD AUTO: 0 K/UL — SIGNIFICANT CHANGE UP (ref 0–0.2)
BASOPHILS NFR BLD AUTO: 0.7 % — SIGNIFICANT CHANGE UP (ref 0–2)
BILIRUB SERPL-MCNC: 1.3 MG/DL — HIGH (ref 0.2–1.2)
BILIRUB UR-MCNC: NEGATIVE — SIGNIFICANT CHANGE UP
BUN SERPL-MCNC: 29 MG/DL — HIGH (ref 7–23)
CALCIUM SERPL-MCNC: 10.7 MG/DL — HIGH (ref 8.4–10.5)
CHLORIDE SERPL-SCNC: 104 MMOL/L — SIGNIFICANT CHANGE UP (ref 96–108)
CO2 SERPL-SCNC: 24 MMOL/L — SIGNIFICANT CHANGE UP (ref 22–31)
COLOR SPEC: YELLOW — SIGNIFICANT CHANGE UP
CREAT SERPL-MCNC: 1.44 MG/DL — HIGH (ref 0.5–1.3)
DIFF PNL FLD: NEGATIVE — SIGNIFICANT CHANGE UP
EOSINOPHIL # BLD AUTO: 0.2 K/UL — SIGNIFICANT CHANGE UP (ref 0–0.5)
EOSINOPHIL NFR BLD AUTO: 6 % — SIGNIFICANT CHANGE UP (ref 0–6)
FOLATE SERPL-MCNC: 9.5 NG/ML — SIGNIFICANT CHANGE UP
GLUCOSE SERPL-MCNC: 93 MG/DL — SIGNIFICANT CHANGE UP (ref 70–99)
GLUCOSE UR QL: ABNORMAL
HCT VFR BLD CALC: 33 % — LOW (ref 39–50)
HGB BLD-MCNC: 10.8 G/DL — LOW (ref 13–17)
KETONES UR-MCNC: SIGNIFICANT CHANGE UP
LEUKOCYTE ESTERASE UR-ACNC: NEGATIVE — SIGNIFICANT CHANGE UP
LYMPHOCYTES # BLD AUTO: 0.9 K/UL — LOW (ref 1–3.3)
LYMPHOCYTES # BLD AUTO: 23.3 % — SIGNIFICANT CHANGE UP (ref 13–44)
MAGNESIUM SERPL-MCNC: 2.4 MG/DL — SIGNIFICANT CHANGE UP (ref 1.6–2.6)
MCHC RBC-ENTMCNC: 32.6 GM/DL — SIGNIFICANT CHANGE UP (ref 32–36)
MCHC RBC-ENTMCNC: 34 PG — SIGNIFICANT CHANGE UP (ref 27–34)
MCV RBC AUTO: 104 FL — HIGH (ref 80–100)
MONOCYTES # BLD AUTO: 0.3 K/UL — SIGNIFICANT CHANGE UP (ref 0–0.9)
MONOCYTES NFR BLD AUTO: 7.6 % — SIGNIFICANT CHANGE UP (ref 2–14)
NEUTROPHILS # BLD AUTO: 2.4 K/UL — SIGNIFICANT CHANGE UP (ref 1.8–7.4)
NEUTROPHILS NFR BLD AUTO: 62.5 % — SIGNIFICANT CHANGE UP (ref 43–77)
NITRITE UR-MCNC: NEGATIVE — SIGNIFICANT CHANGE UP
PH UR: 5.5 — SIGNIFICANT CHANGE UP (ref 5–8)
PHOSPHATE SERPL-MCNC: 3.1 MG/DL — SIGNIFICANT CHANGE UP (ref 2.5–4.5)
PLATELET # BLD AUTO: 116 K/UL — LOW (ref 150–400)
POTASSIUM SERPL-MCNC: 4.3 MMOL/L — SIGNIFICANT CHANGE UP (ref 3.5–5.3)
POTASSIUM SERPL-SCNC: 4.3 MMOL/L — SIGNIFICANT CHANGE UP (ref 3.5–5.3)
PROT SERPL-MCNC: 6.8 G/DL — SIGNIFICANT CHANGE UP (ref 6–8.3)
PROT UR-MCNC: ABNORMAL
RBC # BLD: 3.17 M/UL — LOW (ref 4.2–5.8)
RBC # FLD: 11.8 % — SIGNIFICANT CHANGE UP (ref 10.3–14.5)
SODIUM SERPL-SCNC: 139 MMOL/L — SIGNIFICANT CHANGE UP (ref 135–145)
SP GR SPEC: 1.02 — SIGNIFICANT CHANGE UP (ref 1.01–1.02)
UROBILINOGEN FLD QL: NEGATIVE — SIGNIFICANT CHANGE UP
VIT B12 SERPL-MCNC: 310 PG/ML — SIGNIFICANT CHANGE UP (ref 232–1245)
WBC # BLD: 3.9 K/UL — SIGNIFICANT CHANGE UP (ref 3.8–10.5)
WBC # FLD AUTO: 3.9 K/UL — SIGNIFICANT CHANGE UP (ref 3.8–10.5)

## 2019-08-27 PROCEDURE — 93306 TTE W/DOPPLER COMPLETE: CPT | Mod: 26

## 2019-08-27 PROCEDURE — 99223 1ST HOSP IP/OBS HIGH 75: CPT | Mod: GC

## 2019-08-27 PROCEDURE — 70450 CT HEAD/BRAIN W/O DYE: CPT | Mod: 26

## 2019-08-27 PROCEDURE — 12345: CPT | Mod: NC

## 2019-08-27 RX ORDER — MIRABEGRON 50 MG/1
1 TABLET, EXTENDED RELEASE ORAL
Qty: 0 | Refills: 0 | DISCHARGE

## 2019-08-27 RX ORDER — ATORVASTATIN CALCIUM 80 MG/1
40 TABLET, FILM COATED ORAL AT BEDTIME
Refills: 0 | Status: DISCONTINUED | OUTPATIENT
Start: 2019-08-27 | End: 2019-09-05

## 2019-08-27 RX ORDER — HYDRALAZINE HCL 50 MG
5 TABLET ORAL EVERY 8 HOURS
Refills: 0 | Status: DISCONTINUED | OUTPATIENT
Start: 2019-08-27 | End: 2019-08-30

## 2019-08-27 RX ORDER — TAMSULOSIN HYDROCHLORIDE 0.4 MG/1
1 CAPSULE ORAL
Qty: 0 | Refills: 0 | DISCHARGE

## 2019-08-27 RX ORDER — FLUDROCORTISONE ACETATE 0.1 MG/1
0.1 TABLET ORAL DAILY
Refills: 0 | Status: DISCONTINUED | OUTPATIENT
Start: 2019-08-27 | End: 2019-09-05

## 2019-08-27 RX ORDER — SODIUM CHLORIDE 9 MG/ML
1000 INJECTION INTRAMUSCULAR; INTRAVENOUS; SUBCUTANEOUS
Refills: 0 | Status: DISCONTINUED | OUTPATIENT
Start: 2019-08-27 | End: 2019-08-29

## 2019-08-27 RX ORDER — LEVETIRACETAM 250 MG/1
500 TABLET, FILM COATED ORAL EVERY 12 HOURS
Refills: 0 | Status: DISCONTINUED | OUTPATIENT
Start: 2019-08-27 | End: 2019-08-29

## 2019-08-27 RX ORDER — LEVETIRACETAM 250 MG/1
1000 TABLET, FILM COATED ORAL EVERY 12 HOURS
Refills: 0 | Status: DISCONTINUED | OUTPATIENT
Start: 2019-08-27 | End: 2019-08-27

## 2019-08-27 RX ADMIN — LEVETIRACETAM 400 MILLIGRAM(S): 250 TABLET, FILM COATED ORAL at 17:27

## 2019-08-27 RX ADMIN — LEVETIRACETAM 400 MILLIGRAM(S): 250 TABLET, FILM COATED ORAL at 09:03

## 2019-08-27 RX ADMIN — Medication 5 MILLIGRAM(S): at 13:39

## 2019-08-27 NOTE — H&P ADULT - PROBLEM SELECTOR PLAN 3
AAOx1-2 at baseline, currently AAOx1 (oriented to self), but less responsive. Low suspicion for infection given afebrile and no leukocytosis. More likely related to recent fall vs postictal state vs SDH-related.  - check UA  - fall precautions  - aspiration precautions  - Keep NPO pending bedside dysphagia eval AAOx1-2 at baseline, currently AAOx1 (oriented to self), but less responsive. Low suspicion for infection given afebrile and no leukocytosis. More likely related to recent fall vs postictal state vs SDH-related.  - check RPR/B12/Folic acid  - check UA  - fall precautions  - aspiration precautions  - Keep NPO pending bedside dysphagia eval AAOx1-2 at baseline, currently AAOx1 (oriented to self), but less responsive. Per daughter, patient is also speaking gibberish, which is not baseline;  Low suspicion for infection given afebrile and no leukocytosis. More likely related to recent fall vs postictal state vs SDH-related vs. ischemic CVA  - check RPR/B12/Folic acid  - check UA  - fall precautions  - aspiration precautions  - Keep NPO pending bedside dysphagia eval  - neurology consult

## 2019-08-27 NOTE — PROGRESS NOTE ADULT - PROBLEM SELECTOR PLAN 2
Left frontoparietal chronic subdural hematoma, w/ mass effect on the left frontal lobe and rightward midline shift.  Repeat CT scan stable  - Evaluated by NSG, no acute surgical interventions.   - Hold off on steroids   - Neuro checks q4h  - c/w Keppra  - Fall precautions and aspiration precaution  - Hold ASA and DVT prophylaxis  - neurology consult appreciated

## 2019-08-27 NOTE — H&P ADULT - NSHPLABSRESULTS_GEN_ALL_CORE
08-26    140  |  103  |  26<H>  ----------------------------<  102<H>  5.7<H>   |  24  |  1.49<H>    Ca    10.6<H>      26 Aug 2019 17:10    TPro  7.4  /  Alb  4.2  /  TBili  0.8  /  DBili  x   /  AST  31  /  ALT  13  /  AlkPhos  123<H>  08-26      PT/INR - ( 26 Aug 2019 17:10 )   PT: 11.2 sec;   INR: 0.97 ratio         PTT - ( 26 Aug 2019 17:10 )  PTT:31.4 sec                                        12.3   4.7   )-----------( 156      ( 26 Aug 2019 17:10 )             38.2         RADIOLOGY:  CAPILLARY BLOOD GLUCOSE    < from: CT Head No Cont (08.26.19 @ 17:36) >  EXAM:  CT BRAIN                       PROCEDURE DATE:  08/26/2019      INTERPRETATION:  HISTORY: Patient found down. Outside hospital show   subdural hemorrhage.    Technique: CT of the head was performed without contrast.    Multiple contiguous axial images were acquired from the skullbase to the   vertex without the administration of intravenous contrast.  Coronal and   sagittal reformations were made.    COMPARISON: Prior head CT dated 12/5/2018    FINDINGS:  There is a low attenuating left frontoparietal extra-axial fluid   collection measuring 1.6 cm in maximal thickness, exerting mass effect on   the left frontal lobe as well as causing 6 mm of rightward midline shift.     No acute intraparenchymal hemorrhage.    There is no CT evidence of acute transcortical infarct. Age-related   involutional changes and chronic microvascular ischemic changes.    There is no hydrocephalus. Basal cisterns are patent.    The visualized paranasal sinuses and mastoid air cells are clear.    The calvarium is intact.    IMPRESSION:  Left frontoparietal chronic subdural hematoma measuring 1.6 cm in maximal   thickness, exerting mass effect on the left frontal lobe as well as   causing 6 mm of rightward midline shift.    < end of copied text > Labs reviewed    140  |  103  |  26<H>  ----------------------------<  102<H>  5.7<H>   |  24  |  1.49<H>    Ca    10.6<H>      26 Aug 2019 17:10    TPro  7.4  /  Alb  4.2  /  TBili  0.8  /  DBili  x   /  AST  31  /  ALT  13  /  AlkPhos  123<H>  08-26      PT/INR - ( 26 Aug 2019 17:10 )   PT: 11.2 sec;   INR: 0.97 ratio         PTT - ( 26 Aug 2019 17:10 )  PTT:31.4 sec                                        12.3   4.7   )-----------( 156      ( 26 Aug 2019 17:10 )             38.2     Imaging reviewed    < from: CT Head No Cont (08.26.19 @ 17:36) >  EXAM:  CT BRAIN                       PROCEDURE DATE:  08/26/2019      INTERPRETATION:  HISTORY: Patient found down. Outside hospital show   subdural hemorrhage.    Technique: CT of the head was performed without contrast.    Multiple contiguous axial images were acquired from the skullbase to the   vertex without the administration of intravenous contrast.  Coronal and   sagittal reformations were made.    COMPARISON: Prior head CT dated 12/5/2018    FINDINGS:  There is a low attenuating left frontoparietal extra-axial fluid   collection measuring 1.6 cm in maximal thickness, exerting mass effect on   the left frontal lobe as well as causing 6 mm of rightward midline shift.     No acute intraparenchymal hemorrhage.    There is no CT evidence of acute transcortical infarct. Age-related   involutional changes and chronic microvascular ischemic changes.    There is no hydrocephalus. Basal cisterns are patent.    The visualized paranasal sinuses and mastoid air cells are clear.    The calvarium is intact.    IMPRESSION:  Left frontoparietal chronic subdural hematoma measuring 1.6 cm in maximal   thickness, exerting mass effect on the left frontal lobe as well as   causing 6 mm of rightward midline shift.    EKG reviewed:  EKG - NSR, no ST-T wave changes, QTc 413

## 2019-08-27 NOTE — CONSULT NOTE ADULT - ATTENDING COMMENTS
85yo male w/h/o dementia (baseline A&O 1-2), syncope and falls due to orthostatic hypotension c/b SDH who presents for another episode of LOC. Pt is on LEV 1gm BID for sz ppx; per daughter, pt has never had any sz in the past. Got LEV 500mg in ER. Declining in mentation over the last yr. Epilepsy consulted to eval for possible sz.    Impression:  Previous w/u for syncope concluded ortho hypotension. Low suspicion for sz. Will obtain cvEEG to risk stratify. 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    Recommendation:  - cvEEG  - decrease LEV 1gm BID to 500mg BID

## 2019-08-27 NOTE — H&P ADULT - PROBLEM SELECTOR PLAN 4
- check orthostatics  - c/w home fludrocortisone - check orthostatics  - c/w home fludrocortisone  - hold home amlodipine

## 2019-08-27 NOTE — H&P ADULT - NSICDXPASTMEDICALHX_GEN_ALL_CORE_FT
PAST MEDICAL HISTORY:  HTN (hypertension)     Orthostatic hypotension     Pacemaker Guidant. placed 1995. Dr Jovon SUTTON    Prostate ca     Seizure

## 2019-08-27 NOTE — PHYSICAL THERAPY INITIAL EVALUATION ADULT - PERTINENT HX OF CURRENT PROBLEM, REHAB EVAL
Pt is an 86 M with PMH of dementia, orthostatic hypotension, seizures, prostate CA that presents from [facility] for neurosurgical eval of SDH. CT Head 8/26: Left frontoparietal chronic SDH with mass effect on left frontal lobe causing rightward midline shift. CT Head 8/27: No gross interval change compared to previous study.

## 2019-08-27 NOTE — CONSULT NOTE ADULT - SUBJECTIVE AND OBJECTIVE BOX
HPI:    Patient is an 86M with a history of dementia, orthostatic hypotension on fludrocortisone, seizure disorder on Keppra, hx of chronic subdural hematoma who presents as a transfer from [facility] for evaluation of SDH. Patient is AOx1-2 at baseline, needs assistance with ADLs and was with his HHA at home when he suddenly passed out while he was sitting in a chair and fell and hit his head. He was taken to [facility] where he was found to have a SDH and was transferred to [facility]. Patient has a history of syncopal events in the past that were thought to be secondary to orthostatic hypotension. 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 He was previously admitted here in Dec 2018 after a fall resulting in a SDH. He has had prior subdurals due to falls in the past and was put of Keppra due to these. His daughter reported that his mental status has been declining since his last hospitalization in 2018. Neurology was consulted to rule-out seizure as the cause of his fall.         MEDICATIONS  (STANDING):  atorvastatin 40 milliGRAM(s) Oral at bedtime  fludroCORTISONE 0.1 milliGRAM(s) Oral daily  levETIRAcetam  IVPB 1000 milliGRAM(s) IV Intermittent every 12 hours    MEDICATIONS  (PRN):    PAST MEDICAL & SURGICAL HISTORY:  Seizure  Orthostatic hypotension  Pacemaker: Guidant. placed 1995. Dr Jovon SUTTON  HTN (hypertension)  Prostate ca  Pacemaker    FAMILY HISTORY:  No pertinent family history in first degree relatives    Allergies    No Known Allergies      Vital Signs Last 24 Hrs  T(C): 36.9 (27 Aug 2019 06:00), Max: 37.1 (26 Aug 2019 20:30)  T(F): 98.4 (27 Aug 2019 06:00), Max: 98.7 (26 Aug 2019 20:30)  HR: 65 (27 Aug 2019 06:00) (61 - 84)  BP: 160/79 (27 Aug 2019 06:00) (106/66 - 170/92)  BP(mean): 104 (26 Aug 2019 22:17) (78 - 104)  RR: 18 (27 Aug 2019 06:00) (15 - 19)  SpO2: 98% (27 Aug 2019 06:00) (96% - 100%)    General appearance: No acute distress  Neurological Exam:  Mental Status: Awake, eyes closed but opens on command. Follows simple commands.  Mild dysarthria but minimal verbal output.   Cranial Nerves: PERRL, EOMI.  Motor:   Tone: normal.                  Strength:   does not cooperate with full motor testing but moves all extremities; upper extremities 4+/5 bilaterally; lower extremities withdraw equally to pain but does not cooperate otherwise  Tremor: resting tremor (chronic)  Sensation: intact to light touch throughout, no extinction  Plantar reflexes downgoing     08-26    140  |  103  |  26<H>  ----------------------------<  102<H>  5.7<H>   |  24  |  1.49<H>    Ca    10.6<H>      26 Aug 2019 17:10    TPro  7.4  /  Alb  4.2  /  TBili  0.8  /  DBili  x   /  AST  31  /  ALT  13  /  AlkPhos  123<H>  08-26                            12.3   4.7   )-----------( 156      ( 26 Aug 2019 17:10 )             38.2       Radiology    < from: CT Head No Cont (08.27.19 @ 03:07) >  IMPRESSION:  No gross interval change compared with the prior study in   somewhat mixed attenuation extra-axial collection with some component of   loculation. Subtle mass effect on the left lateral ventricle with subtle   shift of the midline to the right.    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    < end of copied text > HPI:    Patient is an 86M with a history of dementia, orthostatic hypotension on fludrocortisone, seizure disorder on Keppra, hx of chronic subdural hematoma who presents as a transfer from [facility] for evaluation of SDH. Patient is AOx1-2 at baseline, needs assistance with ADLs and was with his HHA at home when he suddenly passed out while he was sitting in a chair and become altered and unresponsive, but did not fall or hit his head. He was taken to [facility] where he was found to have a SDH and was transferred to [facility]. Patient has a history of syncopal events in the past that were thought to be secondary to orthostatic hypotension. 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 He was previously admitted here in Dec 2018 after a fall resulting in a SDH. He has had prior subdurals due to falls in the past and was put of Keppra due to these. His daughter reported that his mental status has been declining since his last hospitalization in 2018. Neurology was consulted to rule-out seizure as the cause of his fall.         MEDICATIONS  (STANDING):  atorvastatin 40 milliGRAM(s) Oral at bedtime  fludroCORTISONE 0.1 milliGRAM(s) Oral daily  levETIRAcetam  IVPB 1000 milliGRAM(s) IV Intermittent every 12 hours    MEDICATIONS  (PRN):    PAST MEDICAL & SURGICAL HISTORY:  Seizure  Orthostatic hypotension  Pacemaker: Guidant. placed 1995. Dr Jovon SUTTON  HTN (hypertension)  Prostate ca  Pacemaker    FAMILY HISTORY:  No pertinent family history in first degree relatives    Allergies    No Known Allergies      Vital Signs Last 24 Hrs  T(C): 36.9 (27 Aug 2019 06:00), Max: 37.1 (26 Aug 2019 20:30)  T(F): 98.4 (27 Aug 2019 06:00), Max: 98.7 (26 Aug 2019 20:30)  HR: 65 (27 Aug 2019 06:00) (61 - 84)  BP: 160/79 (27 Aug 2019 06:00) (106/66 - 170/92)  BP(mean): 104 (26 Aug 2019 22:17) (78 - 104)  RR: 18 (27 Aug 2019 06:00) (15 - 19)  SpO2: 98% (27 Aug 2019 06:00) (96% - 100%)    General appearance: No acute distress  Neurological Exam:  Mental Status: Awake, eyes closed but opens on command. Follows simple commands.  Mild dysarthria but minimal verbal output.   Cranial Nerves: PERRL, EOMI.  Motor:   Tone: normal.                  Strength:   does not cooperate with full motor testing but moves all extremities; upper extremities 4+/5 bilaterally; lower extremities withdraw equally to pain but does not cooperate otherwise  Tremor: resting tremor (chronic)  Sensation: intact to light touch throughout, no extinction  Plantar reflexes downgoing     08-26    140  |  103  |  26<H>  ----------------------------<  102<H>  5.7<H>   |  24  |  1.49<H>    Ca    10.6<H>      26 Aug 2019 17:10    TPro  7.4  /  Alb  4.2  /  TBili  0.8  /  DBili  x   /  AST  31  /  ALT  13  /  AlkPhos  123<H>  08-26                            12.3   4.7   )-----------( 156      ( 26 Aug 2019 17:10 )             38.2       Radiology    < from: CT Head No Cont (08.27.19 @ 03:07) >  IMPRESSION:  No gross interval change compared with the prior study in   somewhat mixed attenuation extra-axial collection with some component of   loculation. Subtle mass effect on the left lateral ventricle with subtle   shift of the midline to the right.    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    < end of copied text >

## 2019-08-27 NOTE — H&P ADULT - HISTORY OF PRESENT ILLNESS
87 y/o M with PMHx of dementia (AAOx1-2 at baseline), orthostatic hypotension (on fludrocortisone), seizures, prostate Ca, who presents as a transfer from [facility] for neurosurgical eval after SDH found on imaging. Patient is AAOx1 and unable to provide history. History obtained from daughter at bedside. As per daughter, patient had an episode of unresponsiveness lasting 30 minutes. Patient was accompanied by HHA at the time, who was assisting patient with cleaning when patient passed out. Patient was sitting down at the time. No seizure-like activity while pt was out. Pt did not complain of CP, SOB, palpitations, or change in vision prior to LOC. EMS was called and patient regained consciousness in ED 30 mins later. Patient has hx of syncopal episodes when standing up, which was thought to be due to orthostatic hypotension. Daughter reports that patient frequently has these episodes, but was told by her PCP not to call EMS unless pt remained out for prolonged period of time. Currently on fludrocortisone, which he takes as directed. Daughter reports pt has been eating and drinking well, and was at his baseline prior to the incident. No diarrhea, dysuria, abdominal pain, or N/V. Daughter reports that patient's mental/functional status has been declining since his hospital admission in 12/2018, when he had presented following a fall and found to have SDH. At current baseline, patient is AAOx1-2, requires assistance to ambulate and needs assistance with all ADLs. Lives with daughter and has HHA during the day.     In the [facility] ED:   Vitals: afebrile, HR 82, /85, spO2 % on RA  Labs: Significant for Cr 1.49 (BL 1.4)  Imaging: CT Head showed left frontoparietal chronic subdural hematoma measuring 1.6 cm in maximal thickness, exerting mass effect on the left frontal lobe as well as causing 6 mm of rightward midline shift.  EKG - NSR, no ST-T wave changes, QTc 413  Given: Keppra 500mg IV 87 y/o M with PMHx of dementia (AAOx1-2 at baseline), orthostatic hypotension (on fludrocortisone), seizures, prostate Ca, who presents as a transfer from [facility] for neurosurgical eval after SDH found on imaging. Patient is AAOx1 and unable to provide history. History obtained from daughter at bedside. As per daughter, patient had an episode of unresponsiveness lasting 30 minutes. Patient was accompanied by HHA at the time, who was assisting patient with cleaning when patient passed out. Patient was sitting down at the time. No seizure-like activity while pt was out. Pt did not complain of CP, SOB, palpitations, or change in vision prior to LOC. EMS was called and patient regained consciousness in ED 30 mins later. Patient has hx of syncopal episodes when standing up, which was thought to be due to orthostatic hypotension. Daughter reports that patient frequently has these episodes, but was told by her PCP not to call EMS unless pt remained out for prolonged period of time. Currently on fludrocortisone, which he takes as directed. Pt has reported hx of seizures diagnosed at OSH, for which he has been on Keppra for years, but daughter does not recall pt ever having seizure-like activity. Pt has been eating and drinking well, and was at his baseline prior to the incident. No diarrhea, dysuria, abdominal pain, or N/V. Daughter reports that patient's mental/functional status has been declining since his hospital admission in 12/2018, when he had presented following a fall and found to have SDH. At current baseline, patient is AAOx1-2, requires assistance to ambulate and needs assistance with all ADLs. Lives with daughter and has HHA during the day.     In the [facility] ED:   Vitals: afebrile, HR 82, /85, spO2 % on RA  Labs: Significant for Cr 1.49 (BL 1.4)  Imaging: CT Head showed left frontoparietal chronic subdural hematoma measuring 1.6 cm in maximal thickness, exerting mass effect on the left frontal lobe as well as causing 6 mm of rightward midline shift.  EKG - NSR, no ST-T wave changes, QTc 413  Given: Keppra 500mg IV

## 2019-08-27 NOTE — CONSULT NOTE ADULT - ASSESSMENT
Patient is an 86M with a history of dementia, orthostatic hypotension on fludrocortisone, seizure disorder on Keppra, hx of chronic subdural hematoma who presents as a transfer from [facility] for evaluation of SDH, which was found to be chronic/stable in nature. Neurology was consulted to rule-out seizure as the cause of his fall.     CTH showed a L frontoparietal chronic SDH 1.6 cm in maximal thickness with mass effect on the L frontal lobe and 6 mm rightward of midline shift. He was seen by neurosurgery who did not recommend any intervention as the hematoma appears chronic. He was given an additional 500 mg IV Keppra in the ED.     On exam, patient follows some simple commands but only says a couple words and does not fully participate in exam.     Recommendations:  Continue Keppra 1000 mg BID  24 hours VEEG to rule out subclinical seizures  Toxic/metabolic work-up  Recommendations regarding SDH per neurosurgery team Patient is an 86M with a history of dementia, orthostatic hypotension on fludrocortisone, seizure disorder on Keppra, hx of chronic subdural hematoma who presents as a transfer from [facility] for evaluation of SDH, which was found to be chronic/stable in nature. Neurology was consulted to rule-out seizure as the cause of his fall.     CTH showed a L frontoparietal chronic SDH 1.6 cm in maximal thickness with mass effect on the L frontal lobe and 6 mm rightward of midline shift. He was seen by neurosurgery who did not recommend any intervention as the hematoma appears chronic. He was given an additional 500 mg IV Keppra in the ED.     On exam, patient follows some simple commands but only says a couple words and does not fully participate in exam.     Spoke to his daughter over the phone who says that his Keppra was started for seizure prophylaxis and he has clinically never had any witnessed seizures.     Recommendations:  Continue Keppra 1000 mg BID  May consider lowering Keppra as patient clinically never had seizures; will decide after EEG  24 hours VEEG to rule out subclinical seizures  Toxic/metabolic work-up  Recommendations regarding SDH per neurosurgery team Patient is an 86M with a history of dementia, orthostatic hypotension on fludrocortisone, seizure disorder on Keppra, hx of chronic subdural hematoma who presents as a transfer from [facility] for evaluation of SDH, which was found to be chronic/stable in nature. Neurology was consulted to rule-out seizure as the cause of his fall.     CTH showed a L frontoparietal chronic SDH 1.6 cm in maximal thickness with mass effect on the L frontal lobe and 6 mm rightward of midline shift. He was seen by neurosurgery who did not recommend any intervention as the hematoma appears chronic. He was given an additional 500 mg IV Keppra in the ED.     On exam, patient follows some simple commands but only says a couple words and does not fully participate in exam.     Spoke to his daughter over the phone who says that his Keppra was started for seizure prophylaxis and he has clinically never had any witnessed seizures.     Recommendations:  Lower dose of Keppra to 500 mg BID as patient clinically has not had any seizures and was on relatively high dose for prophylaxis at his age  24 hours VEEG to rule out subclinical seizures  Toxic/metabolic work-up  Recommendations regarding SDH per neurosurgery team

## 2019-08-27 NOTE — PROGRESS NOTE ADULT - PROBLEM SELECTOR PLAN 1
Presented following episode of LOC lasting 30 mins? Differential for syncopal episode includes orthostasis, seizure, vasovagal.  Known hx of orthostatic hypotension, however patient was sitting at time of event. Low suspicion for primary cardiac etiology given no hx of CP or EKG changes. SDH noted on imaging appears chronic and less likely to be cause of acute episode. No evidence of seizure-like activity, although patient is altered greater than baseline, possibly representing post-ictal state.  - routine EEG to assess for seizure activity  - monitor on tele   - EP consult in AM for PPM interrogation (Medudem L321 ACCOLADE EL)  - check TTE for valvular disease  - check orthostatics, likely to be positive in patient with known orthostatic hypotension  - c/w Keppra 500mg BID IV; will check level  - monitor off midodrine and amlodipine for now (unclear why pt on amlodipine and midodrine at home)

## 2019-08-27 NOTE — H&P ADULT - PROBLEM SELECTOR PLAN 1
Presented following episode of LOC lasting 30 mins. Differential for syncopal episode includes orthostasis, seizure, vasovagal, vs cardiogenic. Known hx of orthostatic hypotension, however patient was sitting at time of event. Low suspicion for primary cardiac etiology given no hx of CP or EKG changes. SDH noted on imaging appears chronic and less likely to be cause of acute episode. No evidence of seizure-like activity, although patient is altered greater than baseline, possibly representing post-ictal state.  - routine EEG to assess for seizure activity  - monitor on tele   - check TTE for valvular disease  - check orthostatics, likely to be positive in patient with known orthostatic hypotension  - c/w Keppra 1000mg BID IV Presented following episode of LOC lasting 30 mins. Differential for syncopal episode includes orthostasis, seizure, vasovagal, vs cardiogenic. Known hx of orthostatic hypotension, however patient was sitting at time of event. Low suspicion for primary cardiac etiology given no hx of CP or EKG changes. SDH noted on imaging appears chronic and less likely to be cause of acute episode. No evidence of seizure-like activity, although patient is altered greater than baseline, possibly representing post-ictal state.  - routine EEG to assess for seizure activity  - monitor on tele   - EP consult in AM for PPM interrogation ([employer] L321 ACCOLADE EL)  - check TTE for valvular disease  - check orthostatics, likely to be positive in patient with known orthostatic hypotension  - c/w Keppra 1000mg BID IV Presented following episode of LOC lasting 30 mins. Differential for syncopal episode includes orthostasis, seizure, vasovagal, vs cardiogenic. Known hx of orthostatic hypotension, however patient was sitting at time of event. Low suspicion for primary cardiac etiology given no hx of CP or EKG changes. SDH noted on imaging appears chronic and less likely to be cause of acute episode. No evidence of seizure-like activity, although patient is altered greater than baseline, possibly representing post-ictal state.  - routine EEG to assess for seizure activity  - monitor on tele   - EP consult in AM for PPM interrogation ([employer] L321 ACCOLADE EL)  - check TTE for valvular disease  - check orthostatics, likely to be positive in patient with known orthostatic hypotension  - c/w Keppra 1000mg BID IV  - monitor off midodrine for now (unclear why pt on amlodipine and midodrine at home) Presented following episode of LOC lasting 30 mins. Differential for syncopal episode includes orthostasis, seizure, vasovagal, vs cardiogenic. Known hx of orthostatic hypotension, however patient was sitting at time of event. Low suspicion for primary cardiac etiology given no hx of CP or EKG changes. SDH noted on imaging appears chronic and less likely to be cause of acute episode. No evidence of seizure-like activity, although patient is altered greater than baseline, possibly representing post-ictal state.  - routine EEG to assess for seizure activity  - monitor on tele   - EP consult in AM for PPM interrogation ([employer] L321 ACCOLADE EL)  - check TTE for valvular disease  - check orthostatics, likely to be positive in patient with known orthostatic hypotension  - c/w Keppra 1000mg BID IV; will check level  - monitor off midodrine and amlodipine for now (unclear why pt on amlodipine and midodrine at home)

## 2019-08-27 NOTE — PHYSICAL THERAPY INITIAL EVALUATION ADULT - ADDITIONAL COMMENTS
Pt A&Ox0. As per care manager notes, pt lives with wife in private house. 4 steps to enter and 16 steps to bedroom. Pt A&Ox0. As per care manager notes, pt lives with wife in private house. 4 steps to enter and 16 steps to bedroom. HHA 5 hours a day 5 days and has pt lift device Pt A&Ox0. As per care manager notes, pt lives with wife in private house. 4 steps to enter and 16 steps to bedroom. HHA 5 hours a day 5 days and has pt has hospital bed, w/c and stair lift at home.

## 2019-08-27 NOTE — H&P ADULT - NSHPPHYSICALEXAM_GEN_ALL_CORE
Vital Signs (24 Hrs):  T(C): 37.1 (08-26-19 @ 20:30), Max: 37.1 (08-26-19 @ 20:30)  HR: 63 (08-26-19 @ 22:29) (63 - 84)  BP: 157/84 (08-26-19 @ 22:29) (106/66 - 170/92)  RR: 16 (08-26-19 @ 22:29) (15 - 19)  SpO2: 100% (08-26-19 @ 22:29) (96% - 100%)  Wt(kg): --  Daily     Daily     I&O's Summary    PHYSICAL EXAM:  GENERAL: asleep but arousable, NAD  HEAD:  Atraumatic, normocephalic  EYES: unable to assess EOM fully, PERRL, conjunctiva and sclera clear  ENMT: No tonsillar erythema, exudates, or enlargement; moist mucous membranes  NECK: Supple, no JVD, normal thyroid  CHEST/LUNG: Clear to auscultation bilaterally; No rales, rhonchi, wheezing, or rubs  HEART: Regular rate and rhythm; no murmurs, rubs, or gallops  ABDOMEN: Soft, nontender, nondistended; bowel sounds present in all 4 quadrants  EXTREMITIES:  No clubbing, cyanosis, or edema  SKIN: No rashes or lesions  NERVOUS SYSTEM:  Alert & Oriented X1 (oriented to self), DOMINIQUE spontaneously, no facial droop, strength proximally/distally intact in UEs, unable to assess LEs

## 2019-08-27 NOTE — H&P ADULT - ASSESSMENT
85 y/o M with PMHx of dementia (AAOx1-2 at baseline), orthostatic hypotension (on fludrocortisone), seizures, prostate Ca, who presents as a transfer from [facility] for neurosurgical eval after SDH found on imaging. 85 y/o M with PMHx of dementia (AAOx1-2 at baseline), orthostatic hypotension (on fludrocortisone), seizures, prostate Ca, who presents as a transfer from [facility] for neurosurgical eval after SDH found on imaging, admitted for syncopal evaluation.

## 2019-08-27 NOTE — H&P ADULT - PROBLEM SELECTOR PLAN 2
CT Head showing left frontoparietal chronic subdural hematoma, w/ mass effect on the left frontal lobe and rightward midline shift.  - Evaluated by NSG, no acute surgical interventions  - Hold off on steroids at this given no evidence of edema  - Neuro checks q4h  - c/w Keppra  - Fall precautions  - Hold ASA and DVT prophylaxis CT Head showing left frontoparietal chronic subdural hematoma, w/ mass effect on the left frontal lobe and rightward midline shift.  - Evaluated by NSG, no acute surgical interventions. f/u w/ NSG re: obtaining f/u CT Head  - Hold off on steroids at this given no evidence of edema  - Neuro checks q4h  - c/w Keppra  - Fall precautions  - Hold ASA and DVT prophylaxis CT Head showing left frontoparietal chronic subdural hematoma, w/ mass effect on the left frontal lobe and rightward midline shift.  Will repeat CT scan  - Evaluated by NSG, no acute surgical interventions.   - Hold off on steroids at this given no evidence of edema  - Neuro checks q4h  - c/w Keppra  - Fall precautions and aspiration precaution  - Hold ASA and DVT prophylaxis  - neurology consult

## 2019-08-28 DIAGNOSIS — E83.52 HYPERCALCEMIA: ICD-10-CM

## 2019-08-28 DIAGNOSIS — N17.9 ACUTE KIDNEY FAILURE, UNSPECIFIED: ICD-10-CM

## 2019-08-28 LAB
ANION GAP SERPL CALC-SCNC: 15 MMOL/L — SIGNIFICANT CHANGE UP (ref 5–17)
BUN SERPL-MCNC: 24 MG/DL — HIGH (ref 7–23)
CALCIUM SERPL-MCNC: 10.9 MG/DL — HIGH (ref 8.4–10.5)
CHLORIDE SERPL-SCNC: 102 MMOL/L — SIGNIFICANT CHANGE UP (ref 96–108)
CO2 SERPL-SCNC: 27 MMOL/L — SIGNIFICANT CHANGE UP (ref 22–31)
CREAT SERPL-MCNC: 1.19 MG/DL — SIGNIFICANT CHANGE UP (ref 0.5–1.3)
GLUCOSE SERPL-MCNC: 95 MG/DL — SIGNIFICANT CHANGE UP (ref 70–99)
HCT VFR BLD CALC: 31.9 % — LOW (ref 39–50)
HGB BLD-MCNC: 10.7 G/DL — LOW (ref 13–17)
LEVETIRACETAM SERPL-MCNC: 27.4 MCG/ML — SIGNIFICANT CHANGE UP (ref 12–46)
MAGNESIUM SERPL-MCNC: 2.3 MG/DL — SIGNIFICANT CHANGE UP (ref 1.6–2.6)
MCHC RBC-ENTMCNC: 33.5 GM/DL — SIGNIFICANT CHANGE UP (ref 32–36)
MCHC RBC-ENTMCNC: 34.3 PG — HIGH (ref 27–34)
MCV RBC AUTO: 102 FL — HIGH (ref 80–100)
PHOSPHATE SERPL-MCNC: 3 MG/DL — SIGNIFICANT CHANGE UP (ref 2.5–4.5)
PLATELET # BLD AUTO: 121 K/UL — LOW (ref 150–400)
POTASSIUM SERPL-MCNC: 4.4 MMOL/L — SIGNIFICANT CHANGE UP (ref 3.5–5.3)
POTASSIUM SERPL-SCNC: 4.4 MMOL/L — SIGNIFICANT CHANGE UP (ref 3.5–5.3)
RBC # BLD: 3.12 M/UL — LOW (ref 4.2–5.8)
RBC # FLD: 11.8 % — SIGNIFICANT CHANGE UP (ref 10.3–14.5)
SODIUM SERPL-SCNC: 144 MMOL/L — SIGNIFICANT CHANGE UP (ref 135–145)
WBC # BLD: 4.9 K/UL — SIGNIFICANT CHANGE UP (ref 3.8–10.5)
WBC # FLD AUTO: 4.9 K/UL — SIGNIFICANT CHANGE UP (ref 3.8–10.5)

## 2019-08-28 PROCEDURE — 95816 EEG AWAKE AND DROWSY: CPT | Mod: 26

## 2019-08-28 PROCEDURE — 99233 SBSQ HOSP IP/OBS HIGH 50: CPT

## 2019-08-28 PROCEDURE — 95951: CPT | Mod: 26

## 2019-08-28 PROCEDURE — 93280 PM DEVICE PROGR EVAL DUAL: CPT | Mod: 26

## 2019-08-28 RX ORDER — SODIUM CHLORIDE 9 MG/ML
1000 INJECTION INTRAMUSCULAR; INTRAVENOUS; SUBCUTANEOUS
Refills: 0 | Status: COMPLETED | OUTPATIENT
Start: 2019-08-28 | End: 2019-08-28

## 2019-08-28 RX ADMIN — LEVETIRACETAM 400 MILLIGRAM(S): 250 TABLET, FILM COATED ORAL at 05:09

## 2019-08-28 RX ADMIN — SODIUM CHLORIDE 60 MILLILITER(S): 9 INJECTION INTRAMUSCULAR; INTRAVENOUS; SUBCUTANEOUS at 15:45

## 2019-08-28 RX ADMIN — LEVETIRACETAM 400 MILLIGRAM(S): 250 TABLET, FILM COATED ORAL at 17:23

## 2019-08-28 NOTE — PROGRESS NOTE ADULT - PROBLEM SELECTOR PLAN 1
Presented following episode of LOC lasting 30 mins? Differential for syncopal episode includes orthostasis, vasovagal.  Known hx of orthostatic hypotension, however patient was sitting at time of event. Low suspicion for primary cardiac etiology given no hx of CP or EKG changes. SDH noted on imaging appears chronic and less likely to be cause of acute episode.  - EEG 1. Moderate nonspecific diffuse or multifocal cerebral dysfunction.   2. No epileptiform pattern or seizure see  - Echocardiogram reveals no structural abnormalities   - EP consult for PPM interrogation ([employer] L321 ACCOLADE EL)  - check orthostatics, likely to be positive in patient with known orthostatic hypotension  - c/w Keppra 500mg BID IV; will check level  - monitor off midodrine and amlodipine for now (unclear why pt on amlodipine and midodrine at home)

## 2019-08-28 NOTE — PROGRESS NOTE ADULT - PROBLEM SELECTOR PLAN 10
DVT Prophylaxis: SCDs  Diet: NPO  Dispo: Pending PT need to investigate cause of hypercalcemia.  Will order PTH, PTRPH, and Vit D levels.  given anemia and renal failure will order SPEP UPEP

## 2019-08-28 NOTE — PROCEDURE NOTE - ADDITIONAL PROCEDURE DETAILS
Indication for interrogation: syncopal episode  pt is not ppm dependent  underlying A-sensed and V-sensed  normal sensing and pacing thresholds.  Lead impedence stable.  No events stored for review.  No events to correlate with syncopal episode  Normal PPM function.

## 2019-08-28 NOTE — PROGRESS NOTE ADULT - PROBLEM SELECTOR PLAN 2
Left frontoparietal chronic subdural hematoma, w/ mass effect on the left frontal lobe and rightward midline shift.  Repeat CT scan stable  - Evaluated by NSG, no acute surgical interventions.   - Neuro checks q4h  - c/w Keppra  - Fall precautions and aspiration precaution  - Hold ASA and DVT prophylaxis  - neurology consult appreciated

## 2019-08-28 NOTE — EEG REPORT - NS EEG TEXT BOX
[facility]   COMPREHENSIVE EPILEPSY CENTER   REPORT OF ROUTINE VIDEO EEG     [facility]: [address], [place] [address], Ph#: [phone]  [facility]: [address], [place] [address], Ph#: [phone]  [facility]: [address] [place] [address]    Patient Name: YUMIKO CONTRERAS  Age and : 86y ([birth year])  MRN #: [id number]  Location: [facility] 4MON 403 W1  Referring Physician: Dewey Verdin    Study Date: 19    _____________________________________________________________  TECHNICAL INFORMATION    Placement and Labeling of Electrodes:  The EEG was performed utilizing 20 channels referential EEG connections (coronal over temporal over parasagittal montage) using all standard 10-20 electrode placements with EKG.  Recording was at a sampling rate of 256 samples per second per channel.  Time synchronized digital video recording was done simultaneously with EEG recording.  A low light infrared camera was used for low light recording.  Leoncio and seizure detection algorithms were utilized.    _____________________________________________________________  HISTORY    Patient is a 86y old  Male who presents with a chief complaint of unresponsiveness (27 Aug 2019 14:02)      PERTINENT MEDICATION:  levETIRAcetam  IVPB 500 milliGRAM(s) IV Intermittent every 12 hours    _____________________________________________________________  STUDY INTERPRETATION    Findings: The background was continuous, spontaneously variable and reactive. No posterior dominant rhythm seen.    Background Slowing:  Diffuse theta and polymorphic delta slowing.    Focal Slowing:   None were present.    Sleep Background:  Drowsiness was characterized by fragmentation, attenuation, and slowing of the background activity.    Sleep was characterized by the presence of symmetric, rudimentary sleep spindles and K-complexes.      Other Non-Epileptiform Findings:  None were present.    Interictal Epileptiform Activity:   None were present.    Events:  Clinical events: None recorded.  Seizures: None recorded.    Activation Procedures:   Hyperventilation was not performed.    Photic stimulation was not performed.       Artifacts:  Intermittent myogenic and movement artifacts were noted.    ECG:  The heart rate on single channel ECG was predominantly between 60-70 BPM.    _____________________________________________________________  EEG SUMMARY/CLASSIFICATION    Abnormal EEG in the awake, drowsy and asleep states.  - Moderate generalized slowing.    _____________________________________________________________  EEG IMPRESSION/CLINICAL CORRELATE    Abnormal EEG study.  1. Moderate nonspecific diffuse or multifocal cerebral dysfunction.   2. No epileptiform pattern or seizure seen.  _____________________________________________________________  Sanjay Coronado DO  Epilepsy Fellow, [facility]psy [place]    Talia Watson MD  Attending Physician, [facility] [place] [facility]   COMPREHENSIVE EPILEPSY CENTER   REPORT OF PROLONGED VIDEO EEG (6hrs and 10 minutes)    [facility]: [address], [place] [address], Ph#: [phone]  [facility]: [address], [place] [address], Ph#: [phone]  [facility]: [address] [place] [address]    Patient Name: YUMIKO CONTRERAS  Age and : 86y ([birth year])  MRN #: [id number]  Location: [facility] 4MON 403 W1  Referring Physician: Dewey Verdin    Study Date: 19 (6hrs and 10 minutes)    _____________________________________________________________  TECHNICAL INFORMATION    Placement and Labeling of Electrodes:  The EEG was performed utilizing 20 channels referential EEG connections (coronal over temporal over parasagittal montage) using all standard 10-20 electrode placements with EKG.  Recording was at a sampling rate of 256 samples per second per channel.  Time synchronized digital video recording was done simultaneously with EEG recording.  A low light infrared camera was used for low light recording.  Leoncio and seizure detection algorithms were utilized.    _____________________________________________________________  HISTORY    Patient is a 86y old  Male who presents with a chief complaint of unresponsiveness (27 Aug 2019 14:02)      PERTINENT MEDICATION:  levETIRAcetam  IVPB 500 milliGRAM(s) IV Intermittent every 12 hours    _____________________________________________________________  STUDY INTERPRETATION    Findings: The background was continuous, spontaneously variable and reactive. No posterior dominant rhythm seen.    Background Slowing:  Diffuse theta and polymorphic delta slowing.    Focal Slowing:   None were present.    Sleep Background:  Drowsiness was characterized by fragmentation, attenuation, and slowing of the background activity.    Sleep was characterized by the presence of symmetric, rudimentary sleep spindles and K-complexes.      Other Non-Epileptiform Findings:  None were present.    Interictal Epileptiform Activity:   None were present.    Events:  Clinical events: None recorded.  Seizures: None recorded.    Activation Procedures:   Hyperventilation was not performed.    Photic stimulation was not performed.       Artifacts:  Intermittent myogenic and movement artifacts were noted.    ECG:  The heart rate on single channel ECG was predominantly between 60-70 BPM.    _____________________________________________________________  EEG SUMMARY/CLASSIFICATION    Abnormal EEG in the awake, drowsy and asleep states.  - Moderate generalized slowing.    _____________________________________________________________  EEG IMPRESSION/CLINICAL CORRELATE    Abnormal EEG study.  1. Moderate nonspecific diffuse or multifocal cerebral dysfunction.   2. No epileptiform pattern or seizure seen.    Prolonged 6hr 10min study  _____________________________________________________________  Sanjay Coronado DO  Epilepsy Fellow, [facility]psy [place]    Talia Watson MD  Attending Physician, [facility] [place] [facility]   COMPREHENSIVE EPILEPSY CENTER   REPORT OF PROLONGED VIDEO EEG (6hrs and 10 minutes)    [facility]: [address], [place] [address], Ph#: [phone]  [facility]: [address], [place] [address], Ph#: [phone]  [facility]: [address] [place] [address]    Patient Name: YUMIKO CONTRERAS  Age and : 86y ([birth year])  MRN #: [id number]  Location: [facility] 4MON 403 W1  Referring Physician: Dewey Verdin    Study Date: 19 (6hrs and 10 minutes)    _____________________________________________________________  TECHNICAL INFORMATION    Placement and Labeling of Electrodes:  The EEG was performed utilizing 20 channels referential EEG connections (coronal over temporal over parasagittal montage) using all standard 10-20 electrode placements with EKG.  Recording was at a sampling rate of 256 samples per second per channel.  Time synchronized digital video recording was done simultaneously with EEG recording.  A low light infrared camera was used for low light recording.  Leoncio and seizure detection algorithms were utilized.    _____________________________________________________________  HISTORY    Patient is a 86y old  Male who presents with a chief complaint of unresponsiveness (27 Aug 2019 14:02)      PERTINENT MEDICATION:  levETIRAcetam  IVPB 500 milliGRAM(s) IV Intermittent every 12 hours    _____________________________________________________________  STUDY INTERPRETATION    Findings: The background was continuous, spontaneously variable and reactive. No posterior dominant rhythm seen.    Background Slowing:  Diffuse theta and polymorphic delta slowing.    Focal Slowing:   None were present.    Sleep Background:  Drowsiness was characterized by fragmentation, attenuation, and slowing of the background activity.    Sleep was characterized by the presence of symmetric, rudimentary sleep spindles and K-complexes.      Other Non-Epileptiform Findings:  None were present.    Interictal Epileptiform Activity:   None were present.    Events:  Clinical events: None recorded.  Seizures: None recorded.    Activation Procedures:   Hyperventilation was not performed.    Photic stimulation was not performed.       Artifacts:  Intermittent myogenic and movement artifacts were noted.    ECG:  The heart rate on single channel ECG was predominantly between 60-70 BPM.    _____________________________________________________________  EEG SUMMARY/CLASSIFICATION    Abnormal EEG in the awake, drowsy and asleep states.  - Moderate generalized slowing.    _____________________________________________________________  EEG IMPRESSION/CLINICAL CORRELATE    This is an approximately 6hr 10min study.    Abnormal EEG study.  1. Moderate nonspecific diffuse or multifocal cerebral dysfunction.   2. No epileptiform pattern or seizure seen.    _____________________________________________________________  Sanjay Coronado DO  Epilepsy Fellow, [facility] Epilepsy [place]    Talia Watson MD  Attending Physician, [facility] Epilepsy [place]

## 2019-08-29 LAB
24R-OH-CALCIDIOL SERPL-MCNC: 12 NG/ML — LOW (ref 30–80)
ANION GAP SERPL CALC-SCNC: 15 MMOL/L — SIGNIFICANT CHANGE UP (ref 5–17)
BUN SERPL-MCNC: 29 MG/DL — HIGH (ref 7–23)
CALCIUM SERPL-MCNC: 10.4 MG/DL — SIGNIFICANT CHANGE UP (ref 8.4–10.5)
CALCIUM SERPL-MCNC: 10.5 MG/DL — SIGNIFICANT CHANGE UP (ref 8.4–10.5)
CHLORIDE SERPL-SCNC: 103 MMOL/L — SIGNIFICANT CHANGE UP (ref 96–108)
CO2 SERPL-SCNC: 20 MMOL/L — LOW (ref 22–31)
CREAT SERPL-MCNC: 1.23 MG/DL — SIGNIFICANT CHANGE UP (ref 0.5–1.3)
GLUCOSE SERPL-MCNC: 85 MG/DL — SIGNIFICANT CHANGE UP (ref 70–99)
HCT VFR BLD CALC: 30.2 % — LOW (ref 39–50)
HGB BLD-MCNC: 9.7 G/DL — LOW (ref 13–17)
MAGNESIUM SERPL-MCNC: 2.2 MG/DL — SIGNIFICANT CHANGE UP (ref 1.6–2.6)
MCHC RBC-ENTMCNC: 32.1 GM/DL — SIGNIFICANT CHANGE UP (ref 32–36)
MCHC RBC-ENTMCNC: 33.6 PG — SIGNIFICANT CHANGE UP (ref 27–34)
MCV RBC AUTO: 104.5 FL — HIGH (ref 80–100)
PHOSPHATE SERPL-MCNC: 2.9 MG/DL — SIGNIFICANT CHANGE UP (ref 2.5–4.5)
PLATELET # BLD AUTO: 167 K/UL — SIGNIFICANT CHANGE UP (ref 150–400)
POTASSIUM SERPL-MCNC: 4 MMOL/L — SIGNIFICANT CHANGE UP (ref 3.5–5.3)
POTASSIUM SERPL-SCNC: 4 MMOL/L — SIGNIFICANT CHANGE UP (ref 3.5–5.3)
PTH-INTACT FLD-MCNC: 50 PG/ML — SIGNIFICANT CHANGE UP (ref 15–65)
RBC # BLD: 2.89 M/UL — LOW (ref 4.2–5.8)
RBC # FLD: 12.7 % — SIGNIFICANT CHANGE UP (ref 10.3–14.5)
SODIUM SERPL-SCNC: 138 MMOL/L — SIGNIFICANT CHANGE UP (ref 135–145)
VIT D25+D1,25 OH+D1,25 PNL SERPL-MCNC: 17.6 PG/ML — LOW (ref 19.9–79.3)
WBC # BLD: 4.1 K/UL — SIGNIFICANT CHANGE UP (ref 3.8–10.5)
WBC # FLD AUTO: 4.1 K/UL — SIGNIFICANT CHANGE UP (ref 3.8–10.5)

## 2019-08-29 PROCEDURE — 99233 SBSQ HOSP IP/OBS HIGH 50: CPT

## 2019-08-29 PROCEDURE — 95951: CPT | Mod: 26,52

## 2019-08-29 RX ORDER — LACOSAMIDE 50 MG/1
100 TABLET ORAL ONCE
Refills: 0 | Status: DISCONTINUED | OUTPATIENT
Start: 2019-08-29 | End: 2019-08-29

## 2019-08-29 RX ORDER — LACOSAMIDE 50 MG/1
50 TABLET ORAL
Refills: 0 | Status: DISCONTINUED | OUTPATIENT
Start: 2019-08-29 | End: 2019-09-05

## 2019-08-29 RX ORDER — LEVETIRACETAM 250 MG/1
250 TABLET, FILM COATED ORAL EVERY 12 HOURS
Refills: 0 | Status: DISCONTINUED | OUTPATIENT
Start: 2019-08-29 | End: 2019-08-29

## 2019-08-29 RX ORDER — SODIUM CHLORIDE 9 MG/ML
1000 INJECTION, SOLUTION INTRAVENOUS
Refills: 0 | Status: DISCONTINUED | OUTPATIENT
Start: 2019-08-29 | End: 2019-08-30

## 2019-08-29 RX ORDER — LEVETIRACETAM 250 MG/1
250 TABLET, FILM COATED ORAL EVERY 12 HOURS
Refills: 0 | Status: COMPLETED | OUTPATIENT
Start: 2019-08-29 | End: 2019-08-30

## 2019-08-29 RX ADMIN — LACOSAMIDE 120 MILLIGRAM(S): 50 TABLET ORAL at 16:36

## 2019-08-29 RX ADMIN — LACOSAMIDE 50 MILLIGRAM(S): 50 TABLET ORAL at 23:31

## 2019-08-29 RX ADMIN — ATORVASTATIN CALCIUM 40 MILLIGRAM(S): 80 TABLET, FILM COATED ORAL at 23:31

## 2019-08-29 RX ADMIN — LEVETIRACETAM 400 MILLIGRAM(S): 250 TABLET, FILM COATED ORAL at 05:17

## 2019-08-29 RX ADMIN — SODIUM CHLORIDE 60 MILLILITER(S): 9 INJECTION, SOLUTION INTRAVENOUS at 10:30

## 2019-08-29 RX ADMIN — LEVETIRACETAM 400 MILLIGRAM(S): 250 TABLET, FILM COATED ORAL at 17:39

## 2019-08-29 NOTE — PROGRESS NOTE ADULT - PROBLEM SELECTOR PLAN 9
Need to discuss with family prior treatment and current status of disease process.
DVT Prophylaxis: SCDs  Diet: NPO  Dispo: Pending PT
Need to discuss with family prior treatment and current status of disease process.

## 2019-08-29 NOTE — EEG REPORT - NS EEG TEXT BOX
[facility]   COMPREHENSIVE EPILEPSY CENTER   REPORT OF Continuous VIDEO EEG     [facility]: [address], [place] [address], Ph#: [phone]  [facility]: [address], [place] [address], Ph#: [phone]  [facility]: [address] [place] [address]    Patient Name: YUMIKO CONTRERAS  Age and : 86y ([birth year])  MRN #: [id number]  Location: [place] [address]  Referring Physician: Dewey Verdin    Study Date: 19 to 19    _____________________________________________________________  HISTORY    Patient is a 86y old  Male who presents with a chief complaint of unresponsiveness (27 Aug 2019 14:02)      PERTINENT MEDICATION:  levETIRAcetam  IVPB 500 milliGRAM(s) IV Intermittent every 12 hours    _____________________________________________________________  STUDY INTERPRETATION    Findings: The background was continuous, spontaneously variable and reactive. No posterior dominant rhythm seen.    Background Slowing:  Diffuse theta and polymorphic delta slowing.    Focal Slowing:   None were present.    Sleep Background:  Drowsiness was characterized by fragmentation, attenuation, and slowing of the background activity.    Sleep was characterized by the presence of symmetric, rudimentary sleep spindles and K-complexes.      Other Non-Epileptiform Findings:  None were present.    Interictal Epileptiform Activity:   None were present.    Events:  Clinical events: None recorded.  Seizures: None recorded.    Activation Procedures:   Hyperventilation was not performed.    Photic stimulation was not performed.       Artifacts:  Intermittent myogenic and movement artifacts were noted.    ECG:  The heart rate on single channel ECG was predominantly between 60-70 BPM.    _____________________________________________________________  EEG SUMMARY/CLASSIFICATION    Abnormal EEG in the awake, drowsy and asleep states.  - Moderate generalized slowing.    _____________________________________________________________  EEG IMPRESSION/CLINICAL CORRELATE    Abnormal EEG study.  1. Moderate nonspecific diffuse or multifocal cerebral dysfunction.   2. No epileptiform pattern or seizure seen.    _____________________________________________________________  Sanjay Coronado DO  Epilepsy Fellow, [facility]    Talia Watson MD  Attending Physician, [facility]psy [place] [facility]   COMPREHENSIVE EPILEPSY CENTER   REPORT OF Continuous VIDEO EEG     [facility]: [address], [place] [address], Ph#: [phone]  [facility]: [address], [place] [address], Ph#: [phone]  [facility]: [address] [place] [address]    Patient Name: YUMIKO CONTRERAS  Age and : 86y (33)  MRN #: [id number]  Location: [place] [address]  Referring Physician: Dewey Verdin    Study Date: 19 to 19    _____________________________________________________________  HISTORY    Patient is a 86y old  Male who presents with a chief complaint of unresponsiveness (27 Aug 2019 14:02)      PERTINENT MEDICATION:  levETIRAcetam  IVPB 500 milliGRAM(s) IV Intermittent every 12 hours    _____________________________________________________________  STUDY INTERPRETATION    Findings: The background was continuous, spontaneously variable and reactive. No posterior dominant rhythm seen.    Background Slowing:  Diffuse theta and polymorphic delta slowing.    Focal Slowing:   None were present.    Sleep Background:  Drowsiness was characterized by fragmentation, attenuation, and slowing of the background activity.    Sleep was characterized by the presence of symmetric, rudimentary sleep spindles and K-complexes.    Other Non-Epileptiform Findings:  None were present.    Interictal Epileptiform Activity:   None were present.    Events:  Clinical events: None recorded.  Seizures: None recorded.    Activation Procedures:   Hyperventilation was not performed.    Photic stimulation was not performed.       Artifacts:  Intermittent myogenic and movement artifacts were noted.    ECG:  The heart rate on single channel ECG was predominantly between 60-70 BPM.    _____________________________________________________________  EEG SUMMARY/CLASSIFICATION    Abnormal EEG in the awake, drowsy and asleep states.  - Moderate generalized slowing.    _____________________________________________________________  EEG IMPRESSION/CLINICAL CORRELATE    Abnormal EEG study.  1. Moderate nonspecific diffuse or multifocal cerebral dysfunction.   2. No epileptiform pattern or seizure seen.    _____________________________________________________________  Sanjay Coronado DO  Epilepsy Fellow, [facility]    Talia Watson MD  Attending Physician, [facility] [place]

## 2019-08-29 NOTE — SWALLOW BEDSIDE ASSESSMENT ADULT - ORAL PHASE
Decreased anterior-posterior movement of the bolus 3-4 sec./Decreased anterior-posterior movement of the bolus/Delayed oral transit time Decreased anterior-posterior movement of the bolus/Delayed oral transit time/initially oral holding then improvement to 3-4 seconds with repeated trials

## 2019-08-29 NOTE — PROGRESS NOTE ADULT - PROBLEM SELECTOR PLAN 1
Presented following episode of LOC lasting 30 mins? Differential for syncopal episode includes orthostasis, vasovagal.  Known hx of orthostatic hypotension, however patient was sitting at time of event. Low suspicion for primary cardiac etiology given no hx of CP or EKG changes. SDH noted on imaging appears chronic and less likely to be cause of acute episode.  - EEG 1. Moderate nonspecific diffuse or multifocal cerebral dysfunction.   2. No epileptiform pattern or seizure see  - Echocardiogram reveals no structural abnormalities   - EP consult for PPM interrogation ([employer] L321 ACCOLADE EL)- showed no events on PPM, patient not pacemaker dependent   - check orthostatics, likely to be positive in patient with known orthostatic hypotension  - c/w Keppra 500mg BID IV  - monitor off midodrine and amlodipine for now (unclear why pt on amlodipine and midodrine at home)

## 2019-08-29 NOTE — PROGRESS NOTE ADULT - PROBLEM SELECTOR PLAN 4
- check orthostatics when patient closer to baseline.    - c/w home fludrocortisone  - hold home amlodipine

## 2019-08-29 NOTE — PROGRESS NOTE ADULT - PROBLEM SELECTOR PLAN 10
need to investigate cause of hypercalcemia.  pending PTH, PTRPH, and Vit D levels.  given anemia and renal failure will order SPEP UPEP

## 2019-08-29 NOTE — SWALLOW BEDSIDE ASSESSMENT ADULT - PHARYNGEAL PHASE
Decreased laryngeal elevation/Delayed pharyngeal swallow Delayed pharyngeal swallow/Wet vocal quality post oral intake/Decreased laryngeal elevation

## 2019-08-29 NOTE — SWALLOW BEDSIDE ASSESSMENT ADULT - COMMENTS
H&P con't:  Pt has been eating and drinking well, and was at his baseline prior to the incident. No diarrhea, dysuria, abdominal pain, or N/V. Daughter reports that patient's mental/functional status has been declining since his hospital admission in 12/2018, when he had presented following a fall and found to have SDH. At current baseline, patient is AAOx1-2, requires assistance to ambulate and needs assistance with all ADLs. Lives with daughter and has HHA during the day.   CT Brain 8/26: Left frontoparietal chronic subdural hematoma measuring 1.6 cm in maximal thickness, exerting mass effect on the left frontal lobe as well as causing 6 mm of rightward midline shift.  CT Brain 8/27: No gross interval change compared with the prior study in somewhat mixed attenuation extra-axial collection with some component of   loculation. Subtle mass effect on the left lateral ventricle with subtle shift of the midline to the right.   Neurology consulted to rule-out seizure as the cause of his fall. Previous w/u for syncope concluded ortho hypotension. Low suspicion for sz. rec: cvEEG, decrease LEV.  EEG 8/28 &8/29: Abnormal EEG study. 1. Moderate nonspecific diffuse or multifocal cerebral dysfunction. 2. No epileptiform pattern or seizure seen.  Hospitalist 8/28: Echocardiogram reveals no structural abnormalities. PPM Interrogation: Normal PPM function.      Failed dysphagia screens 8/27 at 09:49 & 20:00 Improved orientation to feeding with use of cup vs. spoon

## 2019-08-29 NOTE — CHART NOTE - NSCHARTNOTEFT_GEN_A_CORE
Called by neurology Dr. Coronado recommending  to taper off of Keppra and start Vimpat as maybe Keppra is attributing to his worsening agitation. Decrease Keppra to 250 mg tonight and last dose 250 mg in am,  and start Vimpat 50 mg BID today     Lis Carrera NP-C  #[id number] Called by neurology Dr. Coronado recommending  to taper off of Keppra and start Vimpat as maybe Keppra is attributing to his worsening agitation. Decrease Keppra to 250 mg tonight and last dose 250 mg in am,  and start Vimpat 50 mg BID today     Lis GUTIERREZ  #[id number]    @ [phone] called by neurology Dr. Coronado recommneded Loading dose of Vimpat 100mg IV or PO X1 now and can start Vimpat 50 mg BID this afternoon. Will give Vimpat 100mg IV X1 now

## 2019-08-30 LAB
ANION GAP SERPL CALC-SCNC: 13 MMOL/L — SIGNIFICANT CHANGE UP (ref 5–17)
BUN SERPL-MCNC: 32 MG/DL — HIGH (ref 7–23)
CALCIUM SERPL-MCNC: 10.2 MG/DL — SIGNIFICANT CHANGE UP (ref 8.4–10.5)
CHLORIDE SERPL-SCNC: 102 MMOL/L — SIGNIFICANT CHANGE UP (ref 96–108)
CO2 SERPL-SCNC: 25 MMOL/L — SIGNIFICANT CHANGE UP (ref 22–31)
CREAT SERPL-MCNC: 1.24 MG/DL — SIGNIFICANT CHANGE UP (ref 0.5–1.3)
GLUCOSE SERPL-MCNC: 138 MG/DL — HIGH (ref 70–99)
HCT VFR BLD CALC: 29.2 % — LOW (ref 39–50)
HGB BLD-MCNC: 10 G/DL — LOW (ref 13–17)
MCHC RBC-ENTMCNC: 34 PG — SIGNIFICANT CHANGE UP (ref 27–34)
MCHC RBC-ENTMCNC: 34.2 GM/DL — SIGNIFICANT CHANGE UP (ref 32–36)
MCV RBC AUTO: 99.3 FL — SIGNIFICANT CHANGE UP (ref 80–100)
PLATELET # BLD AUTO: 144 K/UL — LOW (ref 150–400)
POTASSIUM SERPL-MCNC: 3.3 MMOL/L — LOW (ref 3.5–5.3)
POTASSIUM SERPL-SCNC: 3.3 MMOL/L — LOW (ref 3.5–5.3)
RBC # BLD: 2.94 M/UL — LOW (ref 4.2–5.8)
RBC # FLD: 12.4 % — SIGNIFICANT CHANGE UP (ref 10.3–14.5)
SODIUM SERPL-SCNC: 140 MMOL/L — SIGNIFICANT CHANGE UP (ref 135–145)
WBC # BLD: 4.04 K/UL — SIGNIFICANT CHANGE UP (ref 3.8–10.5)
WBC # FLD AUTO: 4.04 K/UL — SIGNIFICANT CHANGE UP (ref 3.8–10.5)

## 2019-08-30 PROCEDURE — 99233 SBSQ HOSP IP/OBS HIGH 50: CPT

## 2019-08-30 PROCEDURE — 71045 X-RAY EXAM CHEST 1 VIEW: CPT | Mod: 26

## 2019-08-30 RX ORDER — AMLODIPINE BESYLATE 2.5 MG/1
10 TABLET ORAL DAILY
Refills: 0 | Status: DISCONTINUED | OUTPATIENT
Start: 2019-08-30 | End: 2019-09-05

## 2019-08-30 RX ORDER — POTASSIUM CHLORIDE 20 MEQ
20 PACKET (EA) ORAL
Refills: 0 | Status: COMPLETED | OUTPATIENT
Start: 2019-08-30 | End: 2019-08-30

## 2019-08-30 RX ORDER — CHOLECALCIFEROL (VITAMIN D3) 125 MCG
1000 CAPSULE ORAL DAILY
Refills: 0 | Status: DISCONTINUED | OUTPATIENT
Start: 2019-08-30 | End: 2019-09-05

## 2019-08-30 RX ADMIN — LACOSAMIDE 50 MILLIGRAM(S): 50 TABLET ORAL at 06:26

## 2019-08-30 RX ADMIN — FLUDROCORTISONE ACETATE 0.1 MILLIGRAM(S): 0.1 TABLET ORAL at 06:26

## 2019-08-30 RX ADMIN — ATORVASTATIN CALCIUM 40 MILLIGRAM(S): 80 TABLET, FILM COATED ORAL at 21:52

## 2019-08-30 RX ADMIN — LEVETIRACETAM 400 MILLIGRAM(S): 250 TABLET, FILM COATED ORAL at 06:26

## 2019-08-30 RX ADMIN — Medication 50 MILLIEQUIVALENT(S): at 16:49

## 2019-08-30 RX ADMIN — Medication 50 MILLIEQUIVALENT(S): at 22:30

## 2019-08-30 RX ADMIN — Medication 50 MILLIEQUIVALENT(S): at 18:33

## 2019-08-30 RX ADMIN — LACOSAMIDE 50 MILLIGRAM(S): 50 TABLET ORAL at 17:44

## 2019-08-30 RX ADMIN — AMLODIPINE BESYLATE 10 MILLIGRAM(S): 2.5 TABLET ORAL at 14:52

## 2019-08-30 NOTE — PROGRESS NOTE ADULT - PROBLEM SELECTOR PLAN 5
Cr 1.49, around baseline  - monitor Cr  - avoid nephrotoxins Cr 1.23, around baseline  - avoid nephrotoxins

## 2019-08-30 NOTE — SWALLOW VFSS/MBS ASSESSMENT ADULT - SLP GENERAL OBSERVATIONS
Pt encountered in radiology, secured in FANNY chair. Pt lethargic with poor seated posture, requiring a bolster to remain upright in chair. Pt minimally responding to name, requiring max verbal/tactile cues to arouse. Pt able to repeat simple words "hello", however, unable to maintain arousal. Mentation did not support PO trials for purposes of evaluation, therefore, examination discontinued. SPO2 on RA fluctuated from 80-88%, to 93-95%. RN Patricia and Nehal contacted and arrived to floor to transport pt back to floor. Once back to floor, pt placed on 2L O2 and SPO2 maintained at 95%. Discussed change in status with RNs and ROBIN Grgeorio. Plan to remain NPO due to change in mental status and mentation that does not support PO intake at this time.

## 2019-08-30 NOTE — PROGRESS NOTE ADULT - PROBLEM SELECTOR PLAN 2
Left frontoparietal chronic subdural hematoma, w/ mass effect on the left frontal lobe and rightward midline shift.  Repeat CT scan stable  - Evaluated by NSG, no acute surgical interventions.   - appreciated Neurology input. EEG showed no epileptic spikes. On VIMPAT 50mg bid per neurology now  - S & S evaluated, MBS today, aspiration precaution Left frontoparietal chronic subdural hematoma, w/ mass effect on the left frontal lobe and rightward midline shift.  Repeat CT scan stable  - Evaluated by NSG, no acute surgical interventions.   - appreciated Neurology input. EEG showed no epileptic spikes. On VIMPAT 50mg   bid now per neurology  - S & S evaluated, MBS today, aspiration precaution  - c/w NPO for now

## 2019-08-30 NOTE — PROGRESS NOTE ADULT - PROBLEM SELECTOR PLAN 1
Presented following episode of LOC lasting 30 mins per attendant.  Possibly related to orthostatic hypotension & SDH. Reviewed Echo, CT brain, Neurology, NeuroSx eval  - c/w Presented following episode of LOC lasting 30 mins per attendant.  Possibly related to orthostatic hypotension & SDH. Reviewed Echo, CT brain, Neurology, NeuroSx eval  - c/w VIMPAT, Florinef, statin

## 2019-08-30 NOTE — SWALLOW VFSS/MBS ASSESSMENT ADULT - SLP PERTINENT HISTORY OF CURRENT PROBLEM
85 y/o M with PMHx of advanced dementia (AAOx1-2 at baseline), orthostatic hypotension (on fludrocortisone), seizures (on keppra), prostate Ca, who presented as a transfer from [facility] for neurosurgical eval after SDH found on imaging on 8/26. Patient is AAOx1 and unable to provide history. History obtained from daughter at bedside. As per daughter, patient had an episode of unresponsiveness lasting 30 minutes. Patient was accompanied by HHA at the time, who was assisting patient with cleaning when patient passed out. Patient was sitting down at the time. No seizure-like activity while pt was out. EMS was called and patient regained consciousness in ED 30 mins later. Patient has hx of syncopal episodes when standing up, which was thought to be due to orthostatic hypotension. Daughter reports that patient frequently has these episodes, but was told by her PCP not to call EMS unless pt remained out for prolonged period of time.

## 2019-08-30 NOTE — SWALLOW VFSS/MBS ASSESSMENT ADULT - DIAGNOSTIC IMPRESSIONS
Pt encountered in radiology, secured in FANNY chair. Pt lethargic with poor seated posture, requiring a bolster to remain upright in chair. Pt minimally responding to name, requiring max verbal/tactile cues to arouse. Pt able to repeat simple words "hello", however, unable to maintain arousal. Mentation did not support PO trials for purposes of evaluation, therefore, trials deferred and examination discontinued. SPO2 on RA fluctuated from 80-88%, to 93-95%. RN Patricia and Nehal contacted and arrived to floor to transport pt back to floor. Once back to floor, pt placed on 2L O2 and SPO2 maintained at 95%. Discussed change in status with RNs and ROBIN Gregorio. Plan to remain NPO due to change in mental status and mentation that does not support PO intake at this time

## 2019-08-30 NOTE — PROGRESS NOTE ADULT - PROBLEM SELECTOR PLAN 4
Afebrile, BP is 148/79  - c/w home fludrocortisone and hold home amlodipine  - orthostatic vital Afebrile, BP is 148/79  - c/w home fludrocortisone and will resume home amlodipine

## 2019-08-31 LAB
HCT VFR BLD CALC: 31 % — LOW (ref 39–50)
HGB BLD-MCNC: 9.8 G/DL — LOW (ref 13–17)
MCHC RBC-ENTMCNC: 31.6 GM/DL — LOW (ref 32–36)
MCHC RBC-ENTMCNC: 32.8 PG — SIGNIFICANT CHANGE UP (ref 27–34)
MCV RBC AUTO: 103.7 FL — HIGH (ref 80–100)
PLATELET # BLD AUTO: 129 K/UL — LOW (ref 150–400)
RBC # BLD: 2.99 M/UL — LOW (ref 4.2–5.8)
RBC # FLD: 12.8 % — SIGNIFICANT CHANGE UP (ref 10.3–14.5)
WBC # BLD: 3.21 K/UL — LOW (ref 3.8–10.5)
WBC # FLD AUTO: 3.21 K/UL — LOW (ref 3.8–10.5)

## 2019-08-31 PROCEDURE — 93010 ELECTROCARDIOGRAM REPORT: CPT

## 2019-08-31 PROCEDURE — 99233 SBSQ HOSP IP/OBS HIGH 50: CPT

## 2019-08-31 RX ORDER — LOSARTAN POTASSIUM 100 MG/1
25 TABLET, FILM COATED ORAL DAILY
Refills: 0 | Status: DISCONTINUED | OUTPATIENT
Start: 2019-08-31 | End: 2019-09-01

## 2019-08-31 RX ADMIN — AMLODIPINE BESYLATE 10 MILLIGRAM(S): 2.5 TABLET ORAL at 05:27

## 2019-08-31 RX ADMIN — Medication 1000 UNIT(S): at 14:56

## 2019-08-31 RX ADMIN — LACOSAMIDE 50 MILLIGRAM(S): 50 TABLET ORAL at 05:27

## 2019-08-31 RX ADMIN — LOSARTAN POTASSIUM 25 MILLIGRAM(S): 100 TABLET, FILM COATED ORAL at 18:00

## 2019-08-31 RX ADMIN — ATORVASTATIN CALCIUM 40 MILLIGRAM(S): 80 TABLET, FILM COATED ORAL at 21:36

## 2019-08-31 RX ADMIN — LACOSAMIDE 50 MILLIGRAM(S): 50 TABLET ORAL at 18:00

## 2019-08-31 RX ADMIN — FLUDROCORTISONE ACETATE 0.1 MILLIGRAM(S): 0.1 TABLET ORAL at 05:27

## 2019-08-31 NOTE — PROGRESS NOTE ADULT - PROBLEM SELECTOR PLAN 4
Presented following episode of LOC lasting 30 mins per attendant.  Possibly related to orthostatic hypotension & SDH. Reviewed Echo, CT brain, Neurology, NeuroSx eval  - c/w VIMPAT, Florinef, statin

## 2019-08-31 NOTE — PROGRESS NOTE ADULT - PROBLEM SELECTOR PLAN 8
As above   avoid sedative medications, continue with supportive care
Need to discuss with family prior treatment and current status of disease process.
As above   avoid sedative medications, continue with supportive care

## 2019-08-31 NOTE — PROGRESS NOTE ADULT - PROBLEM SELECTOR PLAN 2
Afebrile, SBP is elevated 168-174, he is bedbound  - c/w Norvasc, will add losartan 25mg/d  - c/w home fludrocortisone and will resume home amlodipine

## 2019-08-31 NOTE — CHART NOTE - NSCHARTNOTEFT_GEN_A_CORE
PA Medicine Event Note    Notified by RN 2 minutes HR sinus tachycardia/paced on tele up to 125 then back down to 60s.  Patient sleeping at time. Asymptomatic, NAD.  EKG ordered  Spoke to Dr. Aviles and reviewed EKG with attending-> no significant changes from previous EKG.  continue to monitor  will endorse to night team    Lorin Avina PA-C  Dept of medicine  #[id number]

## 2019-08-31 NOTE — PROGRESS NOTE ADULT - PROBLEM SELECTOR PLAN 1
Ct brain showed left frontoparietal chronic subdural hematoma, w/ mass effect on the left frontal lobe and rightward midline shift.  Repeat CT scan stable  - Evaluated by NSG, no acute surgical interventions.   - appreciated Neurology input. EEG showed no epileptic spikes. On VIMPAT 50mg   bid per neurology  - S & S evaluated, recommended MBS- had problem as he is not following command,    aspiration precaution, CXR no pneumonia  - c/w dysphagia diet  - He is DNR, family requested palliative/Hospice care consult

## 2019-09-01 DIAGNOSIS — I47.1 SUPRAVENTRICULAR TACHYCARDIA: ICD-10-CM

## 2019-09-01 LAB
ANION GAP SERPL CALC-SCNC: 13 MMOL/L — SIGNIFICANT CHANGE UP (ref 5–17)
BUN SERPL-MCNC: 32 MG/DL — HIGH (ref 7–23)
CALCIUM SERPL-MCNC: 10.5 MG/DL — SIGNIFICANT CHANGE UP (ref 8.4–10.5)
CHLORIDE SERPL-SCNC: 105 MMOL/L — SIGNIFICANT CHANGE UP (ref 96–108)
CO2 SERPL-SCNC: 26 MMOL/L — SIGNIFICANT CHANGE UP (ref 22–31)
CREAT SERPL-MCNC: 1.46 MG/DL — HIGH (ref 0.5–1.3)
GLUCOSE SERPL-MCNC: 108 MG/DL — HIGH (ref 70–99)
POTASSIUM SERPL-MCNC: 3.8 MMOL/L — SIGNIFICANT CHANGE UP (ref 3.5–5.3)
POTASSIUM SERPL-SCNC: 3.8 MMOL/L — SIGNIFICANT CHANGE UP (ref 3.5–5.3)
SODIUM SERPL-SCNC: 144 MMOL/L — SIGNIFICANT CHANGE UP (ref 135–145)

## 2019-09-01 PROCEDURE — 99233 SBSQ HOSP IP/OBS HIGH 50: CPT

## 2019-09-01 PROCEDURE — 99223 1ST HOSP IP/OBS HIGH 75: CPT

## 2019-09-01 RX ORDER — METOPROLOL TARTRATE 50 MG
25 TABLET ORAL DAILY
Refills: 0 | Status: DISCONTINUED | OUTPATIENT
Start: 2019-09-01 | End: 2019-09-05

## 2019-09-01 RX ORDER — SODIUM CHLORIDE 9 MG/ML
1000 INJECTION, SOLUTION INTRAVENOUS
Refills: 0 | Status: DISCONTINUED | OUTPATIENT
Start: 2019-09-01 | End: 2019-09-03

## 2019-09-01 RX ADMIN — FLUDROCORTISONE ACETATE 0.1 MILLIGRAM(S): 0.1 TABLET ORAL at 06:17

## 2019-09-01 RX ADMIN — AMLODIPINE BESYLATE 10 MILLIGRAM(S): 2.5 TABLET ORAL at 06:17

## 2019-09-01 RX ADMIN — LACOSAMIDE 50 MILLIGRAM(S): 50 TABLET ORAL at 21:55

## 2019-09-01 RX ADMIN — LOSARTAN POTASSIUM 25 MILLIGRAM(S): 100 TABLET, FILM COATED ORAL at 06:17

## 2019-09-01 RX ADMIN — SODIUM CHLORIDE 70 MILLILITER(S): 9 INJECTION, SOLUTION INTRAVENOUS at 20:02

## 2019-09-01 RX ADMIN — Medication 1000 UNIT(S): at 11:52

## 2019-09-01 RX ADMIN — Medication 25 MILLIGRAM(S): at 10:01

## 2019-09-01 RX ADMIN — SODIUM CHLORIDE 70 MILLILITER(S): 9 INJECTION, SOLUTION INTRAVENOUS at 09:56

## 2019-09-01 RX ADMIN — ATORVASTATIN CALCIUM 40 MILLIGRAM(S): 80 TABLET, FILM COATED ORAL at 21:55

## 2019-09-01 RX ADMIN — LACOSAMIDE 50 MILLIGRAM(S): 50 TABLET ORAL at 06:17

## 2019-09-01 NOTE — CONSULT NOTE ADULT - PROBLEM SELECTOR RECOMMENDATION 2
- Likely multifactorial, in the setting of advanced dementia, CKD, new SDH, among other medical issues.  - Supportive care.  - Patient is tolerating diet.  - Artifical nutrition is not recommended.

## 2019-09-01 NOTE — PROGRESS NOTE ADULT - PROBLEM SELECTOR PLAN 2
Frequent PATs noted on Tele for last 2 days. Afebrile, No acute SOB  - Metoprolol 25mg bid added to Norvasc, BP has been stable

## 2019-09-01 NOTE — CONSULT NOTE ADULT - PROBLEM SELECTOR RECOMMENDATION 3
- Patient with advanced dementia.  - Now with SDH, prognosis is even poorer.  - Hospice is appropriate.  - Referral made.   - At this point, patient is not inpatient appropriate.

## 2019-09-01 NOTE — CONSULT NOTE ADULT - ASSESSMENT
86 M with numerous comorbidities, including SHD, advanced dementia, FTT, encounter for palliative care.

## 2019-09-01 NOTE — PROGRESS NOTE ADULT - PROBLEM SELECTOR PLAN 1
mental status unchanged, He has been calm, AAOx1, Ct brain showed left frontoparietal chronic subdural hematoma, w/ mass effect on the left frontal lobe and rightward midline shift.  Repeat CT scan stable  - Evaluated by NSG, no acute surgical interventions.   - appreciated Neurology input. EEG showed no epileptic spikes. On VIMPAT 50mg   bid per neurology  - S & S evaluated, recommended MBS- had problem as he is not following command, Will repeat once stable in few days   aspiration precaution, CXR no pneumonia  - c/w dysphagia diet  - He is DNR, family requested palliative/Hospice care consult

## 2019-09-01 NOTE — CONSULT NOTE ADULT - PROBLEM SELECTOR RECOMMENDATION 9
- Patient with stable SDH evident on repeat CT, with subtle shift to the right.  - No overt NSX intervention.  - Nuerology following

## 2019-09-01 NOTE — CONSULT NOTE ADULT - SUBJECTIVE AND OBJECTIVE BOX
HPI:  85 y/o M with PMHx of dementia (AAOx1-2 at baseline), orthostatic hypotension (on fludrocortisone), seizures, prostate Ca, who presents as a transfer from [facility] for neurosurgical eval after SDH found on imaging. Patient is AAOx1 and unable to provide history. History obtained from daughter at bedside. As per daughter, patient had an episode of unresponsiveness lasting 30 minutes. Patient was accompanied by HHA at the time, who was assisting patient with cleaning when patient passed out. Patient was sitting down at the time. No seizure-like activity while pt was out. Pt did not complain of CP, SOB, palpitations, or change in vision prior to LOC. EMS was called and patient regained consciousness in ED 30 mins later. Patient has hx of syncopal episodes when standing up, which was thought to be due to orthostatic hypotension. Daughter reports that patient frequently has these episodes, but was told by her PCP not to call EMS unless pt remained out for prolonged period of time. Currently on fludrocortisone, which he takes as directed. Pt has reported hx of seizures diagnosed at OSH, for which he has been on Keppra for years, but daughter does not recall pt ever having seizure-like activity. Pt has been eating and drinking well, and was at his baseline prior to the incident. No diarrhea, dysuria, abdominal pain, or N/V. Daughter reports that patient's mental/functional status has been declining since his hospital admission in 12/2018, when he had presented following a fall and found to have SDH. At current baseline, patient is AAOx1-2, requires assistance to ambulate and needs assistance with all ADLs. Lives with daughter and has HHA during the day.     In the [facility] ED:   Vitals: afebrile, HR 82, /85, spO2 % on RA  Labs: Significant for Cr 1.49 (BL 1.4)  Imaging: CT Head showed left frontoparietal chronic subdural hematoma measuring 1.6 cm in maximal thickness, exerting mass effect on the left frontal lobe as well as causing 6 mm of rightward midline shift.  EKG - NSR, no ST-T wave changes, QTc 413  Given: Keppra 500mg IV (27 Aug 2019 01:43)    PERTINENT PM/SXH:   Seizure  Orthostatic hypotension  Pacemaker  HTN (hypertension)  Prostate ca    Pacemaker    FAMILY HISTORY:  No pertinent family history in first degree relatives    ITEMS NOT CHECKED ARE NOT PRESENT    SOCIAL HISTORY:   Significant other/partner:  [ x]  Children:  [x ]  Baptism/Spirituality: Congregation  Substance hx:  [ ]   Tobacco hx:  [x ]   Alcohol hx: [ ]   Home Opioid hx:  [ ] I-Stop Reference No:  Living Situation: [ ]Home  [ ]Long term care  [ ]Rehab [x ]Other    ADVANCE DIRECTIVES:    DNR  Yes  MOLST  [ x]  Living Will  [ ]   DECISION MAKER(s):  [ ] Health Care Proxy(s)  [ x] Surrogate(s)  [ ] Guardian           Name(s): Phone Number(s): Branden Wallace (wife)    BASELINE (I)ADL(s) (prior to admission):  [place]: [ ]Total  [ ] Moderate [ x]Dependent    Allergies    No Known Allergies    Intolerances    MEDICATIONS  (STANDING):  amLODIPine   Tablet 10 milliGRAM(s) Oral daily  atorvastatin 40 milliGRAM(s) Oral at bedtime  cholecalciferol 1000 Unit(s) Oral daily  fludroCORTISONE 0.1 milliGRAM(s) Oral daily  lacosamide 50 milliGRAM(s) Oral two times a day  metoprolol succinate ER 25 milliGRAM(s) Oral daily  sodium chloride 0.45%. 1000 milliLiter(s) (70 mL/Hr) IV Continuous <Continuous>    MEDICATIONS  (PRN):    PRESENT SYMPTOMS: [x ]Unable to obtain due to poor mentation   Source if other than patient:  [ ]Family   [ ]Team     Pain (Impact on QOL):  None  Location -         Minimal acceptable level (0-10 scale):                    Aggravating factors -  Quality -  Radiation -  Severity (0-10 scale) -    Timing -    PAIN AD Score:     http://geriatrictoolkit.[facility]/cog/painad.pdf (press ctrl +  left click to view)    Dyspnea:                           [ ]Mild [ ]Moderate [ ]Severe  Anxiety:                             [ ]Mild [ ]Moderate [ ]Severe  Fatigue:                             [ ]Mild [ ]Moderate [ ]Severe  Nausea:                             [ ]Mild [ ]Moderate [ ]Severe  Loss of appetite:              [ ]Mild [ ]Moderate [ ]Severe  Constipation:                    [ ]Mild [ ]Moderate [ ]Severe  Grief Present                    [ ] Yes   [ ] No   Other Symptoms:  [ x]All other review of systems negative     Karnofsky Performance Score/Palliative Performance Status Version 2:   30       %    http://palliative.info/resource_material/PPSv2.pdf  PHYSICAL EXAM:  Vital Signs Last 24 Hrs  T(C): 36.9 (02 Sep 2019 05:45), Max: 36.9 (02 Sep 2019 05:45)  T(F): 98.4 (02 Sep 2019 05:45), Max: 98.4 (02 Sep 2019 05:45)  HR: 62 (02 Sep 2019 05:45) (62 - 88)  BP: 155/75 (02 Sep 2019 05:45) (139/69 - 179/86)  BP(mean): --  RR: 18 (02 Sep 2019 05:45) (18 - 19)  SpO2: 98% (02 Sep 2019 05:45) (97% - 100%) I&O's Summary    01 Sep 2019 07:01  -  02 Sep 2019 07:00  --------------------------------------------------------  IN: 660 mL / OUT: 0 mL / NET: 660 mL    GENERAL:  [x ]Alert  [x ]Oriented x 1  [ x]Lethargic  [ ]Cachexia  [ ]Unarousable  [ ]Verbal  [ ]Non-Verbal  Behavioral:   [ ] Anxiety  [ ] Delirium [ ] Agitation [x ] Other  HEENT:  [ ]Normal   [ x]Dry mouth   [ ]ET Tube/Trach  [ ]Oral lesions  PULMONARY:   [x ]Clear [ ]Tachypnea  [ ]Audible excessive secretions   [ ]Rhonchi        [ ]Right [ ]Left [ ]Bilateral  [ ]Crackles        [ ]Right [ ]Left [ ]Bilateral  [ ]Wheezing     [ ]Right [ ]Left [ ]Bilateral  CARDIOVASCULAR:    [ x]Regular [ ]Irregular [ ]Tachy  [ ]Memo [ ]Murmur [ ]Other  GASTROINTESTINAL:  [ x]Soft  [ ]Distended   [x ]+BS  [x ]Non tender [ ]Tender  [ ]PEG [ ]OGT/ NGT  Last BM:   GENITOURINARY:  [ ]Normal [ ] Incontinent   [ ]Oliguria/Anuria   [x ][place]  MUSCULOSKELETAL:   [ ]Normal   [ ]Weakness  [ x]Bed/Wheelchair bound [ ]Edema  NEUROLOGIC:   [ ]No focal deficits  [x ] Cognitive impairment  [ ] Dysphagia [ ]Dysarthria [ ] Paresis [ ]Other   SKIN:   [x ]Normal   [ ]Pressure ulcer(s)  [ ]Rash    CRITICAL CARE:  [ ] Shock Present  [ ]Septic [ ]Cardiogenic [ ]Neurologic [ ]Hypovolemic  [ ]  Vasopressors [ ]  Inotropes   [ ] Respiratory failure present  [ ] Acute  [ ] Chronic [ ] Hypoxic  [ ] Hypercarbic [ ] Other  [ ] Other organ failure     LABS:                        9.8    3.21  )-----------( 129      ( 31 Aug 2019 16:09 )             31.0   09-01    144  |  105  |  32<H>  ----------------------------<  108<H>  3.8   |  26  |  1.46<H>    Ca    10.5      01 Sep 2019 06:24          RADIOLOGY & ADDITIONAL STUDIES:    PROTEIN CALORIE MALNUTRITION PRESENT: [ ] Yes [ ] No  [ ] PPSV2 < or = to 30% [ ] significant weight loss  [ ] poor nutritional intake [ ] catabolic state [ ] anasarca     Albumin, Serum: 3.7 g/dL (08-27-19 @ 12:21)  Artificial Nutrition [ ]     REFERRALS:   [ ]Chaplaincy  [ ] Hospice  [ ]Child Life  [ ]Social Work  [ ]Case management [ ]Holistic Therapy   Goals of Care Discussion Document:

## 2019-09-02 DIAGNOSIS — Z51.5 ENCOUNTER FOR PALLIATIVE CARE: ICD-10-CM

## 2019-09-02 DIAGNOSIS — S06.5X9A TRAUMATIC SUBDURAL HEMORRHAGE WITH LOSS OF CONSCIOUSNESS OF UNSPECIFIED DURATION, INITIAL ENCOUNTER: ICD-10-CM

## 2019-09-02 DIAGNOSIS — F03.90 UNSPECIFIED DEMENTIA WITHOUT BEHAVIORAL DISTURBANCE: ICD-10-CM

## 2019-09-02 DIAGNOSIS — R62.7 ADULT FAILURE TO THRIVE: ICD-10-CM

## 2019-09-02 LAB
ANION GAP SERPL CALC-SCNC: 11 MMOL/L — SIGNIFICANT CHANGE UP (ref 5–17)
BASOPHILS # BLD AUTO: 0.01 K/UL — SIGNIFICANT CHANGE UP (ref 0–0.2)
BASOPHILS NFR BLD AUTO: 0.3 % — SIGNIFICANT CHANGE UP (ref 0–2)
BUN SERPL-MCNC: 38 MG/DL — HIGH (ref 7–23)
CALCIUM SERPL-MCNC: 10.3 MG/DL — SIGNIFICANT CHANGE UP (ref 8.4–10.5)
CHLORIDE SERPL-SCNC: 106 MMOL/L — SIGNIFICANT CHANGE UP (ref 96–108)
CO2 SERPL-SCNC: 26 MMOL/L — SIGNIFICANT CHANGE UP (ref 22–31)
CREAT SERPL-MCNC: 1.53 MG/DL — HIGH (ref 0.5–1.3)
EOSINOPHIL # BLD AUTO: 0.12 K/UL — SIGNIFICANT CHANGE UP (ref 0–0.5)
EOSINOPHIL NFR BLD AUTO: 3.2 % — SIGNIFICANT CHANGE UP (ref 0–6)
GLUCOSE SERPL-MCNC: 116 MG/DL — HIGH (ref 70–99)
HCT VFR BLD CALC: 29.1 % — LOW (ref 39–50)
HGB BLD-MCNC: 9.3 G/DL — LOW (ref 13–17)
IMM GRANULOCYTES NFR BLD AUTO: 0.3 % — SIGNIFICANT CHANGE UP (ref 0–1.5)
LYMPHOCYTES # BLD AUTO: 0.7 K/UL — LOW (ref 1–3.3)
LYMPHOCYTES # BLD AUTO: 18.8 % — SIGNIFICANT CHANGE UP (ref 13–44)
MAGNESIUM SERPL-MCNC: 2.3 MG/DL — SIGNIFICANT CHANGE UP (ref 1.6–2.6)
MCHC RBC-ENTMCNC: 32 GM/DL — SIGNIFICANT CHANGE UP (ref 32–36)
MCHC RBC-ENTMCNC: 32.5 PG — SIGNIFICANT CHANGE UP (ref 27–34)
MCV RBC AUTO: 101.7 FL — HIGH (ref 80–100)
MONOCYTES # BLD AUTO: 0.44 K/UL — SIGNIFICANT CHANGE UP (ref 0–0.9)
MONOCYTES NFR BLD AUTO: 11.8 % — SIGNIFICANT CHANGE UP (ref 2–14)
NEUTROPHILS # BLD AUTO: 2.45 K/UL — SIGNIFICANT CHANGE UP (ref 1.8–7.4)
NEUTROPHILS NFR BLD AUTO: 65.6 % — SIGNIFICANT CHANGE UP (ref 43–77)
PHOSPHATE SERPL-MCNC: 3.1 MG/DL — SIGNIFICANT CHANGE UP (ref 2.5–4.5)
PLATELET # BLD AUTO: 165 K/UL — SIGNIFICANT CHANGE UP (ref 150–400)
POTASSIUM SERPL-MCNC: 3.8 MMOL/L — SIGNIFICANT CHANGE UP (ref 3.5–5.3)
POTASSIUM SERPL-SCNC: 3.8 MMOL/L — SIGNIFICANT CHANGE UP (ref 3.5–5.3)
RBC # BLD: 2.86 M/UL — LOW (ref 4.2–5.8)
RBC # FLD: 12.7 % — SIGNIFICANT CHANGE UP (ref 10.3–14.5)
SODIUM SERPL-SCNC: 143 MMOL/L — SIGNIFICANT CHANGE UP (ref 135–145)
WBC # BLD: 3.73 K/UL — LOW (ref 3.8–10.5)
WBC # FLD AUTO: 3.73 K/UL — LOW (ref 3.8–10.5)

## 2019-09-02 PROCEDURE — 99233 SBSQ HOSP IP/OBS HIGH 50: CPT

## 2019-09-02 RX ADMIN — ATORVASTATIN CALCIUM 40 MILLIGRAM(S): 80 TABLET, FILM COATED ORAL at 21:27

## 2019-09-02 RX ADMIN — AMLODIPINE BESYLATE 10 MILLIGRAM(S): 2.5 TABLET ORAL at 05:52

## 2019-09-02 RX ADMIN — Medication 25 MILLIGRAM(S): at 05:52

## 2019-09-02 RX ADMIN — LACOSAMIDE 50 MILLIGRAM(S): 50 TABLET ORAL at 05:52

## 2019-09-02 RX ADMIN — LACOSAMIDE 50 MILLIGRAM(S): 50 TABLET ORAL at 17:14

## 2019-09-02 RX ADMIN — Medication 1000 UNIT(S): at 12:07

## 2019-09-02 RX ADMIN — FLUDROCORTISONE ACETATE 0.1 MILLIGRAM(S): 0.1 TABLET ORAL at 05:52

## 2019-09-02 NOTE — PROGRESS NOTE ADULT - PROBLEM SELECTOR PLAN 2
Frequent PATs noted on Tele for last 2 days. Afebrile, No acute SOB  - Metoprolol 25mg bid added to Norvasc, BP has been stable Frequent PATs noted on Tele for last 2 days. Tele- A-paced 60-90s today. Afebrile, No acute SOB  - Metoprolol 25mg bid added to Norvasc, BP has been stable

## 2019-09-02 NOTE — PROGRESS NOTE ADULT - PROBLEM SELECTOR PLAN 1
Patient in AAOx 1, mental status unchanged, Ct brain showed left frontoparietal chronic subdural hematoma, w/ mass effect on the left frontal lobe and rightward midline shift.  Repeat CT scan stable  - Evaluated by NSG, no acute surgical interventions.   - appreciated Neurology input. EEG showed no epileptic spikes. On VIMPAT 50mg   bid per neurology  - S & S evaluated, recommended MBS- had problem as he is not following   command, Will repeat once stable in few days    aspiration precaution, CXR no pneumonia  - c/w dysphagia diet  - He is DNR,   - Palliative consult appreciated

## 2019-09-03 LAB
ANION GAP SERPL CALC-SCNC: 11 MMOL/L — SIGNIFICANT CHANGE UP (ref 5–17)
BUN SERPL-MCNC: 39 MG/DL — HIGH (ref 7–23)
CALCIUM SERPL-MCNC: 10.4 MG/DL — SIGNIFICANT CHANGE UP (ref 8.4–10.5)
CHLORIDE SERPL-SCNC: 106 MMOL/L — SIGNIFICANT CHANGE UP (ref 96–108)
CO2 SERPL-SCNC: 26 MMOL/L — SIGNIFICANT CHANGE UP (ref 22–31)
CREAT SERPL-MCNC: 1.51 MG/DL — HIGH (ref 0.5–1.3)
GLUCOSE SERPL-MCNC: 99 MG/DL — SIGNIFICANT CHANGE UP (ref 70–99)
HCT VFR BLD CALC: 28.7 % — LOW (ref 39–50)
HGB BLD-MCNC: 9.2 G/DL — LOW (ref 13–17)
MAGNESIUM SERPL-MCNC: 2.3 MG/DL — SIGNIFICANT CHANGE UP (ref 1.6–2.6)
MCHC RBC-ENTMCNC: 32.1 GM/DL — SIGNIFICANT CHANGE UP (ref 32–36)
MCHC RBC-ENTMCNC: 32.9 PG — SIGNIFICANT CHANGE UP (ref 27–34)
MCV RBC AUTO: 102.5 FL — HIGH (ref 80–100)
PHOSPHATE SERPL-MCNC: 3.2 MG/DL — SIGNIFICANT CHANGE UP (ref 2.5–4.5)
PLATELET # BLD AUTO: 174 K/UL — SIGNIFICANT CHANGE UP (ref 150–400)
POTASSIUM SERPL-MCNC: 3.8 MMOL/L — SIGNIFICANT CHANGE UP (ref 3.5–5.3)
POTASSIUM SERPL-SCNC: 3.8 MMOL/L — SIGNIFICANT CHANGE UP (ref 3.5–5.3)
RBC # BLD: 2.8 M/UL — LOW (ref 4.2–5.8)
RBC # FLD: 12.6 % — SIGNIFICANT CHANGE UP (ref 10.3–14.5)
SODIUM SERPL-SCNC: 143 MMOL/L — SIGNIFICANT CHANGE UP (ref 135–145)
WBC # BLD: 4.62 K/UL — SIGNIFICANT CHANGE UP (ref 3.8–10.5)
WBC # FLD AUTO: 4.62 K/UL — SIGNIFICANT CHANGE UP (ref 3.8–10.5)

## 2019-09-03 PROCEDURE — 99233 SBSQ HOSP IP/OBS HIGH 50: CPT

## 2019-09-03 RX ADMIN — AMLODIPINE BESYLATE 10 MILLIGRAM(S): 2.5 TABLET ORAL at 05:30

## 2019-09-03 RX ADMIN — LACOSAMIDE 50 MILLIGRAM(S): 50 TABLET ORAL at 17:02

## 2019-09-03 RX ADMIN — Medication 25 MILLIGRAM(S): at 05:30

## 2019-09-03 RX ADMIN — ATORVASTATIN CALCIUM 40 MILLIGRAM(S): 80 TABLET, FILM COATED ORAL at 22:50

## 2019-09-03 RX ADMIN — Medication 1000 UNIT(S): at 11:18

## 2019-09-03 RX ADMIN — FLUDROCORTISONE ACETATE 0.1 MILLIGRAM(S): 0.1 TABLET ORAL at 05:30

## 2019-09-03 RX ADMIN — LACOSAMIDE 50 MILLIGRAM(S): 50 TABLET ORAL at 05:30

## 2019-09-03 NOTE — SWALLOW BEDSIDE ASSESSMENT ADULT - COMMENTS
H&P con't:  Pt has been eating and drinking well, and was at his baseline prior to the incident. No diarrhea, dysuria, abdominal pain, or N/V. Daughter reports that patient's mental/functional status has been declining since his hospital admission in 12/2018, when he had presented following a fall and found to have SDH. At current baseline, patient is AAOx1-2, requires assistance to ambulate and needs assistance with all ADLs. Lives with daughter and has HHA during the day.   CT Brain 8/26: Left frontoparietal chronic subdural hematoma measuring 1.6 cm in maximal thickness, exerting mass effect on the left frontal lobe as well as causing 6 mm of rightward midline shift.  CT Brain 8/27: No gross interval change compared with the prior study in somewhat mixed attenuation extra-axial collection with some component of   loculation. Subtle mass effect on the left lateral ventricle with subtle shift of the midline to the right.   Neurology consulted to rule-out seizure as the cause of his fall. Previous w/u for syncope concluded ortho hypotension. Low suspicion for sz. rec: cvEEG, decrease LEV.  EEG 8/28 &8/29: Abnormal EEG study. 1. Moderate nonspecific diffuse or multifocal cerebral dysfunction. 2. No epileptiform pattern or seizure seen.  Hospitalist 8/28: Echocardiogram reveals no structural abnormalities. PPM Interrogation: Normal PPM function.  Failed dysphagia screens 8/27 at 09:49 & 20:00  Attempted to perform MBS on 8/30, however, Pt unable to comply with exam due to altered mentation. Rec for NPO due to AMS  Medical team resumed a dysphagia 1 with honey thickened liquid diet on 8/30.

## 2019-09-03 NOTE — DIETITIAN INITIAL EVALUATION ADULT. - PROBLEM SELECTOR PLAN 3
AAOx1-2 at baseline, currently AAOx1 (oriented to self), but less responsive. Per daughter, patient is also speaking gibberish, which is not baseline;  Low suspicion for infection given afebrile and no leukocytosis. More likely related to recent fall vs postictal state vs SDH-related vs. ischemic CVA  - check RPR/B12/Folic acid  - check UA  - fall precautions  - aspiration precautions  - Keep NPO pending bedside dysphagia eval  - neurology consult

## 2019-09-03 NOTE — DIETITIAN INITIAL EVALUATION ADULT. - PHYSICAL APPEARANCE
Unable to conduct nutrition focused physical exam as pt sitting awake in chair, confused and unable to provide consent. Visually noted mild clavicle muscle wasting which maybe related to advanced age related muscle loss.

## 2019-09-03 NOTE — PROGRESS NOTE ADULT - PROBLEM SELECTOR PLAN 1
Patient in AAOx 1, mental status unchanged, Ct brain showed left frontoparietal chronic subdural hematoma, w/ mass effect on the left frontal lobe and rightward midline shift.  Repeat CT scan stable  - Evaluated by NSG, no acute surgical interventions.   - appreciated Neurology input. EEG showed no epileptic spikes. On VIMPAT 50mg   bid per neurology  - S & S evaluated, recommended MBS- had problem as he is not following   command, Will repeat once stable in few days    aspiration precaution, CXR no pneumonia  - c/w dysphagia diet  - He is DNR,   - Palliative consult appreciated will need follow up.

## 2019-09-03 NOTE — SWALLOW BEDSIDE ASSESSMENT ADULT - SWALLOW EVAL: BUCCAL STRENGTH AND MOBILITY
Unable to assess due to poor participation/comprehension
Unable to assess due to poor participation/comprehension

## 2019-09-03 NOTE — SWALLOW BEDSIDE ASSESSMENT ADULT - ADDITIONAL RECOMMENDATIONS
Monitor for s/s aspiration/laryngeal penetration. If noted:  D/C p.o. intake, provide non-oral nutrition/hydration/meds, and contact this service @ [phone]
diligent oral care

## 2019-09-03 NOTE — SWALLOW BEDSIDE ASSESSMENT ADULT - SLP PRECAUTIONS/LIMITATIONS: HEARING
impaired/Family reports [place] without augmentative device
Family reports [place] without augmentative device/impaired

## 2019-09-03 NOTE — DIETITIAN INITIAL EVALUATION ADULT. - PERTINENT MEDS FT
MEDICATIONS  (STANDING):  amLODIPine   Tablet 10 milliGRAM(s) Oral daily  atorvastatin 40 milliGRAM(s) Oral at bedtime  cholecalciferol 1000 Unit(s) Oral daily  fludroCORTISONE 0.1 milliGRAM(s) Oral daily  lacosamide 50 milliGRAM(s) Oral two times a day  metoprolol succinate ER 25 milliGRAM(s) Oral daily    MEDICATIONS  (PRN):

## 2019-09-03 NOTE — SWALLOW BEDSIDE ASSESSMENT ADULT - NS ASR SWALLOW FINDINGS DISCUS
Nursing/Patient/RN Levon; PCA Poornima
Nursing/Physician/Patient/Family/NP Lis; LIU Bond; Wife Branden

## 2019-09-03 NOTE — SWALLOW BEDSIDE ASSESSMENT ADULT - SLP PRECAUTIONS/LIMITATIONS: VISION
Family reports "some trouble with vision" without corrective lenses/impaired
Family reports "some trouble with vision" without corrective lenses/impaired

## 2019-09-03 NOTE — SWALLOW BEDSIDE ASSESSMENT ADULT - SWALLOW EVAL: RECOMMENDED FEEDING/EATING TECHNIQUES
check mouth frequently for oral residue/pocketing/crush medication (when feasible)/position upright (90 degrees)/small sips/bites/alternate food with liquid/allow for swallow between intakes/maintain upright posture during/after eating for 30 mins/no straws

## 2019-09-03 NOTE — DIETITIAN INITIAL EVALUATION ADULT. - ENERGY NEEDS
Height: 67 inches, Weight: 171.5 pounds  BMI: 26.9 kg/m2 IBW: 148 pounds (+/-10%), %IBW: 116%  Pertinent Info: Per chart, 85 y/o male with dementia, orthostatic hypotension, admitted with unresponsiveness and failure to thrive, SDH, palliative following for GOC. +1 generalized edema, no pressure ulcers noted at this time.

## 2019-09-03 NOTE — DIETITIAN INITIAL EVALUATION ADULT. - ADD RECOMMEND
1) Continue to monitor weight, lab values, and diet tolerance. 2) Observed 100% po intake of meal today, nursing staff confirm good po intakes. Continue to provide assistance as needed during meals. 3) Wt discrepancy from previous admission noted, continue to monitor wts. RD remains available.

## 2019-09-03 NOTE — SWALLOW BEDSIDE ASSESSMENT ADULT - SWALLOW EVAL: RECOMMENDED DIET
1)Dysphagia 1 with honey thickened liquids 2)FULL ASSIST/SUPERVISION WITH ALL MEALS 3)Only feed when alert/awake
1) Dysphagia I with honey thickened liquids 2) STRICT 100% SUPERVISION/ASSISTANCE 3) Must be alert/awake for all PO intake 4 )Allow increased time between bites/sips 5) Crush medications and place in applesauce (when feasible)

## 2019-09-03 NOTE — GOALS OF CARE CONVERSATION - PERSONAL ADVANCE DIRECTIVE - CONVERSATION DETAILS
[facility] RN Note    Met with pts spouse and daughter, they are agreeable to home hospice services. Consents signed, and pt was approved for home hospice.     Ya Borges RN

## 2019-09-03 NOTE — DIETITIAN INITIAL EVALUATION ADULT. - PROBLEM SELECTOR PLAN 2
CT Head showing left frontoparietal chronic subdural hematoma, w/ mass effect on the left frontal lobe and rightward midline shift.  Will repeat CT scan  - Evaluated by NSG, no acute surgical interventions.   - Hold off on steroids at this given no evidence of edema  - Neuro checks q4h  - c/w Keppra  - Fall precautions and aspiration precaution  - Hold ASA and DVT prophylaxis  - neurology consult

## 2019-09-03 NOTE — DIETITIAN INITIAL EVALUATION ADULT. - PERTINENT LABORATORY DATA
Na 143 [135 - 145], K+ 3.8 [3.5 - 5.3], BUN 39 [7 - 23], Cr 1.51 [0.50 - 1.30], BG 99 [70 - 99], Phos 3.2 [2.5 - 4.5], Alk Phos --, AST --, ALT --, Mg 2.3 [1.6 - 2.6], Ca 10.4 [8.4 - 10.5], HbA1c --

## 2019-09-03 NOTE — SWALLOW BEDSIDE ASSESSMENT ADULT - ASR SWALLOW ASPIRATION MONITOR
gurgly voice/change of breathing pattern/cough/fever/pneumonia/throat clearing/upper respiratory infection
change of breathing pattern/cough/fever/gurgly voice/pneumonia/throat clearing/upper respiratory infection

## 2019-09-03 NOTE — SWALLOW BEDSIDE ASSESSMENT ADULT - SWALLOW EVAL: DIAGNOSIS
Pt seen bedside for re-evaluation of PO tolerance. Pt tolerated trials of puree thin/thick and honey thickened liquids with initial tonic bite reflex upon spoon and reduced AP transport. As trials progressed, improved labial seal around spoon with improved stripping of bolus and overall bolus transport. Delayed swallow initiation and reduced laryngeal elevation. No overt signs/symptoms of penetration or aspiration across trials. Due to fluctuating arousal/mental status, suggest strict supervision with meals/only fed when alert/awake.
Pt presents with evidence of oropharyngeal dysphagia superimposed upon cognitive deficits, consistent with a h/o dementia. Pt initially disoriented to feeding process, resulting in poor stripping of bolus from spoon and oral holding without attempts to manipulation or transport bolus. Upon repeated attempts to have Pt feed self with assist of SLP, improved orientation/mentation with active stripping of bolus and lip closure around cup. Delayed AP transport (3-4 seconds) with suspected delay in swallow initiation and reduced elevation upon palpation. No overt signs/symptoms of penetration or aspiration on purees or honey thickened liquids via cup. Slight wet/gurgly vocal quality post nectar and thin liquids, suggestive of possible penetration/aspiration. Suggest MBS to objectively assess swallow mechanism and place Pt on least restrictive diet.

## 2019-09-03 NOTE — DIETITIAN INITIAL EVALUATION ADULT. - PROBLEM SELECTOR PLAN 1
Presented following episode of LOC lasting 30 mins. Differential for syncopal episode includes orthostasis, seizure, vasovagal, vs cardiogenic. Known hx of orthostatic hypotension, however patient was sitting at time of event. Low suspicion for primary cardiac etiology given no hx of CP or EKG changes. SDH noted on imaging appears chronic and less likely to be cause of acute episode. No evidence of seizure-like activity, although patient is altered greater than baseline, possibly representing post-ictal state.  - routine EEG to assess for seizure activity  - monitor on tele   - EP consult in AM for PPM interrogation ([employer] L321 ACCOLADE EL)  - check TTE for valvular disease  - check orthostatics, likely to be positive in patient with known orthostatic hypotension  - c/w Keppra 1000mg BID IV; will check level  - monitor off midodrine and amlodipine for now (unclear why pt on amlodipine and midodrine at home)

## 2019-09-03 NOTE — SWALLOW BEDSIDE ASSESSMENT ADULT - SLP GENERAL OBSERVATIONS
Pt encountered awake and alert sitting upright inbed. Pt AA&Ox1, pleasantly confused, consistent with a h/o advanced dementia. As per PCA Poornima, Pt has been tolerating all meals adequately. LIU Dos Santos stated that Pt is only fed when alert/awake and compliant with meals. At times, staff noted pt with fluctuating arousal and therefore, not fed at those times. Strict aspiration precautions reviewed and staff in accordance with plan.

## 2019-09-03 NOTE — DIETITIAN INITIAL EVALUATION ADULT. - REASON INDICATOR FOR ASSESSMENT
Pt seen for initial LOS assessment. Source: EMR, HHA at bedside, previous RD note; Pt with dementia AAO x 1, awake sitting in chair, confused and disoriented, unable to provide information

## 2019-09-03 NOTE — SWALLOW BEDSIDE ASSESSMENT ADULT - SLP PERTINENT HISTORY OF CURRENT PROBLEM
87 y/o M with PMHx of advanced dementia (AAOx1-2 at baseline), orthostatic hypotension (on fludrocortisone), seizures (on keppra), prostate Ca, who presented as a transfer from [facility] for neurosurgical eval after SDH found on imaging on 8/26. Patient is AAOx1 and unable to provide history. History obtained from daughter at bedside. As per daughter, patient had an episode of unresponsiveness lasting 30 minutes. Patient was accompanied by HHA at the time, who was assisting patient with cleaning when patient passed out. Patient was sitting down at the time. No seizure-like activity while pt was out. EMS was called and patient regained consciousness in ED 30 mins later. Patient has hx of syncopal episodes when standing up, which was thought to be due to orthostatic hypotension. Daughter reports that patient frequently has these episodes, but was told by her PCP not to call EMS unless pt remained out for prolonged period of time.
85 y/o M with PMHx of advanced dementia (AAOx1-2 at baseline), orthostatic hypotension (on fludrocortisone), seizures (on keppra), prostate Ca, who presented as a transfer from [facility] for neurosurgical eval after SDH found on imaging on 8/26. Patient is AAOx1 and unable to provide history. History obtained from daughter at bedside. As per daughter, patient had an episode of unresponsiveness lasting 30 minutes. Patient was accompanied by HHA at the time, who was assisting patient with cleaning when patient passed out. Patient was sitting down at the time. No seizure-like activity while pt was out. EMS was called and patient regained consciousness in ED 30 mins later. Patient has hx of syncopal episodes when standing up, which was thought to be due to orthostatic hypotension. Daughter reports that patient frequently has these episodes, but was told by her PCP not to call EMS unless pt remained out for prolonged period of time.

## 2019-09-04 ENCOUNTER — TRANSCRIPTION ENCOUNTER (OUTPATIENT)
Age: 84
End: 2019-09-04

## 2019-09-04 LAB
ANION GAP SERPL CALC-SCNC: 12 MMOL/L — SIGNIFICANT CHANGE UP (ref 5–17)
BUN SERPL-MCNC: 38 MG/DL — HIGH (ref 7–23)
CALCIUM SERPL-MCNC: 10.2 MG/DL — SIGNIFICANT CHANGE UP (ref 8.4–10.5)
CHLORIDE SERPL-SCNC: 105 MMOL/L — SIGNIFICANT CHANGE UP (ref 96–108)
CO2 SERPL-SCNC: 25 MMOL/L — SIGNIFICANT CHANGE UP (ref 22–31)
CREAT SERPL-MCNC: 1.37 MG/DL — HIGH (ref 0.5–1.3)
GLUCOSE SERPL-MCNC: 110 MG/DL — HIGH (ref 70–99)
HCT VFR BLD CALC: 30.1 % — LOW (ref 39–50)
HGB BLD-MCNC: 9.6 G/DL — LOW (ref 13–17)
MCHC RBC-ENTMCNC: 31.9 GM/DL — LOW (ref 32–36)
MCHC RBC-ENTMCNC: 32.7 PG — SIGNIFICANT CHANGE UP (ref 27–34)
MCV RBC AUTO: 102.4 FL — HIGH (ref 80–100)
PLATELET # BLD AUTO: 171 K/UL — SIGNIFICANT CHANGE UP (ref 150–400)
POTASSIUM SERPL-MCNC: 3.6 MMOL/L — SIGNIFICANT CHANGE UP (ref 3.5–5.3)
POTASSIUM SERPL-SCNC: 3.6 MMOL/L — SIGNIFICANT CHANGE UP (ref 3.5–5.3)
RBC # BLD: 2.94 M/UL — LOW (ref 4.2–5.8)
RBC # FLD: 12.9 % — SIGNIFICANT CHANGE UP (ref 10.3–14.5)
SODIUM SERPL-SCNC: 142 MMOL/L — SIGNIFICANT CHANGE UP (ref 135–145)
WBC # BLD: 4.14 K/UL — SIGNIFICANT CHANGE UP (ref 3.8–10.5)
WBC # FLD AUTO: 4.14 K/UL — SIGNIFICANT CHANGE UP (ref 3.8–10.5)

## 2019-09-04 PROCEDURE — 99233 SBSQ HOSP IP/OBS HIGH 50: CPT

## 2019-09-04 RX ORDER — MIDODRINE HYDROCHLORIDE 2.5 MG/1
1 TABLET ORAL
Qty: 0 | Refills: 0 | DISCHARGE

## 2019-09-04 RX ORDER — MELOXICAM 15 MG/1
1 TABLET ORAL
Qty: 0 | Refills: 0 | DISCHARGE

## 2019-09-04 RX ORDER — CHOLECALCIFEROL (VITAMIN D3) 125 MCG
1000 CAPSULE ORAL
Qty: 30 | Refills: 0
Start: 2019-09-04 | End: 2019-10-03

## 2019-09-04 RX ORDER — METOPROLOL TARTRATE 50 MG
1 TABLET ORAL
Qty: 30 | Refills: 0
Start: 2019-09-04 | End: 2019-10-03

## 2019-09-04 RX ORDER — ASPIRIN/CALCIUM CARB/MAGNESIUM 324 MG
1 TABLET ORAL
Qty: 0 | Refills: 0 | DISCHARGE

## 2019-09-04 RX ORDER — LACOSAMIDE 50 MG/1
1 TABLET ORAL
Qty: 60 | Refills: 0
Start: 2019-09-04 | End: 2019-10-03

## 2019-09-04 RX ADMIN — LACOSAMIDE 50 MILLIGRAM(S): 50 TABLET ORAL at 17:36

## 2019-09-04 RX ADMIN — AMLODIPINE BESYLATE 10 MILLIGRAM(S): 2.5 TABLET ORAL at 05:47

## 2019-09-04 RX ADMIN — LACOSAMIDE 50 MILLIGRAM(S): 50 TABLET ORAL at 05:47

## 2019-09-04 RX ADMIN — Medication 25 MILLIGRAM(S): at 05:47

## 2019-09-04 RX ADMIN — Medication 1000 UNIT(S): at 11:18

## 2019-09-04 RX ADMIN — ATORVASTATIN CALCIUM 40 MILLIGRAM(S): 80 TABLET, FILM COATED ORAL at 21:19

## 2019-09-04 RX ADMIN — FLUDROCORTISONE ACETATE 0.1 MILLIGRAM(S): 0.1 TABLET ORAL at 05:48

## 2019-09-04 NOTE — DISCHARGE NOTE PROVIDER - HOSPITAL COURSE
85 y/o M with PMHx of dementia (AAOx1-2 at baseline), orthostatic hypotension (on fludrocortisone), seizures, prostate Ca, who presents as a transfer from [facility] for neurosurgical eval after SDH found on imaging. As per daughter, patient had an episode of unresponsiveness lasting 30 minutes. Patient was accompanied by HHA at the time, who was assisting patient with cleaning when patient passed out. Patient was sitting down at the time. No seizure-like activity while pt was out.         Hospital Course:     Patient was seen by Neuro/Nsx at NS with repeat CT head showing stable SDH. no surgical intervention was recommended and SDH likely more chronic in appearance. EEG, echo and PPM interrogation were performed as well without acute findings. Home med of Keppra was switched to Vimpat as per Neuro recommendation. Given h/o orthostatic hypotension some concerns were given to possible orthostatic related syncope. S+S eval also was performed given MS and SDH and diet recommendation was made (dysphagia I with honey) Patient was seen by palliative and hospice care and home hospice was set up as per discussion with family.

## 2019-09-04 NOTE — GOALS OF CARE CONVERSATION - PERSONAL ADVANCE DIRECTIVE - CONVERSATION DETAILS
[facility] RN Note    RN spoke with pts dtr who agreed to have DME (bed w/mattress, O2) delivered today between 2-5pm.       Ya Borges RN

## 2019-09-04 NOTE — DISCHARGE NOTE PROVIDER - NSDCFUSCHEDAPPT_GEN_ALL_CORE_FT
YUMIKO CONTRERAS ; 09/19/2019 ; P [employer] [phone] YUMIKO CONTRERAS ; 09/19/2019 ; P [employer] [phone] YUMIKO CONTRERAS ; 09/19/2019 ; P [employer] [phone]

## 2019-09-04 NOTE — PROGRESS NOTE ADULT - PROBLEM SELECTOR PLAN 1
AAOx 1, mental status unchanged, Ct brain showed left frontoparietal chronic subdural hematoma, w/ mass effect on the left frontal lobe and rightward midline shift.  Repeat CT scan stable  - Evaluated by NSG, no acute surgical interventions.   - appreciated Neurology input. EEG showed no epileptic spikes. On VIMPAT 50mg   bid per neurology  - S & S evaluated, dysphagia diet I with honey  - aspiration precaution, CXR no pneumonia  - He is DNR,   - Palliative/hospice eval appreciated. plan for home hospice

## 2019-09-04 NOTE — DISCHARGE NOTE PROVIDER - NSDCCPCAREPLAN_GEN_ALL_CORE_FT
PRINCIPAL DISCHARGE DIAGNOSIS  Diagnosis: Syncope and collapse  Assessment and Plan of Treatment: You were noted to have bleeding in your head on imaging.   Repeat imagings did not show any worsening of the bleed.   No surgery was recommended given stable findings.   You were seen by Neuro and neurosurg team   Please cont with medication as directed.   If any symptoms return please seek immediate medical attention      SECONDARY DISCHARGE DIAGNOSES  Diagnosis: Orthostatic hypertension  Assessment and Plan of Treatment: You have a history of low BP when sitting up or standing.   Please cont your meds as directed.  Avoid sudden changes in position.  Ensure adequate oral intake.    Diagnosis: SDH (subdural hematoma)  Assessment and Plan of Treatment: You were noted to have bleeding in your head.   You were seen by Neuro and neurosurg team   Repeat imagings did not show any worsening of the bleed.   No surgery was recommended given stable findings.

## 2019-09-04 NOTE — DISCHARGE NOTE PROVIDER - PROVIDER TOKENS
FREE:[LAST:[Ramdamonpal],FIRST:[Lupe],PHONE:[(   )    -],FAX:[(   )    -],ADDRESS:[PMD],FOLLOWUP:[Routine]],FREE:[LAST:[[facility] MD],PHONE:[(   )    -],FAX:[(   )    -],FOLLOWUP:[Routine]]

## 2019-09-05 ENCOUNTER — TRANSCRIPTION ENCOUNTER (OUTPATIENT)
Age: 84
End: 2019-09-05

## 2019-09-05 VITALS
SYSTOLIC BLOOD PRESSURE: 131 MMHG | TEMPERATURE: 98 F | OXYGEN SATURATION: 94 % | DIASTOLIC BLOOD PRESSURE: 70 MMHG | RESPIRATION RATE: 18 BRPM | HEART RATE: 63 BPM

## 2019-09-05 PROCEDURE — 81001 URINALYSIS AUTO W/SCOPE: CPT

## 2019-09-05 PROCEDURE — 70450 CT HEAD/BRAIN W/O DYE: CPT

## 2019-09-05 PROCEDURE — 97110 THERAPEUTIC EXERCISES: CPT

## 2019-09-05 PROCEDURE — 86850 RBC ANTIBODY SCREEN: CPT

## 2019-09-05 PROCEDURE — 83735 ASSAY OF MAGNESIUM: CPT

## 2019-09-05 PROCEDURE — 74230 X-RAY XM SWLNG FUNCJ C+: CPT

## 2019-09-05 PROCEDURE — 83519 RIA NONANTIBODY: CPT

## 2019-09-05 PROCEDURE — 97162 PT EVAL MOD COMPLEX 30 MIN: CPT

## 2019-09-05 PROCEDURE — 86901 BLOOD TYPING SEROLOGIC RH(D): CPT

## 2019-09-05 PROCEDURE — 82652 VIT D 1 25-DIHYDROXY: CPT

## 2019-09-05 PROCEDURE — 85610 PROTHROMBIN TIME: CPT

## 2019-09-05 PROCEDURE — 99285 EMERGENCY DEPT VISIT HI MDM: CPT

## 2019-09-05 PROCEDURE — C8929: CPT

## 2019-09-05 PROCEDURE — 84100 ASSAY OF PHOSPHORUS: CPT

## 2019-09-05 PROCEDURE — 82746 ASSAY OF FOLIC ACID SERUM: CPT

## 2019-09-05 PROCEDURE — 71045 X-RAY EXAM CHEST 1 VIEW: CPT

## 2019-09-05 PROCEDURE — 85730 THROMBOPLASTIN TIME PARTIAL: CPT

## 2019-09-05 PROCEDURE — 93005 ELECTROCARDIOGRAM TRACING: CPT

## 2019-09-05 PROCEDURE — 80177 DRUG SCRN QUAN LEVETIRACETAM: CPT

## 2019-09-05 PROCEDURE — 92610 EVALUATE SWALLOWING FUNCTION: CPT

## 2019-09-05 PROCEDURE — 82310 ASSAY OF CALCIUM: CPT

## 2019-09-05 PROCEDURE — 85027 COMPLETE CBC AUTOMATED: CPT

## 2019-09-05 PROCEDURE — 99239 HOSP IP/OBS DSCHRG MGMT >30: CPT

## 2019-09-05 PROCEDURE — 86900 BLOOD TYPING SEROLOGIC ABO: CPT

## 2019-09-05 PROCEDURE — 95951: CPT

## 2019-09-05 PROCEDURE — 97530 THERAPEUTIC ACTIVITIES: CPT

## 2019-09-05 PROCEDURE — 80053 COMPREHEN METABOLIC PANEL: CPT

## 2019-09-05 PROCEDURE — 92611 MOTION FLUOROSCOPY/SWALLOW: CPT

## 2019-09-05 PROCEDURE — 95816 EEG AWAKE AND DROWSY: CPT

## 2019-09-05 PROCEDURE — C9254: CPT

## 2019-09-05 PROCEDURE — 82607 VITAMIN B-12: CPT

## 2019-09-05 PROCEDURE — 80048 BASIC METABOLIC PNL TOTAL CA: CPT

## 2019-09-05 PROCEDURE — 82306 VITAMIN D 25 HYDROXY: CPT

## 2019-09-05 PROCEDURE — 83970 ASSAY OF PARATHORMONE: CPT

## 2019-09-05 RX ORDER — METOPROLOL TARTRATE 50 MG
1 TABLET ORAL
Qty: 30 | Refills: 0
Start: 2019-09-05 | End: 2019-10-04

## 2019-09-05 RX ORDER — LACOSAMIDE 50 MG/1
1 TABLET ORAL
Qty: 60 | Refills: 0
Start: 2019-09-05 | End: 2019-10-04

## 2019-09-05 RX ORDER — AMLODIPINE BESYLATE 2.5 MG/1
1 TABLET ORAL
Qty: 0 | Refills: 0 | DISCHARGE

## 2019-09-05 RX ORDER — FLUDROCORTISONE ACETATE 0.1 MG/1
1 TABLET ORAL
Qty: 30 | Refills: 0
Start: 2019-09-05 | End: 2019-10-04

## 2019-09-05 RX ORDER — AMLODIPINE BESYLATE 2.5 MG/1
1 TABLET ORAL
Qty: 30 | Refills: 0
Start: 2019-09-05 | End: 2019-10-04

## 2019-09-05 RX ADMIN — LACOSAMIDE 50 MILLIGRAM(S): 50 TABLET ORAL at 06:26

## 2019-09-05 RX ADMIN — FLUDROCORTISONE ACETATE 0.1 MILLIGRAM(S): 0.1 TABLET ORAL at 06:26

## 2019-09-05 RX ADMIN — AMLODIPINE BESYLATE 10 MILLIGRAM(S): 2.5 TABLET ORAL at 06:25

## 2019-09-05 RX ADMIN — Medication 1000 UNIT(S): at 12:31

## 2019-09-05 RX ADMIN — Medication 25 MILLIGRAM(S): at 06:25

## 2019-09-05 NOTE — PROGRESS NOTE ADULT - PROBLEM SELECTOR PLAN 7
He is DNR. Palliative care to speak to his daughter Leticia   - c/w current meds, on enhanced supervision
He is DNR.   - c/w current meds, on enhanced supervision
patient bedbound dependent on all ADL's
As above   avoid sedative medications, continue with supportive care
He is DNR.   - c/w current meds, on enhanced supervision
Need to discuss with family prior treatment and current status of disease process.
Need to discuss with family prior treatment and current status of disease process.
Spoke to Dtr and wife, He is DNR. Family wants to consult with Palliative/Hospice  - c/w current meds
Spoke to Dtr and wife, He is DNR. Palliative care will speak to his daughter Leticia tomorrow  - c/w current meds, on enhanced supervision
patient bedbound dependent on all ADL's

## 2019-09-05 NOTE — PROGRESS NOTE ADULT - SUBJECTIVE AND OBJECTIVE BOX
Brittany Harris MD  Attending Physician (Hospitalist)  pager: [phone]    Patient is a 86y old  Male who presents with a chief complaint of unresponsiveness (04 Sep 2019 15:17)        SUBJECTIVE / OVERNIGHT EVENTS:  awake and responsive. denies any complaints. confused.  no acute overnight issues. afebrile.     MEDICATIONS  (STANDING):  amLODIPine   Tablet 10 milliGRAM(s) Oral daily  atorvastatin 40 milliGRAM(s) Oral at bedtime  cholecalciferol 1000 Unit(s) Oral daily  fludroCORTISONE 0.1 milliGRAM(s) Oral daily  lacosamide 50 milliGRAM(s) Oral two times a day  metoprolol succinate ER 25 milliGRAM(s) Oral daily    MEDICATIONS  (PRN):      Vital Signs Last 24 Hrs  T(C): 36.5 (05 Sep 2019 12:35), Max: 36.6 (04 Sep 2019 21:04)  T(F): 97.7 (05 Sep 2019 12:35), Max: 97.8 (04 Sep 2019 21:04)  HR: 63 (05 Sep 2019 12:35) (60 - 87)  BP: 131/70 (05 Sep 2019 12:35) (131/70 - 162/76)  BP(mean): --  RR: 18 (05 Sep 2019 12:35) (18 - 18)  SpO2: 94% (05 Sep 2019 12:35) (94% - 95%)  CAPILLARY BLOOD GLUCOSE        I&O's Summary    04 Sep 2019 07:01  -  05 Sep 2019 07:00  --------------------------------------------------------  IN: 760 mL / OUT: 0 mL / NET: 760 mL    05 Sep 2019 07:01  -  05 Sep 2019 15:04  --------------------------------------------------------  IN: 120 mL / OUT: 0 mL / NET: 120 mL          PHYSICAL EXAM  GENERAL: NAD, well-developed  HEAD:  Atraumatic, Normocephalic  EYES: EOMI, conjunctiva and sclera clear  NECK: Supple, No JVD  CHEST/LUNG: Clear to auscultation bilaterally; No wheeze  HEART: Regular rate and rhythm; No murmurs, rubs, or gallops  ABDOMEN: Soft, Nontender, Nondistended; Bowel sounds present  EXTREMITIES:  2+ Peripheral Pulses, No clubbing, cyanosis, or edema  PSYCH: AAOx1  SKIN: No rashes or lesions  Neuro: limited due to MS. moving all extremities spontaneously.    LABS:                        9.6    4.14  )-----------( 171      ( 04 Sep 2019 08:51 )             30.1     09-04    142  |  105  |  38<H>  ----------------------------<  110<H>  3.6   |  25  |  1.37<H>    Ca    10.2      04 Sep 2019 05:55                  RADIOLOGY & ADDITIONAL TESTS:    Imaging Personally Reviewed:  Consultant(s) Notes Reviewed:    Care Discussed with Consultants/Other Providers:
Mohsin Khan, MD  Attending Physician, Division Of Hospital Medicine  Pager: [phone], Office: [phone]      Patient is a 86y old  Male who presents with a chief complaint of unresponsiveness     SUBJECTIVE / OVERNIGHT EVENTS:  Resting in bed, no acute distress no fever tolerating food well, no cough  /76, T- 98F, O2- 99%      MEDICATIONS  (STANDING):  amLODIPine   Tablet 10 milliGRAM(s) Oral daily  atorvastatin 40 milliGRAM(s) Oral at bedtime  cholecalciferol 1000 Unit(s) Oral daily  fludroCORTISONE 0.1 milliGRAM(s) Oral daily  lacosamide 50 milliGRAM(s) Oral two times a day  metoprolol succinate ER 25 milliGRAM(s) Oral daily  sodium chloride 0.45%. 1000 milliLiter(s) (70 mL/Hr) IV Continuous <Continuous>    MEDICATIONS  (PRN):      Vital Signs Last 24 Hrs  T(C): 36.4 (01 Sep 2019 04:19), Max: 37 (31 Aug 2019 20:10)  T(F): 97.5 (01 Sep 2019 04:19), Max: 98.6 (31 Aug 2019 20:10)  HR: 88 (01 Sep 2019 09:59) (61 - 88)  BP: 174/75 (01 Sep 2019 09:59) (122/71 - 174/75)  BP(mean): --  RR: 17 (01 Sep 2019 04:19) (17 - 18)  SpO2: 95% (01 Sep 2019 04:19) (95% - 100%)  CAPILLARY BLOOD GLUCOSE        I&O's Summary    31 Aug 2019 07:01  -  01 Sep 2019 07:00  --------------------------------------------------------  IN: 240 mL / OUT: 0 mL / NET: 240 mL    01 Sep 2019 07:01  -  01 Sep 2019 13:14  --------------------------------------------------------  IN: 120 mL / OUT: 0 mL / NET: 120 mL        PHYSICAL EXAM:-  GENERAL: NAD, well-developed  EYES: EOMI, PERRLA, conjunctiva and sclera clear  NECK: Supple, No JVD, no thyromegaly  CHEST/LUNG: rhonchi at bases to auscultation bilaterally; No wheeze  HEART: Regular rate and rhythm; S1, S2 audible, No murmurs, rubs, or gallops  ABDOMEN: Soft, Nontender, Nondistended; Bowel sounds present  EXTREMITIES:  2+ Peripheral Pulses, No clubbing, cyanosis, or edema  NEURO: AAOx1,move all ext,       LABS:                        9.8    3.21  )-----------( 129      ( 31 Aug 2019 16:09 )             31.0     09-01    144  |  105  |  32<H>  ----------------------------<  108<H>  3.8   |  26  |  1.46<H>    Ca    10.5      01 Sep 2019 06:24    RADIOLOGY & ADDITIONAL TESTS:    Imaging Personally Reviewed: CT brain, CXR  Consultant(s) Notes Reviewed:  Neuro, NeuroSx  Care Discussed with Consultants/Other Providers: Neuro, NeuroSx
Patient is a 86y old  Male who presents with a chief complaint of unresponsiveness (28 Aug 2019 14:59)      INTERVAL HPI/OVERNIGHT EVENTS:  patient with no acute agitation events overnight.  Off one to one         Review of Systems: Unable to assess due to current medical condition.    ( - )fevers/chills  ( - ) dyspnea  ( - ) cough  ( - ) chest pain  ( - ) palpatations  ( - ) dizziness/lightheadedness  ( - ) nausea/vomiting  ( - ) abd pain  ( - ) diarrhea  ( - ) melena  ( - ) hematochezia  ( - ) dysuria  ( - ) hematuria  ( - ) leg swelling  ( -) calf tenderness  ( - ) motor weakness  ( - ) extremity numbness  ( - ) back pain  ( + ) tolerating POs  ( + ) BM    MEDICATIONS  (STANDING):  atorvastatin 40 milliGRAM(s) Oral at bedtime  fludroCORTISONE 0.1 milliGRAM(s) Oral daily  hydrALAZINE Injectable 5 milliGRAM(s) IV Push every 8 hours  lactated ringers. 1000 milliLiter(s) (60 mL/Hr) IV Continuous <Continuous>  levETIRAcetam  IVPB 500 milliGRAM(s) IV Intermittent every 12 hours    MEDICATIONS  (PRN):      Allergies    No Known Allergies    Intolerances          Vital Signs Last 24 Hrs  T(C): 36.7 (29 Aug 2019 03:50), Max: 37 (29 Aug 2019 00:28)  T(F): 98.1 (29 Aug 2019 03:50), Max: 98.6 (29 Aug 2019 00:28)  HR: 82 (29 Aug 2019 03:50) (70 - 86)  BP: 158/75 (29 Aug 2019 03:50) (156/69 - 167/77)  BP(mean): --  RR: 18 (29 Aug 2019 03:50) (17 - 18)  SpO2: 96% (29 Aug 2019 03:50) (93% - 96%)  CAPILLARY BLOOD GLUCOSE          08-28 @ 07:01  -  08-29 @ 07:00  --------------------------------------------------------  IN: 520 mL / OUT: 0 mL / NET: 520 mL        Physical Exam:    Daily Height in cm: 170.18 (28 Aug 2019 15:00)    Daily   General:  ill appearing   HEENT:  Nonicteric  CV:  RRR, no murmur  Lungs:  CTA B/L, no wheeze  Abdomen:  Soft, non-tender, no distended, positive BS,  Extremities:  no edema  Skin:  Warm and dry, no rashes  :  No fernandez  Neuro:  AAOx0,  moves all extremities spontaneously   No Restraints    LABS:                        9.7    4.10  )-----------( 167      ( 29 Aug 2019 08:16 )             30.2     08-29    138  |  103  |  29<H>  ----------------------------<  85  4.0   |  20<L>  |  1.23    Ca    10.5      29 Aug 2019 06:17  Phos  2.9     08-29  Mg     2.2     08-29              RADIOLOGY & ADDITIONAL TESTS:    ---------------------------------------------------------------------------  I personally reviewed: [  ]EKG   [  ]CXR    [  ] CT    [  ]Other  ---------------------------------------------------------------------------  PLEASE CHECK WHEN PRESENT:     [  ]Heart Failure     [  ] Acute     [  ] Acute on Chronic     [  ] Chronic  -------------------------------------------------------------------     [  ]Diastolic [HFpEF]     [  ]Systolic [HFrEF]     [  ]Combined [HFpEF & HFrEF]     [  ]Other:  -------------------------------------------------------------------  [  ]DESIRAE     [  ]ATN     [  ]Reneal Medullary Necrosis     [  ]Renal Cortical Necrosis     [  ]Other Pathological Lesions:    [  ]CKD 1  [  ]CKD 2  [  ]CKD 3  [  ]CKD 4  [  ]CKD 5  [  ]Other  -------------------------------------------------------------------  [  ]Other/Unspecified:    --------------------------------------------------------------------    Abdominal Nutritional Status  [  ]Malnutrition: See Nutrition Note  [  ]Cachexia  [  ]Other:   [  ]Supplement Ordered:  [  ]Morbid Obesity (BMI >=40]
Brittany Harris MD  Attending Physician (Hospitalist)  pager: [phone]    Patient is a 86y old  Male who presents with a chief complaint of unresponsiveness (03 Sep 2019 13:28)        SUBJECTIVE / OVERNIGHT EVENTS:  arousable but minimally verbal. no acute issues overnight   afebrile.     MEDICATIONS  (STANDING):  amLODIPine   Tablet 10 milliGRAM(s) Oral daily  atorvastatin 40 milliGRAM(s) Oral at bedtime  cholecalciferol 1000 Unit(s) Oral daily  fludroCORTISONE 0.1 milliGRAM(s) Oral daily  lacosamide 50 milliGRAM(s) Oral two times a day  metoprolol succinate ER 25 milliGRAM(s) Oral daily    MEDICATIONS  (PRN):      Vital Signs Last 24 Hrs  T(C): 36.4 (04 Sep 2019 11:29), Max: 36.8 (03 Sep 2019 21:18)  T(F): 97.5 (04 Sep 2019 11:29), Max: 98.2 (03 Sep 2019 21:18)  HR: 60 (04 Sep 2019 11:29) (60 - 61)  BP: 160/70 (04 Sep 2019 11:29) (143/68 - 160/70)  BP(mean): --  RR: 18 (04 Sep 2019 11:29) (18 - 18)  SpO2: 98% (04 Sep 2019 11:29) (98% - 100%)  CAPILLARY BLOOD GLUCOSE        I&O's Summary    03 Sep 2019 07:01  -  04 Sep 2019 07:00  --------------------------------------------------------  IN: 920 mL / OUT: 0 mL / NET: 920 mL    04 Sep 2019 07:01  -  04 Sep 2019 14:46  --------------------------------------------------------  IN: 480 mL / OUT: 0 mL / NET: 480 mL          PHYSICAL EXAM  GENERAL: NAD, well-developed  HEAD:  Atraumatic, Normocephalic  EYES: EOMI, conjunctiva and sclera clear  NECK: Supple, No JVD  CHEST/LUNG: Clear to auscultation bilaterally; No wheeze  HEART: Regular rate and rhythm; No murmurs, rubs, or gallops  ABDOMEN: Soft, Nontender, Nondistended; Bowel sounds present  EXTREMITIES:  2+ Peripheral Pulses, No clubbing, cyanosis, or edema  PSYCH: AAOx1  SKIN: No rashes or lesions  Neuro: limited due to MS. moving all extremities spontaneously.    LABS:                        9.6    4.14  )-----------( 171      ( 04 Sep 2019 08:51 )             30.1     09-04    142  |  105  |  38<H>  ----------------------------<  110<H>  3.6   |  25  |  1.37<H>    Ca    10.2      04 Sep 2019 05:55  Phos  3.2     09-03  Mg     2.3     09-03                  RADIOLOGY & ADDITIONAL TESTS:    Imaging Personally Reviewed:  Consultant(s) Notes Reviewed:    Care Discussed with Consultants/Other Providers:
Mohsin Khan, MD  Attending Physician, Division Of Hospital Medicine  Pager: [phone], Office: [phone]      Patient is a 86y old  Male who presents with a chief complaint of unresponsiveness     SUBJECTIVE / OVERNIGHT EVENTS:  Resting in bed, no c/o chest pain, HA, SOB. Has been afebrile, AAox2, Vitals- 148/79  Tele- SR in 70s      MEDICATIONS  (STANDING):  atorvastatin 40 milliGRAM(s) Oral at bedtime  fludroCORTISONE 0.1 milliGRAM(s) Oral daily  lacosamide 50 milliGRAM(s) Oral two times a day  lactated ringers. 1000 milliLiter(s) (60 mL/Hr) IV Continuous <Continuous>    MEDICATIONS  (PRN):      Vital Signs Last 24 Hrs  T(C): 36.7 (30 Aug 2019 06:08), Max: 36.8 (29 Aug 2019 14:29)  T(F): 98 (30 Aug 2019 06:08), Max: 98.2 (29 Aug 2019 14:29)  HR: 72 (30 Aug 2019 06:08) (65 - 72)  BP: 148/79 (30 Aug 2019 06:08) (140/80 - 151/79)  BP(mean): --  RR: 18 (30 Aug 2019 06:08) (17 - 18)  SpO2: 94% (30 Aug 2019 06:08) (94% - 97%)  CAPILLARY BLOOD GLUCOSE        I&O's Summary    29 Aug 2019 07:01  -  30 Aug 2019 07:00  --------------------------------------------------------  IN: 750 mL / OUT: 0 mL / NET: 750 mL        PHYSICAL EXAM:-  GENERAL: NAD, well-developed  EYES: EOMI, PERRLA, conjunctiva and sclera clear  NECK: Supple, No JVD, no thyromegaly  CHEST/LUNG: Clear to auscultation bilaterally; No wheeze  HEART: Regular rate and rhythm; S1, S2 audible, No murmurs, rubs, or gallops  ABDOMEN: Soft, Nontender, Nondistended; Bowel sounds present  EXTREMITIES:  2+ Peripheral Pulses, No clubbing, cyanosis, or edema  NEURO: AAOx2, no focal deficit      LABS:                        9.7    4.10  )-----------( 167      ( 29 Aug 2019 08:16 )             30.2     08-29    138  |  103  |  29<H>  ----------------------------<  85  4.0   |  20<L>  |  1.23    Ca    10.5      29 Aug 2019 06:17  Phos  2.9     08-29  Mg     2.2     08-29      RADIOLOGY & ADDITIONAL TESTS:    Imaging Personally Reviewed: CXR, EEG  Consultant(s) Notes Reviewed: Neurology   Care Discussed with Consultants/Other Providers: Neurology
Patient is a 86y old  Male who presents with a chief complaint of unresponsiveness (27 Aug 2019 11:29)      INTERVAL HPI/OVERNIGHT EVENTS:  no events overnight, patient unable to communicate       Review of Systems: UNABLE TO ASSESS DUE TO UNDERLYING MEDICAL CONDITION  ( - ) cough  ( - ) chest pain  ( - ) palpatations  ( - ) dizziness/lightheadedness  ( - ) nausea/vomiting  ( - ) abd pain  ( - ) diarrhea  ( - ) melena  ( - ) hematochezia  ( - ) dysuria  ( - ) hematuria  ( - ) leg swelling  ( -) calf tenderness  ( - ) motor weakness  ( - ) extremity numbness  ( - ) back pain  ( + ) tolerating POs  ( + ) BM    MEDICATIONS  (STANDING):  atorvastatin 40 milliGRAM(s) Oral at bedtime  fludroCORTISONE 0.1 milliGRAM(s) Oral daily  hydrALAZINE Injectable 5 milliGRAM(s) IV Push every 8 hours  levETIRAcetam  IVPB 500 milliGRAM(s) IV Intermittent every 12 hours    MEDICATIONS  (PRN):      Allergies    No Known Allergies    Intolerances          Vital Signs Last 24 Hrs  T(C): 37.1 (27 Aug 2019 11:45), Max: 37.1 (26 Aug 2019 20:30)  T(F): 98.8 (27 Aug 2019 11:45), Max: 98.8 (27 Aug 2019 11:45)  HR: 60 (27 Aug 2019 11:45) (60 - 84)  BP: 180/80 (27 Aug 2019 11:45) (106/66 - 180/80)  BP(mean): 104 (26 Aug 2019 22:17) (78 - 104)  RR: 16 (27 Aug 2019 11:45) (15 - 19)  SpO2: 99% (27 Aug 2019 11:45) (96% - 100%)  CAPILLARY BLOOD GLUCOSE           @ 07:01  -   @ 14:02  --------------------------------------------------------  IN: 0 mL / OUT: 200 mL / NET: -200 mL        Physical Exam:    Daily     Daily   General:  NAD  HEENT:  Nonicteric, EOMI  CV:  RRR, no murmur, pacemaker in place   Lungs:  CTA B/L, no wheeze  Abdomen:  Soft, non-tender, no distended, positive BS,  Extremities:  no edema  Skin:  Warm and dry, no rashes  :  No fernandez  Neuro:  AAOx0  No Restraints    LABS:                        10.8   3.9   )-----------( 116      ( 27 Aug 2019 12:17 )             33.0     08-    139  |  104  |  29<H>  ----------------------------<  93  4.3   |  24  |  1.44<H>    Ca    10.7<H>      27 Aug 2019 12:21  Phos  3.1       Mg     2.4         TPro  6.8  /  Alb  3.7  /  TBili  1.3<H>  /  DBili  x   /  AST  19  /  ALT  12  /  AlkPhos  120      PT/INR - ( 26 Aug 2019 17:10 )   PT: 11.2 sec;   INR: 0.97 ratio         PTT - ( 26 Aug 2019 17:10 )  PTT:31.4 sec  Urinalysis Basic - ( 27 Aug 2019 12:18 )    Color: Yellow / Appearance: Clear / S.020 / pH: x  Gluc: x / Ketone: Trace  / Bili: Negative / Urobili: Negative   Blood: x / Protein: Trace / Nitrite: Negative   Leuk Esterase: Negative / RBC: 2 /hpf / WBC 1 /HPF   Sq Epi: x / Non Sq Epi: 2 /hpf / Bacteria: Negative          RADIOLOGY & ADDITIONAL TESTS:    ---------------------------------------------------------------------------  I personally reviewed: [  ]EKG   [  ]CXR    [  ] CT    [  ]Other  ---------------------------------------------------------------------------  PLEASE CHECK WHEN PRESENT:     [  ]Heart Failure     [  ] Acute     [  ] Acute on Chronic     [  ] Chronic  -------------------------------------------------------------------     [  ]Diastolic [HFpEF]     [  ]Systolic [HFrEF]     [  ]Combined [HFpEF & HFrEF]     [  ]Other:  -------------------------------------------------------------------  [  ]DESIRAE     [  ]ATN     [  ]Reneal Medullary Necrosis     [  ]Renal Cortical Necrosis     [  ]Other Pathological Lesions:    [  ]CKD 1  [  ]CKD 2  [  ]CKD 3  [  ]CKD 4  [  ]CKD 5  [  ]Other  -------------------------------------------------------------------  [  ]Other/Unspecified:    --------------------------------------------------------------------    Abdominal Nutritional Status  [  ]Malnutrition: See Nutrition Note  [  ]Cachexia  [  ]Other:   [  ]Supplement Ordered:  [  ]Morbid Obesity (BMI >=40]
p (8600)     HPI: 86M PMHx dementia, prostate Ca, hypotension, seizures, transferred from [place] with SDH. Patient was found down at home by home health aide. Not on AC, does take ASA 81, last was yesterday. CT head shows L parietal chronic SDH, ~1.2 cm at thickest part.    Imaging:    Exam: AAOx0 (although did greet me with "hey doc"), follows some commands (squeeze fingers, move feet/toes) does not follow others (arms up for drift, smile). When following, strength is 5/5.      --Anticoagulation:    =====================  PAST MEDICAL HISTORY   Seizure  Orthostatic hypotension  Pacemaker  HTN (hypertension)  Prostate ca    PAST SURGICAL HISTORY   Pacemaker        MEDICATIONS:  Antibiotics:    Neuro:    Other:      SOCIAL HISTORY:   Occupation:   Marital Status:     FAMILY HISTORY:  No pertinent family history in first degree relatives      ROS: Negative except per HPI    LABS:  PT/INR - ( 26 Aug 2019 17:10 )   PT: 11.2 sec;   INR: 0.97 ratio         PTT - ( 26 Aug 2019 17:10 )  PTT:31.4 sec                        12.3   4.7   )-----------( 156      ( 26 Aug 2019 17:10 )             38.2     08-26    140  |  103  |  26<H>  ----------------------------<  102<H>  5.7<H>   |  24  |  1.49<H>    Ca    10.6<H>      26 Aug 2019 17:10    TPro  7.4  /  Alb  4.2  /  TBili  0.8  /  DBili  x   /  AST  31  /  ALT  13  /  AlkPhos  123<H>  08-26
Mohsin Khan, MD  Attending Physician, Division Of Hospital Medicine  Pager: [phone], Office: [phone]      Patient is a 86y old  Male who presents with a chief complaint of unresponsiveness    SUBJECTIVE / OVERNIGHT EVENTS:  Sitting in bed, no acute distress, no SOB. remains afebrile, AAOx 1  /75, T-98.4F, O2 98%. Tele- A- paced 60-90s      MEDICATIONS  (STANDING):  amLODIPine   Tablet 10 milliGRAM(s) Oral daily  atorvastatin 40 milliGRAM(s) Oral at bedtime  cholecalciferol 1000 Unit(s) Oral daily  fludroCORTISONE 0.1 milliGRAM(s) Oral daily  lacosamide 50 milliGRAM(s) Oral two times a day  metoprolol succinate ER 25 milliGRAM(s) Oral daily  sodium chloride 0.45%. 1000 milliLiter(s) (70 mL/Hr) IV Continuous <Continuous>    MEDICATIONS  (PRN):      Vital Signs Last 24 Hrs  T(C): 36.9 (02 Sep 2019 05:45), Max: 36.9 (02 Sep 2019 05:45)  T(F): 98.4 (02 Sep 2019 05:45), Max: 98.4 (02 Sep 2019 05:45)  HR: 62 (02 Sep 2019 05:45) (62 - 88)  BP: 155/75 (02 Sep 2019 05:45) (139/69 - 179/86)  BP(mean): --  RR: 18 (02 Sep 2019 05:45) (18 - 19)  SpO2: 98% (02 Sep 2019 05:45) (97% - 100%)  CAPILLARY BLOOD GLUCOSE        I&O's Summary    01 Sep 2019 07:01  -  02 Sep 2019 07:00  --------------------------------------------------------  IN: 660 mL / OUT: 0 mL / NET: 660 mL        PHYSICAL EXAM:-  GENERAL: NAD, well-developed  EYES: EOMI, PERRLA, conjunctiva and sclera clear  NECK: Supple, No JVD, no thyromegaly  CHEST/LUNG: Clear to auscultation bilaterally; No wheeze  HEART: Regular rate and rhythm; S1, S2 audible, No murmurs, rubs, or gallops  ABDOMEN: Soft, Nontender, Nondistended; Bowel sounds present  EXTREMITIES:  2+ Peripheral Pulses, No clubbing, cyanosis, or edema  NEURO: AAOx1, demented      LABS:                        9.8    3.21  )-----------( 129      ( 31 Aug 2019 16:09 )             31.0     09-01    144  |  105  |  32<H>  ----------------------------<  108<H>  3.8   |  26  |  1.46<H>    Ca    10.5      01 Sep 2019 06:24      RADIOLOGY & ADDITIONAL TESTS:    Imaging Personally Reviewed: CT brain, CXR  Consultant(s) Notes Reviewed: Neuro, card  Care Discussed with Consultants/Other Providers: Neuro, Card
Patient is a 86y old  Male who presents with a chief complaint of unresponsiveness (27 Aug 2019 14:02)      INTERVAL HPI/OVERNIGHT EVENTS:  Patient needed one to one last night and mittens.  Unable to access further due to medical condition.          Review of Systems: Unable to access due to current medical condition.    ( - )fevers/chills  ( - ) dyspnea  ( - ) cough  ( - ) chest pain  ( - ) palpatations  ( - ) dizziness/lightheadedness  ( - ) nausea/vomiting  ( - ) abd pain  ( - ) diarrhea  ( - ) melena  ( - ) hematochezia  ( - ) dysuria  ( - ) hematuria  ( - ) leg swelling  ( -) calf tenderness  ( - ) motor weakness  ( - ) extremity numbness  ( - ) back pain  ( + ) tolerating POs  ( + ) BM    MEDICATIONS  (STANDING):  atorvastatin 40 milliGRAM(s) Oral at bedtime  fludroCORTISONE 0.1 milliGRAM(s) Oral daily  hydrALAZINE Injectable 5 milliGRAM(s) IV Push every 8 hours  levETIRAcetam  IVPB 500 milliGRAM(s) IV Intermittent every 12 hours  sodium chloride 0.9%. 1000 milliLiter(s) (60 mL/Hr) IV Continuous <Continuous>    MEDICATIONS  (PRN):      Allergies    No Known Allergies    Intolerances          Vital Signs Last 24 Hrs  T(C): 36.9 (28 Aug 2019 05:12), Max: 36.9 (28 Aug 2019 05:12)  T(F): 98.4 (28 Aug 2019 05:12), Max: 98.4 (28 Aug 2019 05:12)  HR: 68 (28 Aug 2019 05:12) (62 - 68)  BP: 133/66 (28 Aug 2019 05:12) (133/66 - 172/88)  BP(mean): --  RR: 17 (28 Aug 2019 05:12) (16 - 17)  SpO2: 94% (28 Aug 2019 05:12) (94% - 94%)  CAPILLARY BLOOD GLUCOSE           @ 07:01  -   @ 07:00  --------------------------------------------------------  IN: 520 mL / OUT: 200 mL / NET: 320 mL        Physical Exam:    Daily     Daily   General:  mittens placed on hands.  opens eyes and mumbles to touch  HEENT:  Nonicteric, PERRLA  CV:  RRR, no murmur  Lungs:  CTA B/L, no wheeze  Abdomen:  Soft, non-tender, no distended, positive BS,  Extremities:  no edema  Skin:  Warm and dry, no rashes  :  No fernandez  Neuro:  AAOx0,  moves all 4 extremities spontaneously   No Restraints    LABS:                        10.7   4.9   )-----------( 121      ( 28 Aug 2019 12:12 )             31.9     08-    144  |  102  |  24<H>  ----------------------------<  95  4.4   |  27  |  1.19    Ca    10.9<H>      28 Aug 2019 12:12  Phos  3.0       Mg     2.3     -    TPro  6.8  /  Alb  3.7  /  TBili  1.3<H>  /  DBili  x   /  AST  19  /  ALT  12  /  AlkPhos  120  -    PT/INR - ( 26 Aug 2019 17:10 )   PT: 11.2 sec;   INR: 0.97 ratio         PTT - ( 26 Aug 2019 17:10 )  PTT:31.4 sec  Urinalysis Basic - ( 27 Aug 2019 12:18 )    Color: Yellow / Appearance: Clear / S.020 / pH: x  Gluc: x / Ketone: Trace  / Bili: Negative / Urobili: Negative   Blood: x / Protein: Trace / Nitrite: Negative   Leuk Esterase: Negative / RBC: 2 /hpf / WBC 1 /HPF   Sq Epi: x / Non Sq Epi: 2 /hpf / Bacteria: Negative          RADIOLOGY & ADDITIONAL TESTS:    ---------------------------------------------------------------------------  I personally reviewed: [  ]EKG   [  ]CXR    [  ] CT    [  ]Other  ---------------------------------------------------------------------------  PLEASE CHECK WHEN PRESENT:     [  ]Heart Failure     [  ] Acute     [  ] Acute on Chronic     [  ] Chronic  -------------------------------------------------------------------     [  ]Diastolic [HFpEF]     [  ]Systolic [HFrEF]     [  ]Combined [HFpEF & HFrEF]     [  ]Other:  -------------------------------------------------------------------  [  ]DESIRAE     [  ]ATN     [  ]Reneal Medullary Necrosis     [  ]Renal Cortical Necrosis     [  ]Other Pathological Lesions:    [  ]CKD 1  [  ]CKD 2  [  ]CKD 3  [  ]CKD 4  [  ]CKD 5  [  ]Other  -------------------------------------------------------------------  [  ]Other/Unspecified:    --------------------------------------------------------------------    Abdominal Nutritional Status  [  ]Malnutrition: See Nutrition Note  [  ]Cachexia  [  ]Other:   [  ]Supplement Ordered:  [  ]Morbid Obesity (BMI >=40]
EPILEPSY FOLLOWUP CONSULT NOTE    INTERVAL HX:  Mentation much better this afternoon. Family notices irritability with LEV use at home.           HPI:  Patient is an 86M with a history of dementia, orthostatic hypotension on fludrocortisone, seizure disorder on Keppra, hx of chronic subdural hematoma who presents as a transfer from [facility] for evaluation of SDH. Patient is AOx1-2 at baseline, needs assistance with ADLs and was with his HHA at home when he suddenly passed out while he was sitting in a chair and become altered and unresponsive, but did not fall or hit his head. He was taken to [facility] where he was found to have a SDH and was transferred to [facility]. Patient has a history of syncopal events in the past that were thought to be secondary to orthostatic hypotension. 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 He was previously admitted here in Dec 2018 after a fall resulting in a SDH. He has had prior subdurals due to falls in the past and was put of Keppra due to these. His daughter reported that his mental status has been declining since his last hospitalization in 2018. Neurology was consulted to rule-out seizure as the cause of his fall.     MEDICATIONS  (STANDING):  atorvastatin 40 milliGRAM(s) Oral at bedtime  fludroCORTISONE 0.1 milliGRAM(s) Oral daily  hydrALAZINE Injectable 5 milliGRAM(s) IV Push every 8 hours  lacosamide 50 milliGRAM(s) Oral two times a day  lactated ringers. 1000 milliLiter(s) (60 mL/Hr) IV Continuous <Continuous>  levETIRAcetam  IVPB 250 milliGRAM(s) IV Intermittent every 12 hours      FAMILY HISTORY:  No pertinent family history in first degree relatives    Allergies    No Known Allergies      Vital Signs Last 24 Hrs  T(C): 36.8 (29 Aug 2019 14:29), Max: 37 (29 Aug 2019 00:28)  T(F): 98.2 (29 Aug 2019 14:29), Max: 98.6 (29 Aug 2019 00:28)  HR: 70 (29 Aug 2019 14:29) (70 - 86)  BP: 140/80 (29 Aug 2019 14:29) (140/80 - 167/77)  BP(mean): --  RR: 17 (29 Aug 2019 14:29) (17 - 18)  SpO2: 97% (29 Aug 2019 14:29) (93% - 97%)      General appearance: No acute distress  Neurological Exam:  Mental Status: Awake, eyes closed but opens on command. Follows simple commands.  Mild dysarthria but minimal verbal output.   Cranial Nerves: PERRL, EOMI.  Motor:   Tone: normal.                  Strength:   does not cooperate with full motor testing but moves all extremities; upper extremities 4+/5 bilaterally; lower extremities withdraw equally to pain but does not cooperate otherwise  Tremor: resting tremor (chronic)  Sensation: intact to light touch throughout, no extinction  Plantar reflexes downgoing     Levetiracetam Level, Serum: 27.4 mcg/mL [12.0 - 46.0] (08-27-19 @ 16:54)                          9.7    4.10  )-----------( 167      ( 29 Aug 2019 08:16 )             30.2     08-29    138  |  103  |  29<H>  ----------------------------<  85  4.0   |  20<L>  |  1.23    Ca    10.5      29 Aug 2019 06:17  Phos  2.9     08-29  Mg     2.2     08-29        Radiology    < from: CT Head No Cont (08.27.19 @ 03:07) >  IMPRESSION:  No gross interval change compared with the prior study in   somewhat mixed attenuation extra-axial collection with some component of   loculation. Subtle mass effect on the left lateral ventricle with subtle   shift of the midline to the right.    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    < end of copied text >
Brittany Harris MD  Attending Physician (Hospitalist)  pager: [phone]    Patient is a 86y old  Male who presents with a chief complaint of unresponsiveness (02 Sep 2019 09:53)        SUBJECTIVE / OVERNIGHT EVENTS:  Awake but confused. minimally verbal. no acute issues overnight   afebrile. denies any complaints.     MEDICATIONS  (STANDING):  amLODIPine   Tablet 10 milliGRAM(s) Oral daily  atorvastatin 40 milliGRAM(s) Oral at bedtime  cholecalciferol 1000 Unit(s) Oral daily  fludroCORTISONE 0.1 milliGRAM(s) Oral daily  lacosamide 50 milliGRAM(s) Oral two times a day  metoprolol succinate ER 25 milliGRAM(s) Oral daily    MEDICATIONS  (PRN):      Vital Signs Last 24 Hrs  T(C): 36.7 (03 Sep 2019 11:54), Max: 36.9 (02 Sep 2019 21:20)  T(F): 98.1 (03 Sep 2019 11:54), Max: 98.5 (02 Sep 2019 21:20)  HR: 60 (03 Sep 2019 11:54) (60 - 61)  BP: 144/62 (03 Sep 2019 11:54) (143/70 - 145/74)  BP(mean): --  RR: 18 (03 Sep 2019 11:54) (17 - 18)  SpO2: 100% (03 Sep 2019 11:54) (92% - 100%)  CAPILLARY BLOOD GLUCOSE        I&O's Summary    02 Sep 2019 07:01  -  03 Sep 2019 07:00  --------------------------------------------------------  IN: 420 mL / OUT: 0 mL / NET: 420 mL    03 Sep 2019 07:01  -  03 Sep 2019 13:28  --------------------------------------------------------  IN: 240 mL / OUT: 0 mL / NET: 240 mL          PHYSICAL EXAM  GENERAL: NAD, well-developed  HEAD:  Atraumatic, Normocephalic  EYES: EOMI, conjunctiva and sclera clear  NECK: Supple, No JVD  CHEST/LUNG: Clear to auscultation bilaterally; No wheeze  HEART: Regular rate and rhythm; No murmurs, rubs, or gallops  ABDOMEN: Soft, Nontender, Nondistended; Bowel sounds present  EXTREMITIES:  2+ Peripheral Pulses, No clubbing, cyanosis, or edema  PSYCH: AAOx1  SKIN: No rashes or lesions  Neuro: unable to assess due to MS. not following commands.     LABS:                        9.2    4.62  )-----------( 174      ( 03 Sep 2019 09:18 )             28.7     09-03    143  |  106  |  39<H>  ----------------------------<  99  3.8   |  26  |  1.51<H>    Ca    10.4      03 Sep 2019 05:52  Phos  3.2     09-03  Mg     2.3     09-03                  RADIOLOGY & ADDITIONAL TESTS:    Imaging Personally Reviewed:  Consultant(s) Notes Reviewed:    Care Discussed with Consultants/Other Providers:
Mohsin Khan, MD  Attending Physician, Division Of Hospital Medicine  Pager: [phone], Office: [phone]      Patient is a 86y old  Male who presents with a chief complaint of unresponsiveness     SUBJECTIVE / OVERNIGHT EVENTS:  Resting in bed, no acute distress, afebrile, no SOB, mental status unchanges, AAo x1  -174, Tele- SR 60-70s      MEDICATIONS  (STANDING):  amLODIPine   Tablet 10 milliGRAM(s) Oral daily  atorvastatin 40 milliGRAM(s) Oral at bedtime  cholecalciferol 1000 Unit(s) Oral daily  fludroCORTISONE 0.1 milliGRAM(s) Oral daily  lacosamide 50 milliGRAM(s) Oral two times a day    MEDICATIONS  (PRN):      Vital Signs Last 24 Hrs  T(C): 36.6 (31 Aug 2019 11:50), Max: 36.9 (30 Aug 2019 20:45)  T(F): 97.8 (31 Aug 2019 11:50), Max: 98.4 (30 Aug 2019 20:45)  HR: 60 (31 Aug 2019 11:50) (60 - 65)  BP: 168/71 (31 Aug 2019 11:50) (168/71 - 186/77)  BP(mean): --  RR: 18 (31 Aug 2019 11:50) (18 - 18)  SpO2: 98% (31 Aug 2019 11:50) (97% - 98%)  CAPILLARY BLOOD GLUCOSE        I&O's Summary    30 Aug 2019 07:01  -  31 Aug 2019 07:00  --------------------------------------------------------  IN: 60 mL / OUT: 0 mL / NET: 60 mL    31 Aug 2019 07:01  -  31 Aug 2019 16:13  --------------------------------------------------------  IN: 120 mL / OUT: 0 mL / NET: 120 mL        PHYSICAL EXAM:-  GENERAL: NAD, well-developed  EYES: EOMI, PERRLA, conjunctiva and sclera clear  NECK: Supple, No JVD, no thyromegaly  CHEST/LUNG: Clear to auscultation bilaterally; No wheeze  HEART: Regular rate and rhythm; S1, S2 audible, No murmurs, rubs, or gallops  ABDOMEN: Soft, Nontender, Nondistended; Bowel sounds present  EXTREMITIES:  2+ Peripheral Pulses, No clubbing, cyanosis, or edema  NEURO: AAOx1, no focal deficit      LABS:                        10.0   4.04  )-----------( 144      ( 30 Aug 2019 18:22 )             29.2     08-30    140  |  102  |  32<H>  ----------------------------<  138<H>  3.3<L>   |  25  |  1.24    Ca    10.2      30 Aug 2019 14:52      RADIOLOGY & ADDITIONAL TESTS:    Imaging Personally Reviewed: CT brain, CXR  Consultant(s) Notes Reviewed:  Neuro, NeuroSx  Care Discussed with Consultants/Other Providers:  Neuro, NeuroSx

## 2019-09-05 NOTE — PROGRESS NOTE ADULT - PROVIDER SPECIALTY LIST ADULT
Hospitalist
Internal Medicine
Internal Medicine
Neurosurgery
Hospitalist
Internal Medicine
Neurology
Internal Medicine
Hospitalist

## 2019-09-05 NOTE — DISCHARGE NOTE NURSING/CASE MANAGEMENT/SOCIAL WORK - PATIENT PORTAL LINK FT
You can access the FollowMyHealth Patient Portal offered by [facility] by registering at the following website: [email or web address]. By joining [email or web address]’s FollowMyHealth portal, you will also be able to view your health information using other applications (apps) compatible with our system.

## 2019-09-05 NOTE — PROGRESS NOTE ADULT - NSHPATTENDINGPLANDISCUSS_GEN_ALL_CORE
pt's family, epilepsy fellow, primary team
ROBIN-derek
Rachael
Rachael
Neurology, NeuroSx
neurology,
NP and patient family
NP
NP and wife.

## 2019-09-05 NOTE — PROGRESS NOTE ADULT - PROBLEM SELECTOR PROBLEM 4
Syncope and collapse
CKD (chronic kidney disease), stage III
CKD (chronic kidney disease), stage III
Orthostatic hypotension
CKD (chronic kidney disease), stage III
Orthostatic hypotension
Orthostatic hypotension
CKD (chronic kidney disease), stage III
Orthostatic hypotension
CKD (chronic kidney disease), stage III

## 2019-09-05 NOTE — PROGRESS NOTE ADULT - REASON FOR ADMISSION
unresponsiveness
SDH
unresponsiveness

## 2019-09-05 NOTE — PROGRESS NOTE ADULT - PROBLEM SELECTOR PROBLEM 5
Functional quadriplegia
CKD (chronic kidney disease), stage III
DESIRAE (acute kidney injury)
Functional quadriplegia
Functional quadriplegia
CKD (chronic kidney disease), stage III
CKD (chronic kidney disease), stage III
Functional quadriplegia
DESIRAE (acute kidney injury)
Functional quadriplegia

## 2019-09-05 NOTE — PROGRESS NOTE ADULT - PROBLEM SELECTOR PROBLEM 2
Orthostatic hypotension
PAT (paroxysmal atrial tachycardia)
PAT (paroxysmal atrial tachycardia)
Subdural hematoma
PAT (paroxysmal atrial tachycardia)
Subdural hematoma
Subdural hematoma
PAT (paroxysmal atrial tachycardia)
Subdural hematoma
PAT (paroxysmal atrial tachycardia)

## 2019-09-05 NOTE — PROGRESS NOTE ADULT - PROBLEM SELECTOR PROBLEM 3
Acute encephalopathy
Acute encephalopathy
Syncope and collapse
Syncope and collapse
Acute encephalopathy
Acute encephalopathy
Syncope and collapse
Acute encephalopathy
Syncope and collapse
Syncope and collapse

## 2019-09-05 NOTE — PROGRESS NOTE ADULT - PROBLEM SELECTOR PROBLEM 1
Subdural hematoma
Subdural hematoma
Syncope and collapse
Subdural hematoma
Syncope and collapse
Syncope and collapse
Subdural hematoma
Syncope and collapse
Subdural hematoma
Subdural hematoma

## 2019-09-05 NOTE — PROGRESS NOTE ADULT - PROBLEM SELECTOR PLAN 6
Outpatient F/U
Cr 1.49, around baseline  - monitor Cr  - avoid nephrotoxins
Outpatient F/U
Need to discuss with family prior treatment and current status of disease process.
Outpatient F/U
Outpatient F/U
patient bedbound dependent on all ADL's
patient bedbound dependent on all ADL's  - PT marlene
patient bedbound dependent on all ADL's  - PT marlene
Cr 1.49, around baseline  - monitor Cr  - avoid nephrotoxins

## 2019-09-05 NOTE — PROGRESS NOTE ADULT - PROBLEM SELECTOR PROBLEM 6
Prostate cancer
CKD (chronic kidney disease), stage III
Prostate cancer
Functional quadriplegia
Prostate cancer
CKD (chronic kidney disease), stage III

## 2019-09-05 NOTE — PROGRESS NOTE ADULT - ATTENDING COMMENTS
d/c planning with home hospice   d/c time 40 mins
Spoke to Leticia ( Dtr) the current status and plan
Spoke to Leticia ( dtr) yesterday about the plan
d/c planning with home hospice once equipment in place.
Spoke to Dtr, and sister at bedside the plan of care yesterday
Had verbal discussion with wife who is HCP Branden Wallace who states she would want patient to be DNR/DNI.  Will place order in sunrise and work on completing MOLST form

## 2019-09-05 NOTE — PROGRESS NOTE ADULT - PROBLEM SELECTOR PROBLEM 7
Advanced dementia
Advanced dementia
Functional quadriplegia
Advanced dementia
Prostate cancer
Prostate cancer
Functional quadriplegia

## 2019-09-05 NOTE — PROGRESS NOTE ADULT - PROBLEM SELECTOR PLAN 3
AAOx1, unchanged mental status,   - No s/s of infection, dehydration  - c/w treatment as above
AAOx1-2 at baseline, currently AAOx0 (oriented to self), but less responsive. Per daughter, patient is also speaking gibberish, which is not baseline;  Low suspicion for infection given afebrile and no leukocytosis. More likely related to recent fall  - B12/Folic acid WNL  - UA negative  - fall precautions  - aspiration precautions  - Keep NPO as failed bedside dysphagia official S/S evaluationo pending   - appreciate neurology consult   -  Remove level 1 restraints
Presented following episode of LOC lasting 30 mins per attendant.    Possibly related to orthostatic hypotension & SDH (although may be more chronic as per Neuro Svc.) SO far w/u otherwise neg.  - c/w VIMPAT, Florinef, statin
Presented following episode of LOC lasting 30 mins per attendant.  Possibly related to orthostatic hypotension & SDH. Reviewed Echo, CT brain, Neurology, NeuroSx eval  - c/w VIMPAT, Florinef, statin
AAOx1-1/2 improved a bit,   - No s/s of infection, dehydration  - c/w treatment as above
AAOx1-2 at baseline, currently AAOx1 (oriented to self), but less responsive. Per daughter, patient is also speaking gibberish, which is not baseline;  Low suspicion for infection given afebrile and no leukocytosis. More likely related to recent fall vs postictal state vs SDH-related vs. ischemic CVA  - check B12/Folic acid  - check UA  - fall precautions  - aspiration precautions  - Keep NPO as failed bedside dysphagia official S/S evaluation in   - neurology consult
Presented following episode of LOC lasting 30 mins per attendant.    Possibly related to orthostatic hypotension & SDH (although may be more chronic as per Neuro Svc.) SO far w/u otherwise neg.  - c/w VIMPAT, Florinef, statin
AAOx1-2 at baseline, currently AAOx0 (oriented to self), but less responsive. Per daughter, patient is also speaking gibberish, which is not baseline;  Low suspicion for infection given afebrile and no leukocytosis. More likely related to recent fall  - B12/Folic acid WNL  - UA negative  - fall precautions  - aspiration precautions  - Keep NPO as failed bedside dysphagia official S/S evaluation in   - appreciate neurology consult   - Will remove one to one.
Presented following episode of LOC lasting 30 mins per attendant.  Possibly related to orthostatic hypotension & SDH. Reviewed Echo, CT brain, Neurology, NeuroSx eval  - c/w VIMPAT, Florinef, statin
Presented following episode of LOC lasting 30 mins per attendant.    Possibly related to orthostatic hypotension & SDH (although may be more chronic as per Neuro Svc.) SO far w/u otherwise neg.  - c/w VIMPAT, Florinef, statin

## 2019-09-05 NOTE — GOALS OF CARE CONVERSATION - PERSONAL ADVANCE DIRECTIVE - CONVERSATION DETAILS
[facility] RN Note    D/C home today with hospice services. Daughter confirmed delivery of DME, Hospital bed and prn O2.      Ya Borges RN

## 2019-09-05 NOTE — PROGRESS NOTE ADULT - ASSESSMENT
87yo male w/h/o dementia (baseline A&O 1-2), syncope and falls due to orthostatic hypotension c/b SDH who presents for another episode of LOC. Pt is on LEV 1gm BID for sz ppx; per daughter, pt has never had any sz in the past. Got LEV 500mg in ER. Declining in mentation over the last yr. Epilepsy consulted to eval for possible sz.    Impression:  1. Syncope: Previous w/u for syncope concluded ortho hypotension. Low suspicion for sz. No IEDs on cvEEG.   2. Sz ppx: recommend low dose AED  3. Agitation: switch levetiracetam for lacosamide     Recommendation:  - taper off levetiracetam: 500/250mg 8/29 -> 250mg/DC 8/30  - start lacosamide: 100mg IV load -> 50mg BID as maintenance      Please contact Epilepsy with further question if needed.
85 y/o M with PMHx of dementia (AAOx1-2 at baseline), orthostatic hypotension (on fludrocortisone), seizures, prostate Ca, who presents as a transfer from [facility] for neurosurgical eval after SDH found on imaging, admitted for syncopal evaluation.
87 y/o M with PMHx of dementia (AAOx1-2 at baseline), orthostatic hypotension (on fludrocortisone), seizures, prostate Ca, who presents as a transfer from [facility] for neurosurgical eval after SDH found on imaging,
87 y/o M with PMHx of dementia (AAOx1-2 at baseline), orthostatic hypotension (on fludrocortisone), seizures, prostate Ca, who presents as a transfer from [facility] for neurosurgical eval after SDH found on imaging,
85 y/o M with PMHx of dementia (AAOx1-2 at baseline), orthostatic hypotension (on fludrocortisone), seizures, prostate Ca, who presents as a transfer from [facility] for neurosurgical eval after SDH found on imaging,
85 y/o M with PMHx of dementia (AAOx1-2 at baseline), orthostatic hypotension (on fludrocortisone), seizures, prostate Ca, who presents as a transfer from [facility] for neurosurgical eval after SDH found on imaging,
86M PMHx dementia, prostate Ca, hypotension, seizures, transferred from [place] with SDH. Patient was found down at home by home health aide. Not on AC, does take ASA 81, last was yesterday. CT head shows L parietal chronic SDH, ~1.2 cm at thickest part. Exam, AAOx0 (although did greet me with "hey doc"), follows some commands (squeeze fingers, move feet/toes) does not follow others (arms up for drift, smile). When following, strength is 5/5.  - CT is with negligible shift and SDH chronic in nature  - Hold ASA 81  - Admit to medicine for AMS workup  - Repeat CT head with significant change in mental status
87 y/o M with PMHx of dementia (AAOx1-2 at baseline), orthostatic hypotension (on fludrocortisone), seizures, prostate Ca, who presents as a transfer from [facility] for neurosurgical eval after SDH found on imaging, admitted for syncopal evaluation.
85 y/o M with PMHx of dementia (AAOx1-2 at baseline), orthostatic hypotension (on fludrocortisone), seizures, prostate Ca, who presents as a transfer from [facility] for neurosurgical eval after SDH found on imaging,
87 y/o M with PMHx of dementia (AAOx1-2 at baseline), orthostatic hypotension (on fludrocortisone), seizures, prostate Ca, who presents as a transfer from [facility] for neurosurgical eval after SDH found on imaging, admitted for syncopal evaluation.
87 y/o M with PMHx of dementia (AAOx1-2 at baseline), orthostatic hypotension (on fludrocortisone), seizures, prostate Ca, who presents as a transfer from [facility] for neurosurgical eval after SDH found on imaging,
85 y/o M with PMHx of dementia (AAOx1-2 at baseline), orthostatic hypotension (on fludrocortisone), seizures, prostate Ca, who presents as a transfer from [facility] for neurosurgical eval after SDH found on imaging,

## 2019-09-12 LAB — PTH RELATED PROT SERPL-MCNC: <2 PMOL/L — SIGNIFICANT CHANGE UP

## 2019-09-19 ENCOUNTER — APPOINTMENT (OUTPATIENT)
Dept: ELECTROPHYSIOLOGY | Facility: CLINIC | Age: 84
End: 2019-09-19
Payer: MEDICARE

## 2019-09-19 ENCOUNTER — APPOINTMENT (OUTPATIENT)
Dept: ELECTROPHYSIOLOGY | Facility: CLINIC | Age: 84
End: 2019-09-19

## 2019-09-19 PROCEDURE — 93296 REM INTERROG EVL PM/IDS: CPT

## 2019-09-19 PROCEDURE — 93294 REM INTERROG EVL PM/LDLS PM: CPT

## 2020-02-03 NOTE — ED ADULT NURSE NOTE - CHIEF COMPLAINT QUOTE
transfer from [facility]

## 2020-03-19 ENCOUNTER — APPOINTMENT (OUTPATIENT)
Dept: ELECTROPHYSIOLOGY | Facility: CLINIC | Age: 85
End: 2020-03-19

## 2020-07-27 NOTE — PATIENT PROFILE ADULT. - LIVES WITH, PROFILE
Sent by Doctor for blood transfusions, PST dept took labs on Thursday for pyonidial cyst, pt states 6.1, feels dizzy
spouse
Yes

## 2020-07-29 NOTE — DIETITIAN INITIAL EVALUATION ADULT. - OTHER INFO
[FreeTextEntry1] : EXAM: CT ABDOMEN AND PELVIS OC IC \par \par \par PROCEDURE DATE: 07/25/2020 \par \par \par INTERPRETATION: CLINICAL INFORMATION: Cough, worsening dyspnea on exertion, mild chest pain; found to have acute PE on outside institution CT angiogram. Evaluate for malignancy. \par \par COMPARISON: CT angiogram chest 7/24/2020. \par \par PROCEDURE: \par CT of the Abdomen and Pelvis was performed with intravenous contrast. \par Intravenous contrast: 90 ml Omnipaque 350. 10 ml discarded. \par Oral contrast: positive contrast was administered. \par Sagittal and coronal reformats were performed. \par \par FINDINGS: \par LOWER CHEST: Hypodensity within the lower lobe pulmonary arteries consistent with pulmonary emboli as seen on prior imaging. Trace dependent atelectasis. Few tiny calcified granulomata left posterior costophrenic angle. \par \par LIVER: Numerous subcentimeter bilobar hypodensities are too small to characterize. \par BILE DUCTS: Normal caliber. \par GALLBLADDER: Within normal limits. \par SPLEEN: Within normal limits. \par PANCREAS: Within normal limits. \par ADRENALS: Within normal limits. \par KIDNEYS/URETERS: Within normal limits. \par \par BLADDER: Within normal limits. \par REPRODUCTIVE ORGANS: Prostate within normal limits. \par \par BOWEL: No bowel obstruction. Appendix is not visualized versus shortened appendix.. \par PERITONEUM: No ascites. 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 \par RETROPERITONEUM/LYMPH NODES: No lymphadenopathy. \par ABDOMINAL WALL: Within normal limits. \par BONES: Degenerative changes. \par \par IMPRESSION: \par Redemonstrated bilateral pulmonary embolisms, better evaluated on prior CT angiogram chest from 7/24/2020. \par \par Numerous subcentimeter bilobar hypodensities within the liver too small to characterize. \par \par No definitive CT evidence of malignancy. \par \par \par FLORENCIO SHANNON M.D., RADIOLOGY RESIDENT \par This document has been electronically signed. \par ANDREWS DIAZ M.D., ATTENDING RADIOLOGIST \par This document has been electronically signed. Jul 26 2020 9:52AM \par \par  Limited subjective information obtained at this time. Pt with good appetite and po intakes in-house per HHA, observed 100% po intake of breakfast at time of visit. Per flowsheet 25-60% documented. No acute GI distress, +BM today. Per previous RD note, pt wt was documented as 199.9 pounds however it was 1 dosing wt recorded and pt was unable to confirm wt history at the time (12/11/2018), current wt is 171.5 pounds. Unable to assess reason for wt discrepancy at this time. Per chart, s/p MBS recommended for NPO (8/30). Noted per palliative care note, artificial nutrition is not recommended, remains on Dysphagia 1 honey consistency diet.

## 2020-08-15 NOTE — ED PROVIDER NOTE - NS ED MD TWO NIGHTS YN
You can access the FollowMyHealth Patient Portal offered by [facility] by registering at the following website: [email or web address]. By joining [employer]’s FollowMyHealth portal, you will also be able to view your health information using other applications (apps) compatible with our system.
Yes

## 2021-01-06 NOTE — ED PROVIDER NOTE - NS ED ATTENDING STATEMENT MOD
I have personally seen and examined this patient.  I have fully participated in the care of this patient. I have reviewed all pertinent clinical information, including history, physical exam, plan and the Resident’s note and agree except as noted. daily eye drops

## 2021-10-26 NOTE — SWALLOW BEDSIDE ASSESSMENT ADULT - SPECIFY REASON(S)
[Time Spent: ___ minutes] : I have spent [unfilled] minutes of time on the encounter.
to assess the swallow mechanism; r/o dysphagia.
to assess the swallow mechanism; r/o dysphagia.

## 2022-05-04 NOTE — PROGRESS NOTE ADULT - MINUTES
35
None
Partially impaired: cannot see medication labels or newsprint, but can see obstacles in path, and the surrounding layout; can count fingers at arm's length

## 2022-09-02 NOTE — ED PROVIDER NOTE - CONSULTING PHYSICIAN
Implemented All Universal Safety Interventions:  [place] to call system. Call bell, personal items and telephone within reach. Instruct patient to call for assistance. Room bathroom lighting operational. Non-slip footwear when patient is off stretcher. Physically safe environment: no spills, clutter or unnecessary equipment. Stretcher in lowest position, wheels locked, appropriate side rails in place. EP

## 2023-07-13 NOTE — PROGRESS NOTE ADULT - SUBJECTIVE AND OBJECTIVE BOX
Seun Miles MD DARRYL  Internal Medicine PGY-1  Pager [facility]: [phone]; [facility] [id number]    Patient is a 85y old  Male who presents with a chief complaint of DSH, syncopal workup (13 Dec 2018 07:16)      SUBJECTIVE/OVERNIGHT EVENTS   No acute events overnight. Says that he feels "bad" but has no active complaints. ROS otherwise negative.     OBJECTIVE  Vital Signs Last 24 Hrs  T(C): 36.4 (13 Dec 2018 03:33), Max: 36.8 (12 Dec 2018 21:27)  T(F): 97.6 (13 Dec 2018 03:33), Max: 98.2 (12 Dec 2018 21:27)  HR: 60 (13 Dec 2018 03:33) (60 - 65)  BP: 171/72 (13 Dec 2018 03:33) (158/74 - 175/74)  BP(mean): --  RR: 18 (13 Dec 2018 03:33) (18 - 18)  SpO2: 96% (13 Dec 2018 03:33) (94% - 100%)    I&O's Summary    12 Dec 2018 07:01  -  13 Dec 2018 07:00  --------------------------------------------------------  IN: 2070 mL / OUT: 2555 mL / NET: -485 mL    13 Dec 2018 07:01  -  13 Dec 2018 08:45  --------------------------------------------------------  IN: 0 mL / OUT: 50 mL / NET: -50 mL        PHYSICAL EXAM:  GENERAL: NAD  HEAD:  Atraumatic, Normocephalic  EYES: EOMI, conjunctiva and sclera clear  NECK: Supple, No JVD  CHEST/LUNG: Clear to auscultation bilaterally; No wheeze  HEART: Regular rate and rhythm; No murmurs, rubs, or gallops  ABDOMEN: Soft, Nontender, Nondistended; Bowel sounds present  EXTREMITIES:  2+ Peripheral Pulses, No clubbing, cyanosis, or edema  PSYCH: AAOx2  NEUROLOGY: LUE 3/5 muscle strength, increased tone of the RUE  SKIN: No rashes or lesions    LABS  CAPILLARY BLOOD GLUCOSE  POCT Blood Glucose.: 96 mg/dL (12 Dec 2018 18:41)    MEDICATIONS  (STANDING):  amLODIPine   Tablet 10 milliGRAM(s) Oral daily  atorvastatin 10 milliGRAM(s) Oral at bedtime  dexamethasone     Tablet 4 milliGRAM(s) Oral two times a day  enzalutamide 160 milliGRAM(s) Oral daily  fludroCORTISONE 0.1 milliGRAM(s) Oral daily  hydrALAZINE 25 milliGRAM(s) Oral every 12 hours  levETIRAcetam 1000 milliGRAM(s) Oral two times a day  tamsulosin 0.4 milliGRAM(s) Oral at bedtime    MEDICATIONS  (PRN): Samples Given: Aklief\\nOnexton for spot treatment Detail Level: Zone Initiate Treatment: Minocycline 100mg bid x 2 months Discontinue Regimen: Dapsone gel qam\\nWinlevi bid Render In Strict Bullet Format?: No

## 2023-07-16 NOTE — CHART NOTE - NSCHARTNOTEFT_GEN_A_CORE
ELECTROPHYSIOLOGY  Device Interrogation Performed                                  Date/Time: 7/26/2017  15:20  :     [employer] dual chamber PPM                      Model:          INsignia Ultra                         Mode:          DDD                   Rate:     60    Atrial Lead:  P wave amplitude:                          mv          Impedence:                   Ohms      Threshold:                V@             ms      Ventricular Lead(s):  RV Lead: R wave amplitude:                          mv          Impedence:                   Ohms      Threshold:                V@             ms   LV Lead:  R wave amplitude:                          mv          Impedence:                   Ohms      Threshold:                V@             ms     Battery Status:                                   LINDA         {X}   since 6/17/2017            Underlying Rhythm:    Sinus dora  50's bpm    Events/Observation: No high ventricular rate events;  No high atrial rate events     Impression/Plan: p/w syncope; not pacer dependent; full EP consult to follow   No reprogramming. Yes

## 2023-10-25 NOTE — PROGRESS NOTE ADULT - PROBLEM/PLAN-3
DISPLAY PLAN FREE TEXT Erythromycin Pregnancy And Lactation Text: This medication is Pregnancy Category B and is considered safe during pregnancy. It is also excreted in breast milk.

## 2024-03-22 NOTE — CONSULT NOTE ADULT - PROBLEM SELECTOR RECOMMENDATION 4
- Patient with advanced dementia, along with CKD, SDH, FTT.  - Hospice is appropriate.  - Referral made.   - Daughter Leticia not at bedside.  - Message left.   - If daughter is interested in discussing patients care with Palliative Care team, please set up meeting for Tuesday. none

## 2024-10-26 NOTE — DISCHARGE NOTE PROVIDER - CARE PROVIDER_API CALL
1.78
Lupe Monroy  PMMAEGAN  Phone: (   )    -  Fax: (   )    -  Follow Up Time: Routine    Hospice care network MD,   Phone: (   )    -  Fax: (   )    -  Follow Up Time: Routine

## 2024-11-20 NOTE — ED PROVIDER NOTE - TEMPLATE, MLM
[FreeTextEntry1] :  I, Neda Bailey, acted solely as a scribe for Dr. Rush Booth MD on this date 11/20/2024    All medical record entries made by the Scribe were at my, Dr. Rush Booth MD., direction and personally dictated by me on 11/20/2024 . I have reviewed the chart and agree that the record accurately reflects my personal performance of the history, physical exam, assessment and plan. I have also personally directed, reviewed, and agreed with the chart. General

## 2025-02-03 NOTE — DISCHARGE NOTE PROVIDER - NSDCHOSPICE_GEN_A_CORE
Fasting Guidelines    NPO Instructions:    Do not eat any food after midnight the night before your surgery/procedure.  You may have up to 13.5 ounces of clear liquids until TWO hours before your instructed arrival time to the hospital. This includes water, black tea/coffee, (no milk or cream), apple juice, and/or electrolyte drinks (Gatorade).  You may chew gum up to TWO hours before your surgery/procedure.    Additional Instructions:     We have sent a prescription for Hibiclens soap and Peridex mouth wash to your preferred pharmacy.  If you have not already, Please  your prescription and start using as directed before surgery.  Follow the instruction sheet provided to you at your CPM/PAT appointment.    Avoid herbal supplements, multivitamins and NSAIDS (non-steroidal anti-inflammatory drugs) such as Advil, Aleve, Ibuprofen, Naproxen, Excedrin, Meloxicam or Celebrex for at least 7 days prior to surgery. May take Tylenol as needed.    Avoid tobacco and alcohol products for 24 hours prior to surgery.    CONTACT SURGEON'S OFFICE IF YOU DEVELOP:  * Fever = 100.4 F   * New respiratory symptoms (e.g. cough, shortness of breath, respiratory distress, sore throat)  * Recent loss of taste or smell  *Flu like symptoms such as headache, fatigue or gastrointestinal symptoms  * You develop any open sores, shingles, burning or painful urination   AND/OR:  * You no longer wish to have the surgery.  * Any other personal circumstances change that may lead to the need to cancel or defer this surgery.  *You were admitted to any hospital within one week of your planned procedure.    Seven/Six Days before Surgery:  Review your medication instructions, stop indicated medications    Day of Surgery:  Review your medication instructions, take indicated medications  Wear comfortable loose fitting clothing  Do not use moisturizers, creams, lotions or perfume  All jewelry and valuables should be left at home    Elvin Puga  [facility]  Icdlr-[phone]  Kvm-[phone]  [email or web address]      Patient Information: Pre-Operative Infection Prevention Measures     Why did I have my nose, under my arms, and groin swabbed?  The purpose of the swab is to identify Staphylococcus aureus inside your nose or on your skin.  The swab was sent to the laboratory for culture.  A positive swab/culture for Staphylococcus aureus is called colonization or carriage.      What is Staphylococcus aureus?  Staphylococcus aureus, also known as “staph”, is a germ found on the skin or in the nose of healthy people.  Sometimes Staphylococcus aureus can get into the body and cause an infection.  This can be minor (such as pimples, boils, or other skin problems).  It might also be serious (such as a blood infection, pneumonia, or a surgical site infection).    What is Staphylococcus aureus colonization or carriage?  Colonization or carriage means that a person has the germ but is not sick from it.  These bacteria can be spread on the hands or when breathing or sneezing.    How is Staphylococcus aureus spread?  It is most often spread by close contact with a person or item that carries it.    What happens if my culture is positive for Staphylococcus aureus?  Your doctor/medical team will use this information to guide any antibiotic treatment which may be necessary.  Regardless of the culture results, we will clean the inside of your nose with a betadine swab just before you have your surgery.      Will I get an infection if I have Staphylococcus aureus in my nose or on my skin?  Anyone can get an infection with Staphylococcus aureus.  However, the best way to reduce your risk of infection is to follow the instructions provided to you for the use of your CHG soap and dental rinse.        Patient Information: Oral/Dental Rinse    What is oral/dental rinse?   It is a mouthwash. It is a way of cleaning the mouth with a  chest pain germ-killing solution before your surgery.  The solution contains chlorhexidine, commonly known as CHG.   It is used inside the mouth to kill a bacteria known as Staphylococcus aureus.  Let your doctor know if you are allergic to Chlorhexidine.    Why do I need to use CHG oral/dental rinse?  The CHG oral/dental rinse helps to kill a bacteria in your mouth known as Staphylococcus aureus.     This reduces the risk of infection at the surgical site.      Using your CHG oral/dental rinse  STEPS:  Use your CHG oral/dental rinse after you brush your teeth the night before (at bedtime) and the morning of your surgery.  Follow all directions on your prescription label.    Use the cap on the container to measure 15ml   Swish (gargle if you can) the mouthwash in your mouth for at least 30 seconds, (do not swallow) and spit out  After you use your CHG rinse, do not rinse your mouth with water, drink or eat.  Please refer to the prescription label for the appropriate time to resume oral intake      What side effects might I have using the CHG oral/dental rinse?  CHG rinse will stick to plaque on the teeth.  Brush and floss just before use.  Teeth brushing will help avoid staining of plaque during use.      Patient Information: Home Preoperative Antibacterial Shower      What is a home preoperative antibacterial shower?  This shower is a way of cleaning the skin with a germ-killing solution before surgery.  The solution contains chlorhexidine, commonly known as CHG.  CHG is a skin cleanser with germ-killing ability.  Let your doctor know if you are allergic to chlorhexidine.    Why do I need to take a preoperative antibacterial shower?  Skin is not sterile.  It is best to try to make your skin as free of germs as possible before surgery.  Proper cleansing with a germ-killing soap before surgery can lower the number of germs on your skin.  This helps to reduce the risk of infection at the surgical site.  Following the  instructions listed below will help you prepare your skin for surgery.      How do I use the solution?  Steps:  Begin using your CHG soap 5 days before your scheduled surgery on ________________________.    First, wash and rinse your hair using the CHG soap. Keep CHG soap away from ear canals and eyes.  Rinse completely, do not condition.  Hair extensions should be removed.  Wash your face with your normal soap and rinse.    Apply the CHG solution to a clean wet washcloth.  Turn the water off or move away from the water spray to avoid premature rinsing of the CHG soap as you are applying.   Firmly lather your entire body from the neck down.  Do not use on your face.  Pay special attention to the area(s) where your incision(s) will be located unless they are on your face.  Avoid scrubbing your skin too hard.  The important point is to have the CHG soap sit on your skin for 3 minutes.    When the 3 minutes are up, turn on the water and rinse the CHG solution off your body completely.   DO NOT wash with regular soap after you have used the CHG soap solution  Pat yourself dry with a clean, freshly-laundered towel.  DO NOT apply powders, deodorants, or lotions.  Dress in clean, freshly laundered nightclothes.    Be sure to sleep with clean, freshly laundered sheets.  Be aware that CHG will cause stains on fabrics; if you wash them with bleach after use.  Rinse your washcloth and other linens that have contact with CHG completely.  Use only non-chlorine detergents to launder the items used.   The morning of surgery is the fifth day.  Repeat the above steps and dress in clean comfortable clothing     Whom should I contact if I have any questions regarding the use of CHG soap?  Call the [facility] at [phone] if you have any questions.               Preoperative Brain Exercises    What are brain exercises?  A brain exercise is any activity that  engages your thinking (cognitive) skills.    What types of activities are considered brain exercises?  Jigsaw puzzles, crossword puzzles, word jumble, memory games, word search, and many more.  Many can be found free online or on your phone via a mobile [place].    Why should I do brain exercises before my surgery?  More recent research has shown brain exercise before surgery can lower the risk of postoperative delirium (confusion) which can be especially important for older adults.  Patients who did brain exercises for 5 to 10 hours the days before surgery, cut their risk of postoperative delirium in half up to 1 week after surgery.         [facility]    Preoperative Deep Breathing Exercises    Why it is important to do deep breathing exercises before my surgery?  Deep breathing exercises strengthen your breathing muscles.  This helps you to recover after your surgery and decreases the chance of breathing complications.      How are the deep breathing exercises done?  Sit straight with your back supported.  Breathe in deeply and slowly through your nose. Your lower rib cage should expand and your abdomen may move forward.  Hold that breath for 3 to 5 seconds.  Breathe out through pursed lips, slowly and completely.  Rest and repeat 10 times every hour while awake.  Rest longer if you become dizzy or lightheaded.         Patient and Family Education             Ways You Can Help Prevent Blood Clots             This handout explains some simple things you can do to help prevent blood clots.      Blood clots are blockages that can form in the body's veins. When a blood clot forms in your deep veins, it may be called a deep vein thrombosis, or DVT for short. Blood clots can happen in any part of the body where blood flows, but they are most common in the arms and legs. If a piece of a blood clot breaks free and travels to the lungs, it is called a pulmonary embolus (PE). A PE can be a very  serious problem.         Being in the hospital or having surgery can raise your chances of getting a blood clot because you may not be well enough to move around as much as you normally do.         Ways you can help prevent blood clots in the hospital         Wearing SCDs. SCDs stands for Sequential Compression Devices.   SCDs are special sleeves that wrap around your legs  They attach to a pump that fills them with air to gently squeeze your legs every few minutes.   This helps return the blood in your legs to your heart.   SCDs should only be taken off when walking or bathing.   SCDs may not be comfortable, but they can help save your life.               Wearing compression stockings - if your doctor orders them. These special snug fitting stockings gently squeeze your legs to help blood flow.       Walking. Walking helps move the blood in your legs.   If your doctor says it is ok, try walking the halls at least   5 times a day. Ask us to help you get up, so you don't fall.      Taking any blood thinning medicines your doctor orders.        Page 1 of 2     [facility]; 3/23   Ways you can help prevent blood clots at home       Wearing compression stockings - if your doctor orders them. ? Walking - to help move the blood in your legs.       Taking any blood thinning medicines your doctor orders.      Signs of a blood clot or PE      Tell your doctor or nurse know right away if you have of the problems listed below.    If you are at home, seek medical care right away. Call 911 for chest pain or problems breathing.               Signs of a blood clot (DVT) - such as pain,  swelling, redness or warmth in your arm or leg      Signs of a pulmonary embolism (PE) - such as chest     pain or feeling short of breath                                         Yes No
